# Patient Record
Sex: FEMALE | Race: WHITE | NOT HISPANIC OR LATINO | Employment: FULL TIME | URBAN - METROPOLITAN AREA
[De-identification: names, ages, dates, MRNs, and addresses within clinical notes are randomized per-mention and may not be internally consistent; named-entity substitution may affect disease eponyms.]

---

## 2018-03-09 ENCOUNTER — OFFICE VISIT (OUTPATIENT)
Dept: INTERNAL MEDICINE | Facility: CLINIC | Age: 23
End: 2018-03-09
Payer: COMMERCIAL

## 2018-03-09 VITALS
WEIGHT: 212.06 LBS | HEART RATE: 67 BPM | RESPIRATION RATE: 18 BRPM | SYSTOLIC BLOOD PRESSURE: 116 MMHG | DIASTOLIC BLOOD PRESSURE: 92 MMHG | BODY MASS INDEX: 33.28 KG/M2 | HEIGHT: 67 IN

## 2018-03-09 DIAGNOSIS — Z76.89 ENCOUNTER TO ESTABLISH CARE WITH NEW DOCTOR: Primary | ICD-10-CM

## 2018-03-09 DIAGNOSIS — B36.0 TINEA VERSICOLOR: ICD-10-CM

## 2018-03-09 DIAGNOSIS — Z86.79 H/O SINUS TACHYCARDIA: ICD-10-CM

## 2018-03-09 PROCEDURE — 99203 OFFICE O/P NEW LOW 30 MIN: CPT | Mod: S$GLB,,, | Performed by: INTERNAL MEDICINE

## 2018-03-09 PROCEDURE — 99999 PR PBB SHADOW E&M-NEW PATIENT-LVL III: CPT | Mod: PBBFAC,,, | Performed by: INTERNAL MEDICINE

## 2018-03-09 RX ORDER — KETOCONAZOLE 20 MG/G
CREAM TOPICAL 2 TIMES DAILY
Qty: 60 G | Refills: 1 | Status: SHIPPED | OUTPATIENT
Start: 2018-03-09 | End: 2018-06-25

## 2018-03-09 RX ORDER — PROPRANOLOL HYDROCHLORIDE 80 MG/1
80 CAPSULE, EXTENDED RELEASE ORAL DAILY
Refills: 10 | COMMUNITY
Start: 2018-02-19 | End: 2019-02-07 | Stop reason: SDUPTHER

## 2018-03-09 NOTE — PROGRESS NOTES
"Subjective:       Patient ID: Elizabeth Piña is a 22 y.o. female.    Chief Complaint: Rash (to neck)      HPI:  Patient is new to clinic and presents to establish care. Rash located on left neck. First noticed 3 months ago. Sometimes pruritic. Tried hydrocortisone without relief. Rash is not spreading. No new soaps, detergents. Uses Dial and Tone body wash and uses Tide detergent. Has never had any rash like this before. No contacts with similar rsh.     No prior h/o HTN, DM, HLD. She has intermittent tachycardia for which she takes propranolol. She has "heart murmur". Sees Dr. Mensah.     Tobacco use; never smoker  EtOH: weekend use  Illicit drugs: denies    Past Medical History:   Diagnosis Date    Migraines     Murmur     Tachycardia        Family History   Problem Relation Age of Onset    Diabetes Mother     Hypertension Father     Hyperlipidemia Father     Cerebral palsy Brother      half brother; dad's side    Heart disease Maternal Grandmother     Heart disease Maternal Grandfather     Heart disease Paternal Grandmother     Heart disease Paternal Grandfather        Social History     Social History    Marital status: Legally      Spouse name: N/A    Number of children: N/A    Years of education: N/A     Occupational History    Not on file.     Social History Main Topics    Smoking status: Never Smoker    Smokeless tobacco: Never Used    Alcohol use Yes      Comment: occ    Drug use: No    Sexual activity: Yes     Partners: Male     Birth control/ protection: IUD     Other Topics Concern    Not on file     Social History Narrative    No narrative on file       Review of Systems   Constitutional: Negative for activity change, fatigue, fever and unexpected weight change.   HENT: Negative for congestion, ear pain, hearing loss, rhinorrhea, sore throat and tinnitus.    Eyes: Negative for pain, redness and visual disturbance.   Respiratory: Negative for cough, shortness of breath and " wheezing.    Cardiovascular: Negative for chest pain, palpitations and leg swelling.   Gastrointestinal: Negative for abdominal pain, blood in stool, constipation, diarrhea, nausea and vomiting.   Genitourinary: Negative for dysuria, frequency, pelvic pain and urgency.   Musculoskeletal: Negative for back pain, joint swelling and neck pain.   Skin: Positive for rash. Negative for color change and wound.   Neurological: Negative for dizziness, tremors, weakness, light-headedness and headaches.         Objective:      Physical Exam   Constitutional: She is oriented to person, place, and time. She appears well-developed and well-nourished. No distress.   HENT:   Head: Normocephalic and atraumatic.   Right Ear: External ear normal.   Left Ear: External ear normal.   Eyes: EOM are normal. Right eye exhibits no discharge. Left eye exhibits no discharge. No scleral icterus.   Neck: Neck supple. No thyromegaly present.   Cardiovascular: Normal rate and regular rhythm.  Exam reveals no gallop and no friction rub.    No murmur heard.  Pulmonary/Chest: Effort normal and breath sounds normal. No respiratory distress. She has no wheezes. She has no rales.   Abdominal: Soft. Bowel sounds are normal. She exhibits no distension. There is no tenderness.   Lymphadenopathy:     She has no cervical adenopathy.   Neurological: She is alert and oriented to person, place, and time. No cranial nerve deficit.   Skin: Skin is warm and dry. Rash (reddish brown scaling macules on left neck, left acilla and under left breast) noted.   Psychiatric: She has a normal mood and affect. Her behavior is normal.   Vitals reviewed.      Assessment:       1. Encounter to establish care with new doctor    2. Tinea versicolor    3. H/O sinus tachycardia        Plan:       Elizabeth  was seen today for rash.    Diagnoses and all orders for this visit:    Encounter to establish care with new doctor  meds reconciled  Problem list reviewed and updated    Tinea  versicolor  -     ketoconazole (NIZORAL) 2 % cream; Apply topically 2 (two) times daily.  If not better let me know, will send to dermatology    H/O sinus tachycardia  Cont propranolol  Sees Dr. Mensah  HR normal today and regular  asymptomatic        Health Maintenance:  -CRC: not due  -PAP/MMG: seeing Dr. Aguillon April  -Bone Density: not due yet  -tobacco: never smoker  -Vaccines  Flu- completed    RTC RTC PRN

## 2018-03-09 NOTE — PATIENT INSTRUCTIONS
Tinea Versicolor  This is a rash caused by a fungus in the top layers of the skin. This fungus is normally present in the pores of the skin and causes no symptoms. But when the fungus overgrows it causes a rash. The fungus grows more easily in hot climates, and on oily or sweaty skin. Health experts dont know why some people get this rash and others dont. Experts also dont know why the rash will suddenly appear in someone who has never had it before.  The rash is made up of irregular pale or tan spots and patches. The rash is usually on the neck, upper back, chest, and shoulders. You may have mild itching, especially if you become overheated. But it doesn't cause other symptoms. Because these spots don't change color with sun exposure like normal skin, the rash may be lighter or darker than your normal skin.  This rash is harmless and usually causes no symptoms. The only reason for treatment is to improve appearance. Follow the advice below to clear the rash. It might take several months for normal skin color to return.  Home care  · Use a medicated dandruff shampoo over your whole body while in the shower. Dont use soap. Let the shampoo stay on for at least a few minutes before rinsing off. Do this every day for 4 weeks.  · As a different treatment, you may buy an antifungal cream (miconazole or clotrimazole, both available without a prescription). Use this 2 times a day for 7 days.   · This rash is not contagious to others. It cant be spread if someone touches it. So you dont have to worry about exposing others at school, , or work.  Prevention  This fungus can come back again (recur) after treatment. To prevent return of the rash, use medicated dandruff shampoo over your whole body when in the shower. Do this once a month for the next year. This is very important to do in the summertime. That is when the rash is most likely to recur.  Other prevention tips include:  · Avoid oily skin  products  · Wear loose clothing. Try to let your skin stay cool and breathe.  · Use sunscreen and protect yourself from sunlight  · Avoid tanning beds  Follow-up care  Follow up with your healthcare provider, or as advised. Call your provider if the rash doesnt get better with the above treatment, or if new symptoms appear.  When to seek medical advice  Call your healthcare provider right away if any of these occur:  · Increasing redness of the rash  · Change in appearance of the rash  · Fever of 100.4ºF (38ºC) or higher, or as directed by your provider  Date Last Reviewed: 8/1/2016  © 8196-2958 [employer]. [address] [place] [address], [place] [address]. All rights reserved. This information is not intended as a substitute for professional medical care. Always follow your healthcare professional's instructions.

## 2018-03-20 ENCOUNTER — OFFICE VISIT (OUTPATIENT)
Dept: INTERNAL MEDICINE | Facility: CLINIC | Age: 23
End: 2018-03-20
Payer: COMMERCIAL

## 2018-03-20 VITALS
BODY MASS INDEX: 34.12 KG/M2 | SYSTOLIC BLOOD PRESSURE: 104 MMHG | OXYGEN SATURATION: 99 % | WEIGHT: 217.38 LBS | TEMPERATURE: 98 F | HEIGHT: 67 IN | DIASTOLIC BLOOD PRESSURE: 62 MMHG | HEART RATE: 72 BPM | RESPIRATION RATE: 18 BRPM

## 2018-03-20 DIAGNOSIS — J06.9 VIRAL URI WITH COUGH: Primary | ICD-10-CM

## 2018-03-20 PROCEDURE — 99213 OFFICE O/P EST LOW 20 MIN: CPT | Mod: 25,S$GLB,, | Performed by: INTERNAL MEDICINE

## 2018-03-20 PROCEDURE — 99999 PR PBB SHADOW E&M-EST. PATIENT-LVL IV: CPT | Mod: PBBFAC,,, | Performed by: INTERNAL MEDICINE

## 2018-03-20 PROCEDURE — 96372 THER/PROPH/DIAG INJ SC/IM: CPT | Mod: S$GLB,,, | Performed by: INTERNAL MEDICINE

## 2018-03-20 RX ORDER — METHYLPREDNISOLONE ACETATE 80 MG/ML
80 INJECTION, SUSPENSION INTRA-ARTICULAR; INTRALESIONAL; INTRAMUSCULAR; SOFT TISSUE
Status: COMPLETED | OUTPATIENT
Start: 2018-03-20 | End: 2018-03-20

## 2018-03-20 RX ORDER — PROMETHAZINE HYDROCHLORIDE AND CODEINE PHOSPHATE 6.25; 1 MG/5ML; MG/5ML
5 SOLUTION ORAL EVERY 6 HOURS PRN
Qty: 120 ML | Refills: 0 | Status: SHIPPED | OUTPATIENT
Start: 2018-03-20 | End: 2018-03-30

## 2018-03-20 RX ADMIN — METHYLPREDNISOLONE ACETATE 80 MG: 80 INJECTION, SUSPENSION INTRA-ARTICULAR; INTRALESIONAL; INTRAMUSCULAR; SOFT TISSUE at 10:03

## 2018-03-20 NOTE — PATIENT INSTRUCTIONS
Viral Upper Respiratory Illness (Adult)  You have a viral upper respiratory illness (URI), which is another term for the common cold. This illness is contagious during the first few days. It is spread through the air by coughing and sneezing. It may also be spread by direct contact (touching the sick person and then touching your own eyes, nose, or mouth). Frequent handwashing will decrease risk of spread. Most viral illnesses go away within 7 to 10 days with rest and simple home remedies. Sometimes the illness may last for several weeks. Antibiotics will not kill a virus, and they are generally not prescribed for this condition.    Home care  · If symptoms are severe, rest at home for the first 2 to 3 days. When you resume activity, don't let yourself get too tired.  · Avoid being exposed to cigarette smoke (yours or others).  · You may use acetaminophen or ibuprofen to control pain and fever, unless another medicine was prescribed. (Note: If you have chronic liver or kidney disease, have ever had a stomach ulcer or gastrointestinal bleeding, or are taking blood-thinning medicines, talk with your healthcare provider before using these medicines.) Aspirin should never be given to anyone under 18 years of age who is ill with a viral infection or fever. It may cause severe liver or brain damage.  · Your appetite may be poor, so a light diet is fine. Avoid dehydration by drinking 6 to 8 glasses of fluids per day (water, soft drinks, juices, tea, or soup). Extra fluids will help loosen secretions in the nose and lungs.  · Over-the-counter cold medicines will not shorten the length of time youre sick, but they may be helpful for the following symptoms: cough, sore throat, and nasal and sinus congestion. (Note: Do not use decongestants if you have high blood pressure.)  Follow-up care  Follow up with your healthcare provider, or as advised.  When to seek medical advice  Call your healthcare provider right away if any  of these occur:  · Cough with lots of colored sputum (mucus)  · Severe headache; face, neck, or ear pain  · Difficulty swallowing due to throat pain  · Fever of 100.4°F (38°C)  Call 911, or get immediate medical care  Call emergency services right away if any of these occur:  · Chest pain, shortness of breath, wheezing, or difficulty breathing  · Coughing up blood  · Inability to swallow due to throat pain  Date Last Reviewed: 9/13/2015  © 6226-7998 [employer]. [address] [place] [address]. All rights reserved. This information is not intended as a substitute for professional medical care. Always follow your healthcare professional's instructions.

## 2018-03-20 NOTE — PROGRESS NOTES
Subjective:       Patient ID: Elizabeth Piña is a 22 y.o. female.    Chief Complaint: URI; Fever; and Otalgia      HPI:  Patient is known seb e and presents with sore throat, cough and congestion. Sx started 5-6 days ago. Getting worse. Cough is productive. No SOB or wheezing. She noted temp of 99 F but nothing higher. + b/l otalgia. Sore throat is improving. + PND. + sneezing. Tried tylenol cold OTC without relief.    Past Medical History:   Diagnosis Date    Elevated cholesterol     Heart disorder     hole in heart    Migraine headache     Migraines     Murmur     Tachycardia        Family History   Problem Relation Age of Onset    Diabetes Mother     Hypertension Father     Hyperlipidemia Father     Cerebral palsy Brother      half brother; dad's side    Heart disease Maternal Grandmother     Heart disease Maternal Grandfather     Heart disease Paternal Grandmother     Heart disease Paternal Grandfather     Hyperlipidemia Mother     Diabetes Maternal Grandmother     Kidney disease Maternal Grandfather        Social History     Social History    Marital status: Legally      Spouse name: N/A    Number of children: N/A    Years of education: N/A     Occupational History    Not on file.     Social History Main Topics    Smoking status: Never Smoker    Smokeless tobacco: Never Used    Alcohol use Yes      Comment: occ    Drug use: No    Sexual activity: Yes     Partners: Male     Birth control/ protection: IUD     Other Topics Concern    Not on file     Social History Narrative    ** Merged History Encounter **            Review of Systems   Constitutional: Positive for fatigue. Negative for activity change, fever and unexpected weight change.   HENT: Positive for congestion, postnasal drip, sneezing and sore throat. Negative for ear pain, hearing loss and rhinorrhea.    Eyes: Negative for redness and visual disturbance.   Respiratory: Positive for cough. Negative for shortness of  breath and wheezing.    Cardiovascular: Negative for chest pain and leg swelling.   Gastrointestinal: Negative for abdominal pain, constipation, diarrhea, nausea and vomiting.   Genitourinary: Negative for dysuria, frequency and urgency.   Musculoskeletal: Negative for back pain, joint swelling and neck pain.   Skin: Negative for color change, rash and wound.   Neurological: Negative for dizziness, tremors, weakness, light-headedness and headaches.         Objective:      Physical Exam   Constitutional: She is oriented to person, place, and time. She appears well-developed and well-nourished. No distress.   HENT:   Head: Normocephalic and atraumatic.   Right Ear: Tympanic membrane and external ear normal.   Left Ear: Tympanic membrane and external ear normal.   Mouth/Throat: Posterior oropharyngeal erythema (mild) present. No oropharyngeal exudate or posterior oropharyngeal edema.   Eyes: Conjunctivae and EOM are normal. Pupils are equal, round, and reactive to light. Right eye exhibits no discharge. Left eye exhibits no discharge.   Neck: Neck supple. No tracheal deviation present.   Cardiovascular: Normal rate and regular rhythm.    No murmur heard.  Pulmonary/Chest: Effort normal and breath sounds normal. No respiratory distress. She has no wheezes. She has no rales.   Abdominal: Soft. Bowel sounds are normal. She exhibits no distension. There is no tenderness.   Neurological: She is alert and oriented to person, place, and time. No cranial nerve deficit.   Skin: Skin is warm and dry.   Psychiatric: She has a normal mood and affect. Her behavior is normal.   Vitals reviewed.      Assessment:       1. Viral URI with cough        Plan:       Elizabeth was seen today for uri, fever and otalgia.    Diagnoses and all orders for this visit:    Viral URI with cough  -     methylPREDNISolone acetate injection 80 mg; Inject 1 mL (80 mg total) into the muscle one time.  -     promethazine-codeine 6.25-10 mg/5 ml (PHENERGAN  WITH CODEINE) 6.25-10 mg/5 mL syrup; Take 5 mLs by mouth every 6 (six) hours as needed.    claritin daily  Saline nasal spray PRN  Call for worsening sx or fever > 101 F    RTC PRN

## 2018-03-20 NOTE — LETTER
March 20, 2018      [facility]  [address] [place] [id number]  Phone: [phone]  Fax: [phone]       Patient: Elizabeth Piña   YOB: 1995  Date of Visit: 03/20/2018    To Whom It May Concern:    Lona Piña  was at [facility] on 03/20/2018. She may return to work/school on 3/21/18 with no restrictions. If you have any questions or concerns, or if I can be of further assistance, please do not hesitate to contact me.    Sincerely,    Bisi Gottlieb MD

## 2018-04-17 ENCOUNTER — PATIENT MESSAGE (OUTPATIENT)
Dept: ADMINISTRATIVE | Facility: OTHER | Age: 23
End: 2018-04-17

## 2018-04-20 ENCOUNTER — OFFICE VISIT (OUTPATIENT)
Dept: OBSTETRICS AND GYNECOLOGY | Facility: CLINIC | Age: 23
End: 2018-04-20
Payer: COMMERCIAL

## 2018-04-20 VITALS
HEART RATE: 50 BPM | DIASTOLIC BLOOD PRESSURE: 82 MMHG | BODY MASS INDEX: 33.12 KG/M2 | WEIGHT: 211 LBS | HEIGHT: 67 IN | SYSTOLIC BLOOD PRESSURE: 124 MMHG | RESPIRATION RATE: 16 BRPM

## 2018-04-20 DIAGNOSIS — Z30.09 UNWANTED FERTILITY: ICD-10-CM

## 2018-04-20 DIAGNOSIS — N94.6 DYSMENORRHEA: ICD-10-CM

## 2018-04-20 DIAGNOSIS — R10.2 PELVIC PAIN IN FEMALE: ICD-10-CM

## 2018-04-20 DIAGNOSIS — N92.1 MENORRHAGIA WITH IRREGULAR CYCLE: Primary | ICD-10-CM

## 2018-04-20 DIAGNOSIS — Z01.818 PREOPERATIVE TESTING: ICD-10-CM

## 2018-04-20 PROCEDURE — 99999 PR PBB SHADOW E&M-EST. PATIENT-LVL III: CPT | Mod: PBBFAC,,, | Performed by: OBSTETRICS & GYNECOLOGY

## 2018-04-20 PROCEDURE — 99204 OFFICE O/P NEW MOD 45 MIN: CPT | Mod: S$GLB,,, | Performed by: OBSTETRICS & GYNECOLOGY

## 2018-04-20 NOTE — Clinical Note
An IUD has been ordered per Dr. Aguillon through [facility] on 4/25/18.  She will have the insertion at time of surgery on 6/1/18.  Please f/u on this to make sure it is received in time.    Thanks,  Rita

## 2018-04-20 NOTE — PROGRESS NOTES
Subjective:   Patient ID: Elizabeth Piña is a 22 y.o. y.o. female.     Chief Complaint:   Chief Complaint   Patient presents with    Pelvic Pain    Pelvic Discomfort          History of Present Illness:    Elizabeth presents today complaining of menorrhagia and pelvic pain. Complaining of pain and bleeding.  Reports she has always had heavy menses except for when she had Mirena (~ 3 years ago).  Menses are at least 7 days.  Menses are irregular.  She has had a cycle every 2 weeks for ~ 2 months.  Bleeding heavy with increased passage of clots.  No other contraceptive has helped bleeding other than the Mirena IUD.  States she has pelvic and rectal pain.  Feels like someone is sticking a knife in her rectum.  Has pain in between menstrual cycles and pain with intercourse.  Has cramping in her abdomen as well.  Has pain with bowel movements.  Has pain almost daily and pain is significantly worsened with menses that is worse with the heaviest of bleeding. Has tried naproxen Rx and ibuprofen neither of which has helped significantly.  Pain radiates into her legs.  Had pelvic ultrasound that revealed probable adenomyosis and follicles on ovaries in 1/17 with her prior gynecologist (Dr. LUC Hyde).  Has mild hirsutism.  Patient's last menstrual period was 04/19/2018 (exact date).  States she does not desire future childbearing.     Past Medical History:   Diagnosis Date    Elevated cholesterol     Heart disorder     hole in heart    Migraine headache     Migraines     Murmur     Tachycardia      History reviewed. No pertinent surgical history.  Review of patient's allergies indicates:   Allergen Reactions    Doxycycline Other (See Comments)     Yeast infection     Current Outpatient Prescriptions on File Prior to Visit   Medication Sig Dispense Refill    ketoconazole (NIZORAL) 2 % cream Apply topically 2 (two) times daily. 60 g 1    propranolol (INDERAL LA) 80 MG 24 hr capsule Take 80 mg by mouth once daily.  10      No current facility-administered medications on file prior to visit.      Social History     Social History    Marital status: Legally      Spouse name: N/A    Number of children: N/A    Years of education: N/A     Occupational History    Not on file.     Social History Main Topics    Smoking status: Never Smoker    Smokeless tobacco: Never Used    Alcohol use Yes      Comment: occ    Drug use: No    Sexual activity: Yes     Partners: Male     Birth control/ protection: IUD      Comment: paraguard     Other Topics Concern    Not on file     Social History Narrative    ** Merged History Encounter **          Family History   Problem Relation Age of Onset    Diabetes Mother     Hyperlipidemia Mother     Hypertension Father     Hyperlipidemia Father     Cerebral palsy Brother      half brother; dad's side    Heart disease Maternal Grandmother     Diabetes Maternal Grandmother     Heart disease Maternal Grandfather     Kidney disease Maternal Grandfather     Heart disease Paternal Grandmother     Heart disease Paternal Grandfather     Breast cancer Neg Hx     Colon cancer Neg Hx     Ovarian cancer Neg Hx      The following portions of the patient's history were reviewed and updated as appropriate: allergies, current medications, past family history, past medical history, past social history, past surgical history and problem list.    Review of Systems   Constitutional: Negative for activity change.   Genitourinary: Positive for menorrhagia, menstrual problem and pelvic pain.   All other systems reviewed and are negative.        Objective:   Vital Signs:  Vitals:    04/20/18 1606   BP: 124/82   Pulse: (!) 50   Resp: 16       Physical Exam:  General:  alert; oriented; well-nourished female   Cardiovascular:  Pulmonary:  Abdomen: Regular rate and rhythm  Clear to auscultation bilaterallt   soft, mildly tender in bilateral lower abdomen (L>R); no rebound or guarding.   Bowel sounds  normal. No masses,  no organomegaly   Pelvis: External genitalia: normal general appearance  Urinary system: urethral meatus normal, bladder nontender  Vaginal: normal mucosa without prolapse or lesions; minimal dark blood in vaginal vault  Cervix: normal appearance; nontender  Uterus: retroverted; mildly tender  Adnexa: mildly tender in left adnexa; no palpable masses       Assessment:      1. Menorrhagia with irregular cycle    2. Dysmenorrhea    3. Pelvic pain in female    4. Unwanted fertility          Plan:      Menorrhagia with irregular cycle  -     Case Request Operating Room: LAPAROSCOPY-DIAGNOSTIC, DILATION-CURETTAGE OF UTERUS (D AND C), ABLATION ENDOMETRIAL THERMAL - NOVASURE, PLACEMENT INTRAUTERINE DEVICE (IUD)    Dysmenorrhea  -     Case Request Operating Room: LAPAROSCOPY-DIAGNOSTIC, DILATION-CURETTAGE OF UTERUS (D AND C), ABLATION ENDOMETRIAL THERMAL - NOVASURE, PLACEMENT INTRAUTERINE DEVICE (IUD)    Pelvic pain in female  -     Case Request Operating Room: LAPAROSCOPY-DIAGNOSTIC, DILATION-CURETTAGE OF UTERUS (D AND C), ABLATION ENDOMETRIAL THERMAL - NOVASURE, PLACEMENT INTRAUTERINE DEVICE (IUD)    Unwanted fertility  -     Case Request Operating Room: LAPAROSCOPY-DIAGNOSTIC, DILATION-CURETTAGE OF UTERUS (D AND C), ABLATION ENDOMETRIAL THERMAL - NOVASURE, PLACEMENT INTRAUTERINE DEVICE (IUD)    Counseled on further evaluation and management options.   Pt desires to proceed with diagnostic laparoscopy/D&C/Novasure ablation/Mirena IUD placement.  Counseled on risks, benefits, alternatives, and potential side effects.   Pt verbalized understanding.  Consents signed.  Will proceed with diagnostic laparoscopy/D&C/Novasure ablation/Mirena IUD placement pending acceptable preoperative testing.

## 2018-04-20 NOTE — Clinical Note
Per Eulalia in pharmacy, order IUD through specialty pharmacy for surgery.   Order pended.  Kindly,  Rita

## 2018-04-23 ENCOUNTER — PATIENT MESSAGE (OUTPATIENT)
Dept: NEUROLOGY | Facility: CLINIC | Age: 23
End: 2018-04-23

## 2018-04-23 ENCOUNTER — TELEPHONE (OUTPATIENT)
Dept: OBSTETRICS AND GYNECOLOGY | Facility: CLINIC | Age: 23
End: 2018-04-23

## 2018-04-23 NOTE — TELEPHONE ENCOUNTER
Surgery scheduled for 6/1/18 per pt's request  per case t number [id number].  Pt notified and verbalized understanding.

## 2018-04-23 NOTE — TELEPHONE ENCOUNTER
----- Message from Olivia Aguillon MD sent at 4/20/2018  4:50 PM CDT -----  Diagnostic laparoscopy/D&C/Novasure ablation/Mirena IUD placement

## 2018-05-04 ENCOUNTER — TELEPHONE (OUTPATIENT)
Dept: PHARMACY | Facility: CLINIC | Age: 23
End: 2018-05-04

## 2018-05-04 NOTE — TELEPHONE ENCOUNTER
Patient declined Mirena IUD consultation on 18.   Patient stated she had been on it previously.  Mirena shipment will be coordinated with [facility]- scheduled to deliver on 18 to [address], [place] [address]. $0 copay - patient informed. Confirmed 2 patient identifiers - name and . Patient verbalized understanding. Medlist and allergies reviewed.  All questions answered to patient's satisfaction.  Patient advised to call [facility] should any questions arise.

## 2018-05-04 NOTE — TELEPHONE ENCOUNTER
Mirena initial consultation and consent to coordinate shipment with [facility] attempted.  No answer.  M for call back.  $0 copay.

## 2018-05-07 ENCOUNTER — OFFICE VISIT (OUTPATIENT)
Dept: NEUROLOGY | Facility: CLINIC | Age: 23
End: 2018-05-07
Payer: COMMERCIAL

## 2018-05-07 VITALS
WEIGHT: 209.44 LBS | BODY MASS INDEX: 32.87 KG/M2 | HEART RATE: 62 BPM | DIASTOLIC BLOOD PRESSURE: 78 MMHG | RESPIRATION RATE: 16 BRPM | HEIGHT: 67 IN | SYSTOLIC BLOOD PRESSURE: 106 MMHG

## 2018-05-07 DIAGNOSIS — G43.019 INTRACTABLE MIGRAINE WITHOUT AURA AND WITHOUT STATUS MIGRAINOSUS: Primary | ICD-10-CM

## 2018-05-07 PROCEDURE — 3008F BODY MASS INDEX DOCD: CPT | Mod: CPTII,S$GLB,, | Performed by: PSYCHIATRY & NEUROLOGY

## 2018-05-07 PROCEDURE — 99203 OFFICE O/P NEW LOW 30 MIN: CPT | Mod: S$GLB,,, | Performed by: PSYCHIATRY & NEUROLOGY

## 2018-05-07 PROCEDURE — 99999 PR PBB SHADOW E&M-EST. PATIENT-LVL III: CPT | Mod: PBBFAC,,, | Performed by: PSYCHIATRY & NEUROLOGY

## 2018-05-07 RX ORDER — TOPIRAMATE 50 MG/1
50 TABLET, FILM COATED ORAL 2 TIMES DAILY
Qty: 60 TABLET | Refills: 11 | Status: SHIPPED | OUTPATIENT
Start: 2018-05-07 | End: 2019-02-07 | Stop reason: SDUPTHER

## 2018-05-07 RX ORDER — CETIRIZINE HYDROCHLORIDE 10 MG/1
10 TABLET ORAL DAILY
COMMUNITY
End: 2018-10-15

## 2018-05-07 RX ORDER — RIZATRIPTAN BENZOATE 5 MG/1
TABLET ORAL
Qty: 9 TABLET | Refills: 11 | Status: SHIPPED | OUTPATIENT
Start: 2018-05-07 | End: 2018-06-25

## 2018-05-07 NOTE — PROGRESS NOTES
"HPI:  : Elizabeth Piña is a 22 y.o. female with episode in early 2012 which sound like Vasovagal syncope. Near consulvive syncope at that time with "grunting and eyes rolling back" also noted, likely due to maintained upright position. Work up revealed only Small PFO noted on bubble study but cardiology agrees this is likely vasovagal in nature  No AED needed. History of migraine with aura.     It has been three years since the last visit with me.   Here again to talk about her headaches, which had improved but are recurring again more for frequently for the past 6 months. She is currently having 3 spells per week of headache which occurs in the frontal region. This is severe and disabling and lasts for hours with light and sound sensitivity. No recent aura or med chagnes.NO change in headache with IUD  She is pending surgery June 1 for endometriosis and she has 2 kids- will be pending  Ablation.   She was using exedrin without much relief. Now not using anything PRN. Maxalt did help but she is out.  Elavil made her too sleepy.  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   HENT: Negative for nosebleeds.    Eyes: Negative for double vision.   Respiratory: Negative for shortness of breath.    Cardiovascular: Negative for leg swelling.   Gastrointestinal: Negative for blood in stool.   Genitourinary: Negative for hematuria.   Musculoskeletal: Negative for neck pain.   Skin: Negative for rash.   Neurological: Positive for headaches. Negative for sensory change.   Psychiatric/Behavioral: The patient does not have insomnia.        Exam:   Gen Appearance, well developed/nourished in no apparent distress  CV: 2+ distal pulses with no edema or swelling  Neuro:  MS: Awake, alert, Sustains attention. Recent/remote memory intact, Language is full to spontaneous speech/comprehension. Fund of " "Knowledge is full  CN: Optic discs are flat with normal vasculature, PERRL, Extraoccular movements and visual fields are full. Normal facial sensation and strength, Tongue and Palate are midline and strong. Shoulder Shrug symmetric and strong.  Motor: Normal bulk, tone, no abnormal movements. No protonator drift and 5/5 bilateral UE/LE strength  Sensory: . Romberg negative and temp and vibration intact  Cerebellar: Finger-nose, Heal and shin intact  Gait: Normal stance, no ataxia      Imaging:3/28/12 MRI of the brain reports and images reviewed: NORMAL MRI OF THE BRAIN.  2/6/12 EEG report reviewed from [facility]: Normal    Assessment/Recommendation: Elizabeth Piña is a 22 y.o. female with episode in early 2012 which sound like Vasovagal syncope. Near consulvive syncope at that time with "grunting and eyes rolling back" also noted, likely due to maintained upright position. Work up revealed only Small PFO noted on bubble study but cardiology agrees this is likely vasovagal in nature  No AED needed. History of migraine with aura.  I recommend:     1. For migraine PRN:  Restart Maxalt PRN-- helps. Imitrex not effective  2. Propranolol is being used due to palpitations per Cardiologist/ no syncope/ PFO likely incidentally but is not fully helping her headaches right now   3.  Elavil caused too much sedation  4.  Try Topamax for headache prevention per orders Unless sedation, mood changes or other side effects  D/c all  meds above if pregnant. She is using IUD and pending ablation  Teratogenicity risk of Topamax reviewed  5. If the above fails, next step will be Botox. More imaging can be avoided as long as she improves as expected.     RTC 6months          "

## 2018-05-07 NOTE — PATIENT INSTRUCTIONS
Start Topamax as follows:    Take 1/2 tablet (25mg) nightly for 3 weeks.  Then 1/2 tablet twice daily for 3 weeks  Then 12/ tablet in the morning and 1 full tablet(50mg) at night  for 3 weeks.   Then take 1 tablet twice daily

## 2018-05-28 ENCOUNTER — ANESTHESIA EVENT (OUTPATIENT)
Dept: SURGERY | Facility: HOSPITAL | Age: 23
End: 2018-05-28
Payer: COMMERCIAL

## 2018-05-28 ENCOUNTER — HOSPITAL ENCOUNTER (OUTPATIENT)
Dept: PREADMISSION TESTING | Facility: HOSPITAL | Age: 23
Discharge: HOME | End: 2018-05-28
Attending: OBSTETRICS & GYNECOLOGY
Payer: COMMERCIAL

## 2018-05-28 ENCOUNTER — HOSPITAL ENCOUNTER (OUTPATIENT)
Dept: PULMONOLOGY | Facility: HOSPITAL | Age: 23
Discharge: HOME | End: 2018-05-28
Attending: OBSTETRICS & GYNECOLOGY
Payer: COMMERCIAL

## 2018-05-28 VITALS — HEIGHT: 67 IN | BODY MASS INDEX: 32.87 KG/M2 | WEIGHT: 209.44 LBS

## 2018-05-28 DIAGNOSIS — Z01.818 PREOPERATIVE TESTING: ICD-10-CM

## 2018-05-28 PROCEDURE — 93010 ELECTROCARDIOGRAM REPORT: CPT | Mod: ,,, | Performed by: INTERNAL MEDICINE

## 2018-05-28 PROCEDURE — 93005 ELECTROCARDIOGRAM TRACING: CPT

## 2018-05-28 NOTE — DISCHARGE INSTRUCTIONS
[facility]  Pre Admit Instructions    Day and Date of Procedure: Friday 6-1-18   Arrival time: 530           - Before 7 a.m. Enter through Emergency Room    · You must have a responsible  to bring you home    Do NOT eat or drink anything   past midnight before your procedure day    Please    · Do not wear makeup, jewelry, nail polish or body piercings  · Bring containers/solution for contacts, dentures, bridges - these and hearing aids will be removed before your procedure  · Do not bring cash, jewelry or valuables the day of your procedure   · No smoking at least 24 hours before your procedure  · Wear clothing that is comfortable and easy to take off and put on  · Do NOT shave for at least 5 days before your surgery    Review skin preparation handout before using. Shower with Hibiclens the Night before the procedure. Bring remaining Hibiclens with you the morning of surgery.                Information about your stay (Please Review)    Before Surgery  1. Cafeteria Meals: 7am to 10am; 11am to 1:30 pm; Dinner/Supper must may be ordered between 11:00 am and 4 pm from the [facility] [employer] Menu. Food will be available to  between 5 pm and 6 pm. The kitchen phone extension is [phone].  2. Your doctor may order and review labs, x-rays, ECG or other tests as a pre-surgery workup and will call you if there is need for follow up.  3. No smoking inside or outside the hospital on hospital grounds.  4. Wear clothing that is easy to take off and put on.  The hospital will provide you with a gown.  5. You may bring robe, slippers, nightwear, and toiletries (toothbrush, toothpaste, makeup).  6. If your doctor orders a Fleets Enema or other prep, follow package and/or doctors orders.  7. Brush your teeth and rinse your mouth the morning of surgery, but dont swallow the water.  8. The nurse will ask questions and check your condition.  The doctor may kameron your surgical site.  9. Compression boots may  be put on your calves to reduce the risk of blood clots.  10. The doctor may order medicine to help you relax before surgery.  After Surgery  1. The nurse will check your temperature, breathing, blood pressure, heart rate, IV site, and surgery site.  2. A diet will be ordered-most start with ice chips and then advance slowly to other foods.  3. If you have IV fluids the IV pump will beep to let the nurse know that she needs to check it.  4. You may have a urinary catheter and staff may measure your oral intake and urine output.  5. Pain medication may be ordered by the doctor after surgery.  If you have a pain management device tell your caretakers not to press the button because of OVERDOSE RISK.  6. When the nurse or doctor tells you it is okay to get out of bed, ask for help until you are stable.  7. The nurse may ask you to turn, cough, and deep breathe to prevent lung problems.  You can use a pillow to hold your incision when you deep breathe or cough to reduce pain.  8. The nurse will give you discharge instructions--incision care, symptoms to report to your doctor, and your follow-up appointment when you are discharged.  You cannot drive yourself home.  Goal for Discharge from One Day Surgery  · Control pain with an oral medication  · Walk without feeling dizzy or weak  · Tolerate liquids well  · Urinate without difficulty    Things you can do to  Reduce the Risk of Infections or Complications  Wash Hands and use Waterless Hand Sanitizers  · Wash hands frequently with soap and warm water for at least 15 seconds.   · Use hand sanitizers (alcohol based) often at home and in public if hands are not visibly soiled  Take Antibiotic Exactly as Prescribed  · Do not stop antibiotics too soon; you risk developing infection resistant to antibiotics  · Take your antibiotic even if you are feeling better and even if they upset your stomach  · Call the doctor if you cant tolerate the antibiotic or you have an allergic  reaction  Stay Healthy  · Take medicines as prescribed by your doctor  · Keep your diabetes under control - diet and medication  · Get enough rest, exercise and eat a healthy diet  Keep the Wound Clean and Dry  · Wash hands before and after taking care of the incision (cut)  · Wash hands when you remove a dressing, before you touch/apply a new dressing  · Shower and clean incision with antibacterial soap and rinse well if the doctor approves  · Allow the cut to dry completely before putting on a clean dressing  · Do not touch the part of the bandage that will cover the incision  · Do not use ointments unless your doctor tells you to-can promote bacterial growth  · If ordered, put ointment directly on the dressing-do not touch the end of the tube  · Do not scrub, remove scabs, or leave a damp dressing on the incision  · Do not use peroxide or alcohol to clean the incision unless the doctor tells you to   · Do not let children, pets or anyone else contaminate the incision  Stop Smoking To Prevent Infection  · Stop smoking-Centers for Disease Control recommends 30 days before surgery  · Smokers get more infections after surgery-studies have shown 6 times the risk  · Smokers have more scarring and heal slower-open wounds get infected easier  Prevent Respiratory complications  · Stop smoking  · Turn, cough, and deep breathe even if you have some pain when you do so.  · Splint your incision with a pillow when you cough/deep breath, to help control pain.  · Do not lie in one position for long periods of time.   Prevent Blood Clots  · When you wake move your legs, flex your feet, rotate your ankles, wiggle your toes  · Get up when the doctor says its ok.  Dangle your feet from the side of the bed  · Report symptoms-leg pain, redness/swelling, warm to touch; fever; shortness of breath, chest pain, severe upper back pain.

## 2018-05-28 NOTE — ANESTHESIA PREPROCEDURE EVALUATION
05/28/2018  Elizabeth Piña is a 22 y.o., female.    Anesthesia Evaluation    I have reviewed the Patient Summary Reports.    I have reviewed the Nursing Notes.   I have reviewed the Medications.     Review of Systems  Anesthesia Hx:  No problems with previous Anesthesia   Denies Personal Hx of Anesthesia complications.   Social:  Non-Smoker, No Alcohol Use    Hematology/Oncology:  Hematology Normal   Oncology Normal     EENT/Dental:EENT/Dental Normal   Cardiovascular:  Cardiovascular Normal Exercise tolerance: good  Patient has PFO   Pulmonary:  Pulmonary Normal    Renal/:  Renal/ Normal     Hepatic/GI:  Hepatic/GI Normal    Musculoskeletal:  Musculoskeletal Normal    Neurological:   Headaches    Endocrine:  Endocrine Normal    Dermatological:  Skin Normal    Psych:  Psychiatric Normal           Physical Exam  General:  Well nourished    Airway/Jaw/Neck:  Airway Findings: Mouth Opening: Normal Tongue: Normal  General Airway Assessment: Adult  Mallampati: II  TM Distance: Normal, at least 6 cm      Dental:  Dental Findings: In tact             Anesthesia Plan  Type of Anesthesia, risks & benefits discussed:  Anesthesia Type:  general  Patient's Preference:   Intra-op Monitoring Plan: standard ASA monitors  Intra-op Monitoring Plan Comments:   Post Op Pain Control Plan: multimodal analgesia  Post Op Pain Control Plan Comments:   Induction:   IV  Beta Blocker:  Patient is on a Beta-Blocker and has received one dose within the past 24 hours (No further documentation required).       Informed Consent: Patient understands risks and agrees with Anesthesia plan.  Questions answered. Anesthesia consent signed with patient.  ASA Score: 2     Day of Surgery Review of History & Physical: I have interviewed and examined the patient. I have reviewed the patient's H&P dated: 6/1/18. There are no significant changes.  H&P  update referred to the surgeon.         Ready For Surgery From Anesthesia Perspective.

## 2018-05-31 ENCOUNTER — PATIENT MESSAGE (OUTPATIENT)
Dept: SURGERY | Facility: HOSPITAL | Age: 23
End: 2018-05-31

## 2018-05-31 RX ORDER — TERCONAZOLE 8 MG/G
1 CREAM VAGINAL DAILY
Qty: 20 G | Refills: 1 | Status: SHIPPED | OUTPATIENT
Start: 2018-05-31 | End: 2018-06-25

## 2018-05-31 NOTE — TELEPHONE ENCOUNTER
Patient scheduled for Laparoscopy with Dr. Aguillon 6/1/18 stating she has a yeast infection. Please advise.

## 2018-06-01 ENCOUNTER — SURGERY (OUTPATIENT)
Age: 23
End: 2018-06-01

## 2018-06-01 ENCOUNTER — ANESTHESIA (OUTPATIENT)
Dept: SURGERY | Facility: HOSPITAL | Age: 23
End: 2018-06-01
Payer: COMMERCIAL

## 2018-06-01 ENCOUNTER — HOSPITAL ENCOUNTER (OUTPATIENT)
Facility: HOSPITAL | Age: 23
Discharge: HOME | End: 2018-06-01
Attending: OBSTETRICS & GYNECOLOGY | Admitting: OBSTETRICS & GYNECOLOGY
Payer: COMMERCIAL

## 2018-06-01 VITALS
OXYGEN SATURATION: 95 % | WEIGHT: 206.63 LBS | RESPIRATION RATE: 16 BRPM | TEMPERATURE: 97 F | HEIGHT: 67 IN | BODY MASS INDEX: 32.43 KG/M2 | SYSTOLIC BLOOD PRESSURE: 127 MMHG | DIASTOLIC BLOOD PRESSURE: 83 MMHG | HEART RATE: 71 BPM

## 2018-06-01 DIAGNOSIS — Z30.09 UNWANTED FERTILITY: ICD-10-CM

## 2018-06-01 DIAGNOSIS — N92.1 MENORRHAGIA WITH IRREGULAR CYCLE: Primary | ICD-10-CM

## 2018-06-01 DIAGNOSIS — N80.9 ENDOMETRIOSIS: ICD-10-CM

## 2018-06-01 DIAGNOSIS — N94.6 DYSMENORRHEA: ICD-10-CM

## 2018-06-01 DIAGNOSIS — R10.2 PELVIC PAIN IN FEMALE: ICD-10-CM

## 2018-06-01 DIAGNOSIS — Z30.433 ENCOUNTER FOR REMOVAL AND REINSERTION OF IUD: ICD-10-CM

## 2018-06-01 LAB — POCT GLUCOSE: 92 MG/DL (ref 70–110)

## 2018-06-01 PROCEDURE — 37000008 HC ANESTHESIA 1ST 15 MINUTES: Performed by: OBSTETRICS & GYNECOLOGY

## 2018-06-01 PROCEDURE — 58662 LAPAROSCOPY EXCISE LESIONS: CPT | Mod: ,,, | Performed by: OBSTETRICS & GYNECOLOGY

## 2018-06-01 PROCEDURE — 37000009 HC ANESTHESIA EA ADD 15 MINS: Performed by: OBSTETRICS & GYNECOLOGY

## 2018-06-01 PROCEDURE — 25000003 PHARM REV CODE 250: Performed by: OBSTETRICS & GYNECOLOGY

## 2018-06-01 PROCEDURE — 94799 UNLISTED PULMONARY SVC/PX: CPT

## 2018-06-01 PROCEDURE — 99900035 HC TECH TIME PER 15 MIN (STAT)

## 2018-06-01 PROCEDURE — 58301 REMOVE INTRAUTERINE DEVICE: CPT | Mod: 51,,, | Performed by: OBSTETRICS & GYNECOLOGY

## 2018-06-01 PROCEDURE — 88305 TISSUE EXAM BY PATHOLOGIST: CPT | Mod: 26,,, | Performed by: PATHOLOGY

## 2018-06-01 PROCEDURE — 63600175 PHARM REV CODE 636 W HCPCS: Performed by: NURSE ANESTHETIST, CERTIFIED REGISTERED

## 2018-06-01 PROCEDURE — 88305 TISSUE EXAM BY PATHOLOGIST: CPT | Performed by: PATHOLOGY

## 2018-06-01 PROCEDURE — 27201423 OPTIME MED/SURG SUP & DEVICES STERILE SUPPLY: Performed by: OBSTETRICS & GYNECOLOGY

## 2018-06-01 PROCEDURE — 36000709 HC OR TIME LEV III EA ADD 15 MIN: Performed by: OBSTETRICS & GYNECOLOGY

## 2018-06-01 PROCEDURE — 25000003 PHARM REV CODE 250: Performed by: NURSE ANESTHETIST, CERTIFIED REGISTERED

## 2018-06-01 PROCEDURE — 88300 SURGICAL PATH GROSS: CPT | Mod: 26,,, | Performed by: PATHOLOGY

## 2018-06-01 PROCEDURE — 36000708 HC OR TIME LEV III 1ST 15 MIN: Performed by: OBSTETRICS & GYNECOLOGY

## 2018-06-01 PROCEDURE — 58120 DILATION AND CURETTAGE: CPT | Mod: 59,,, | Performed by: OBSTETRICS & GYNECOLOGY

## 2018-06-01 PROCEDURE — 71000033 HC RECOVERY, INTIAL HOUR: Performed by: OBSTETRICS & GYNECOLOGY

## 2018-06-01 PROCEDURE — 00840 ANES IPER PX LOWER ABD NOS: CPT | Mod: QZ,P2 | Performed by: NURSE ANESTHETIST, CERTIFIED REGISTERED

## 2018-06-01 PROCEDURE — 63600175 PHARM REV CODE 636 W HCPCS: Performed by: OBSTETRICS & GYNECOLOGY

## 2018-06-01 PROCEDURE — 58300 INSERT INTRAUTERINE DEVICE: CPT | Mod: ,,, | Performed by: OBSTETRICS & GYNECOLOGY

## 2018-06-01 RX ORDER — HYDROCODONE BITARTRATE AND ACETAMINOPHEN 5; 325 MG/1; MG/1
1 TABLET ORAL EVERY 6 HOURS PRN
Qty: 12 TABLET | Refills: 0 | Status: SHIPPED | OUTPATIENT
Start: 2018-06-01 | End: 2018-06-25

## 2018-06-01 RX ORDER — DIPHENHYDRAMINE HCL 25 MG
25 CAPSULE ORAL EVERY 4 HOURS PRN
Status: DISCONTINUED | OUTPATIENT
Start: 2018-06-01 | End: 2018-06-01 | Stop reason: HOSPADM

## 2018-06-01 RX ORDER — KETOROLAC TROMETHAMINE 30 MG/ML
INJECTION, SOLUTION INTRAMUSCULAR; INTRAVENOUS
Status: DISCONTINUED | OUTPATIENT
Start: 2018-06-01 | End: 2018-06-01

## 2018-06-01 RX ORDER — FENTANYL CITRATE 50 UG/ML
INJECTION, SOLUTION INTRAMUSCULAR; INTRAVENOUS
Status: DISCONTINUED | OUTPATIENT
Start: 2018-06-01 | End: 2018-06-01

## 2018-06-01 RX ORDER — EPHEDRINE SULFATE 50 MG/ML
INJECTION, SOLUTION INTRAVENOUS
Status: DISCONTINUED | OUTPATIENT
Start: 2018-06-01 | End: 2018-06-01

## 2018-06-01 RX ORDER — ONDANSETRON HYDROCHLORIDE 2 MG/ML
INJECTION, SOLUTION INTRAMUSCULAR; INTRAVENOUS
Status: DISCONTINUED | OUTPATIENT
Start: 2018-06-01 | End: 2018-06-01

## 2018-06-01 RX ORDER — AMOXICILLIN 250 MG
1 CAPSULE ORAL 2 TIMES DAILY
Status: DISCONTINUED | OUTPATIENT
Start: 2018-06-01 | End: 2018-06-01 | Stop reason: HOSPADM

## 2018-06-01 RX ORDER — MIDAZOLAM HYDROCHLORIDE 1 MG/ML
INJECTION INTRAMUSCULAR; INTRAVENOUS
Status: DISCONTINUED | OUTPATIENT
Start: 2018-06-01 | End: 2018-06-01

## 2018-06-01 RX ORDER — ACETAMINOPHEN 10 MG/ML
INJECTION, SOLUTION INTRAVENOUS
Status: DISCONTINUED | OUTPATIENT
Start: 2018-06-01 | End: 2018-06-01

## 2018-06-01 RX ORDER — ONDANSETRON 2 MG/ML
4 INJECTION INTRAMUSCULAR; INTRAVENOUS EVERY 6 HOURS PRN
Status: DISCONTINUED | OUTPATIENT
Start: 2018-06-01 | End: 2018-06-01 | Stop reason: HOSPADM

## 2018-06-01 RX ORDER — ROCURONIUM BROMIDE 10 MG/ML
INJECTION, SOLUTION INTRAVENOUS
Status: DISCONTINUED | OUTPATIENT
Start: 2018-06-01 | End: 2018-06-01

## 2018-06-01 RX ORDER — HYDROMORPHONE HYDROCHLORIDE 2 MG/ML
1 INJECTION, SOLUTION INTRAMUSCULAR; INTRAVENOUS; SUBCUTANEOUS EVERY 4 HOURS PRN
Status: DISCONTINUED | OUTPATIENT
Start: 2018-06-01 | End: 2018-06-01 | Stop reason: HOSPADM

## 2018-06-01 RX ORDER — KETOROLAC TROMETHAMINE 30 MG/ML
30 INJECTION, SOLUTION INTRAMUSCULAR; INTRAVENOUS ONCE
Status: COMPLETED | OUTPATIENT
Start: 2018-06-01 | End: 2018-06-01

## 2018-06-01 RX ORDER — HYDROCODONE BITARTRATE AND ACETAMINOPHEN 5; 325 MG/1; MG/1
1 TABLET ORAL EVERY 4 HOURS PRN
Status: DISCONTINUED | OUTPATIENT
Start: 2018-06-01 | End: 2018-06-01 | Stop reason: HOSPADM

## 2018-06-01 RX ORDER — PHENYLEPHRINE HYDROCHLORIDE 10 MG/ML
INJECTION INTRAVENOUS
Status: DISCONTINUED | OUTPATIENT
Start: 2018-06-01 | End: 2018-06-01

## 2018-06-01 RX ORDER — HYDROCODONE BITARTRATE AND ACETAMINOPHEN 10; 325 MG/1; MG/1
1 TABLET ORAL EVERY 4 HOURS PRN
Status: DISCONTINUED | OUTPATIENT
Start: 2018-06-01 | End: 2018-06-01 | Stop reason: HOSPADM

## 2018-06-01 RX ORDER — SODIUM CHLORIDE, SODIUM LACTATE, POTASSIUM CHLORIDE, CALCIUM CHLORIDE 600; 310; 30; 20 MG/100ML; MG/100ML; MG/100ML; MG/100ML
INJECTION, SOLUTION INTRAVENOUS CONTINUOUS PRN
Status: DISCONTINUED | OUTPATIENT
Start: 2018-06-01 | End: 2018-06-01

## 2018-06-01 RX ORDER — SODIUM CHLORIDE, SODIUM LACTATE, POTASSIUM CHLORIDE, CALCIUM CHLORIDE 600; 310; 30; 20 MG/100ML; MG/100ML; MG/100ML; MG/100ML
INJECTION, SOLUTION INTRAVENOUS CONTINUOUS
Status: DISCONTINUED | OUTPATIENT
Start: 2018-06-01 | End: 2018-06-01 | Stop reason: HOSPADM

## 2018-06-01 RX ORDER — PROPOFOL 10 MG/ML
VIAL (ML) INTRAVENOUS
Status: DISCONTINUED | OUTPATIENT
Start: 2018-06-01 | End: 2018-06-01

## 2018-06-01 RX ORDER — DEXAMETHASONE SODIUM PHOSPHATE 4 MG/ML
INJECTION, SOLUTION INTRA-ARTICULAR; INTRALESIONAL; INTRAMUSCULAR; INTRAVENOUS; SOFT TISSUE
Status: DISCONTINUED | OUTPATIENT
Start: 2018-06-01 | End: 2018-06-01

## 2018-06-01 RX ORDER — IBUPROFEN 800 MG/1
800 TABLET ORAL EVERY 8 HOURS PRN
Qty: 30 TABLET | Refills: 0 | Status: SHIPPED | OUTPATIENT
Start: 2018-06-01 | End: 2018-06-25

## 2018-06-01 RX ORDER — LIDOCAINE HYDROCHLORIDE 20 MG/ML
INJECTION, SOLUTION EPIDURAL; INFILTRATION; INTRACAUDAL; PERINEURAL
Status: DISCONTINUED | OUTPATIENT
Start: 2018-06-01 | End: 2018-06-01

## 2018-06-01 RX ORDER — NEOSTIGMINE METHYLSULFATE 5 MG/5 ML
SYRINGE (ML) INTRAVENOUS
Status: DISCONTINUED | OUTPATIENT
Start: 2018-06-01 | End: 2018-06-01

## 2018-06-01 RX ORDER — GLYCOPYRROLATE 0.2 MG/ML
INJECTION INTRAMUSCULAR; INTRAVENOUS
Status: DISCONTINUED | OUTPATIENT
Start: 2018-06-01 | End: 2018-06-01

## 2018-06-01 RX ADMIN — PHENYLEPHRINE HYDROCHLORIDE 100 MCG: 10 INJECTION INTRAVENOUS at 07:06

## 2018-06-01 RX ADMIN — FENTANYL CITRATE 25 MCG: 50 INJECTION, SOLUTION INTRAMUSCULAR; INTRAVENOUS at 09:06

## 2018-06-01 RX ADMIN — GLYCOPYRROLATE 0.2 MG: 0.2 INJECTION INTRAMUSCULAR; INTRAVENOUS at 08:06

## 2018-06-01 RX ADMIN — DOCUSATE SODIUM AND SENNOSIDES 1 TABLET: 8.6; 5 TABLET, FILM COATED ORAL at 10:06

## 2018-06-01 RX ADMIN — SODIUM CHLORIDE, SODIUM LACTATE, POTASSIUM CHLORIDE, AND CALCIUM CHLORIDE: .6; .31; .03; .02 INJECTION, SOLUTION INTRAVENOUS at 10:06

## 2018-06-01 RX ADMIN — DEXAMETHASONE SODIUM PHOSPHATE 8 MG: 4 INJECTION, SOLUTION INTRAMUSCULAR; INTRAVENOUS at 07:06

## 2018-06-01 RX ADMIN — HYDROMORPHONE HYDROCHLORIDE 1 MG: 2 INJECTION INTRAMUSCULAR; INTRAVENOUS; SUBCUTANEOUS at 12:06

## 2018-06-01 RX ADMIN — EPHEDRINE SULFATE 5 MG: 50 INJECTION INTRAMUSCULAR; INTRAVENOUS; SUBCUTANEOUS at 08:06

## 2018-06-01 RX ADMIN — ROCURONIUM BROMIDE 10 MG: 10 INJECTION, SOLUTION INTRAVENOUS at 08:06

## 2018-06-01 RX ADMIN — PROPOFOL 180 MG: 10 INJECTION, EMULSION INTRAVENOUS at 07:06

## 2018-06-01 RX ADMIN — FENTANYL CITRATE 50 MCG: 50 INJECTION, SOLUTION INTRAMUSCULAR; INTRAVENOUS at 07:06

## 2018-06-01 RX ADMIN — MIDAZOLAM HYDROCHLORIDE 2 MG: 1 INJECTION, SOLUTION INTRAMUSCULAR; INTRAVENOUS at 07:06

## 2018-06-01 RX ADMIN — SODIUM CHLORIDE, SODIUM LACTATE, POTASSIUM CHLORIDE, AND CALCIUM CHLORIDE: .6; .31; .03; .02 INJECTION, SOLUTION INTRAVENOUS at 07:06

## 2018-06-01 RX ADMIN — SODIUM CHLORIDE, SODIUM LACTATE, POTASSIUM CHLORIDE, AND CALCIUM CHLORIDE: .6; .31; .03; .02 INJECTION, SOLUTION INTRAVENOUS at 08:06

## 2018-06-01 RX ADMIN — ACETAMINOPHEN 1000 MG: 10 INJECTION, SOLUTION INTRAVENOUS at 07:06

## 2018-06-01 RX ADMIN — PROPOFOL 50 MG: 10 INJECTION, EMULSION INTRAVENOUS at 08:06

## 2018-06-01 RX ADMIN — EPHEDRINE SULFATE 10 MG: 50 INJECTION INTRAMUSCULAR; INTRAVENOUS; SUBCUTANEOUS at 08:06

## 2018-06-01 RX ADMIN — KETOROLAC TROMETHAMINE 30 MG: 30 INJECTION, SOLUTION INTRAMUSCULAR at 02:06

## 2018-06-01 RX ADMIN — ONDANSETRON 4 MG: 2 INJECTION, SOLUTION INTRAMUSCULAR; INTRAVENOUS at 07:06

## 2018-06-01 RX ADMIN — KETOROLAC TROMETHAMINE 30 MG: 30 INJECTION, SOLUTION INTRAMUSCULAR; INTRAVENOUS at 08:06

## 2018-06-01 RX ADMIN — FENTANYL CITRATE 50 MCG: 50 INJECTION, SOLUTION INTRAMUSCULAR; INTRAVENOUS at 08:06

## 2018-06-01 RX ADMIN — FENTANYL CITRATE 100 MCG: 50 INJECTION, SOLUTION INTRAMUSCULAR; INTRAVENOUS at 07:06

## 2018-06-01 RX ADMIN — HYDROCODONE BITARTRATE AND ACETAMINOPHEN 1 TABLET: 10; 325 TABLET ORAL at 10:06

## 2018-06-01 RX ADMIN — GLYCOPYRROLATE 0.4 MG: 0.2 INJECTION INTRAMUSCULAR; INTRAVENOUS at 08:06

## 2018-06-01 RX ADMIN — LIDOCAINE HYDROCHLORIDE 60 MG: 20 INJECTION, SOLUTION EPIDURAL; INFILTRATION; INTRACAUDAL; PERINEURAL at 07:06

## 2018-06-01 RX ADMIN — ROCURONIUM BROMIDE 30 MG: 10 INJECTION, SOLUTION INTRAVENOUS at 07:06

## 2018-06-01 RX ADMIN — Medication 4 MG: at 08:06

## 2018-06-01 NOTE — TRANSFER OF CARE
"Anesthesia Transfer of Care Note    Patient: Elizabeth Piña    Procedure(s) Performed: Procedure(s) (LRB):  LAPAROSCOPY-DIAGNOSTIC (N/A)  DILATION-CURETTAGE OF UTERUS (D AND C) (N/A)  ABLATION ENDOMETRIAL THERMAL - NOVASURE (N/A)  PLACEMENT INTRAUTERINE DEVICE (IUD) (N/A)    Patient location: PACU    Anesthesia Type: general    Transport from OR: Transported from OR on 6-10 L/min O2 by face mask with adequate spontaneous ventilation    Post pain: adequate analgesia    Post assessment: no apparent anesthetic complications and tolerated procedure well    Post vital signs: stable    Level of consciousness: sedated    Nausea/Vomiting: no nausea/vomiting    Complications: none    Transfer of care protocol was followed      Last vitals:   Visit Vitals  /87 (BP Location: Right arm, Patient Position: Sitting)   Pulse 72   Temp 37 °C (98.6 °F) (Oral)   Resp 18   Ht 5' 7" (1.702 m)   Wt 93.7 kg (206 lb 9.6 oz)   LMP 05/15/2018   SpO2 97%   Breastfeeding? No   BMI 32.36 kg/m²     "

## 2018-06-01 NOTE — BRIEF OP NOTE
Brief Operative Note     SUMMARY     Surgery Date: 6/1/2018     Surgeon(s) and Role:     * Olivia Aguillon MD - Primary    Pre-op Diagnosis:  Dysmenorrhea [N94.6]  Pelvic pain in female [R10.2]  Menorrhagia with irregular cycle [N92.1]  Unwanted fertility [Z30.09]  Encounter for IUD removal and placement    Post-op Diagnosis:  same    Procedure(s) (LRB):  REMOVAL OF INTRAUTERINE DEVICE  LAPAROSCOPY-DIAGNOSTIC (N/A)  FULGURATION OF ENDOMETRIOSIS  LEFT OVARIAN CYSTECTOMY  LYSIS OF ADHESIONS  DILATION-CURETTAGE OF UTERUS (D AND C) (N/A)  ABLATION ENDOMETRIAL THERMAL - NOVASURE (N/A)  PLACEMENT INTRAUTERINE DEVICE (IUD) (N/A)    Anesthesia: General    Findings/Key Components:  IUD present.  Uterus sounded to 8.5 cm.  Cervical length 3.5 cm.  Uterine cavity length 5 cm.  Adhesion of omentum to upper abdominal wall to the left of the umbilicus.   Normal appearing uterus.  Normal appearing tubes bilaterally.  Normal appearing right ovary.  ~ 4 cm simple appearing left ovarian cyst. Diffuse endometriosis present in posterior cul-de-sac.  Endometriosis present on rectum.  Endometriosis present along left ureter.  Endometriosis present along bilateral uterosacral ligaments and along posterior aspect of the uterus and left pelvic sidewall.   Endometriosis most pronounced on the left.    Moderate amount of endometrial curettings.  Uterine cavity width 3.7 cm.  Ablation power 102 duarte.  Ablation time 1 min 20 seconds.    Estimated Blood Loss: 10 mL           Specimens     Start     Ordered    06/01/18 0914  Specimen to Pathology - Surgery  Once      06/01/18 0920    06/01/18 0823  Specimen to Pathology - Surgery  Once      06/01/18 0839          Discharge Note      SUMMARY     Admit Date: 6/1/2018    Attending Physician: Olivia Aguillon MD    Discharge Physician: Olivia Aguillon MD    Discharge Date: 6/1/2018     Admit Diagnosis:  Dysmenorrhea [N94.6]  Pelvic pain in female [R10.2]  Menorrhagia with irregular cycle [N92.1]  Unwanted  fertility [Z30.09]    Final Diagnosis: Same    Procedure:REMOVAL OF INTRAUTERINE DEVICE  LAPAROSCOPY-DIAGNOSTIC (N/A)  FULGURATION OF ENDOMETRIOSIS  LEFT OVARIAN CYSTECTOMY  LYSIS OF ADHESIONS  DILATION-CURETTAGE OF UTERUS (D AND C) (N/A)  ABLATION ENDOMETRIAL THERMAL - NOVASURE (N/A)  PLACEMENT INTRAUTERINE DEVICE (IUD) (N/A)Disposition: Home or Self Care    Condition: Stable    Hospital Course: Pt underwent REMOVAL OF INTRAUTERINE DEVICE  LAPAROSCOPY-DIAGNOSTIC (N/A)  FULGURATION OF ENDOMETRIOSIS  LEFT OVARIAN CYSTECTOMY  LYSIS OF ADHESIONS  DILATION-CURETTAGE OF UTERUS (D AND C) (N/A)  ABLATION ENDOMETRIAL THERMAL - NOVASURE (N/A)  PLACEMENT INTRAUTERINE DEVICE (IUD) (N/A) wiithout complications.  Her postoperative course was uncomplicated and she met all discharge criteria on postoperative day #0.    Patient Instructions:   Current Discharge Medication List      START taking these medications    Details   HYDROcodone-acetaminophen (NORCO) 5-325 mg per tablet Take 1 tablet by mouth every 6 (six) hours as needed for Pain.  Qty: 12 tablet, Refills: 0      ibuprofen (ADVIL,MOTRIN) 800 MG tablet Take 1 tablet (800 mg total) by mouth every 8 (eight) hours as needed (cramping).  Qty: 30 tablet, Refills: 0         CONTINUE these medications which have NOT CHANGED    Details   cetirizine (ZYRTEC) 10 MG tablet Take 10 mg by mouth once daily.      ketoconazole (NIZORAL) 2 % cream Apply topically 2 (two) times daily.  Qty: 60 g, Refills: 1    Associated Diagnoses: Tinea versicolor      levonorgestrel (MIRENA) 20 mcg/24 hr (5 years) IUD 1 intra uterine device by intrauterine route once  Qty: 1 Intra Uterine Device, Refills: 0      propranolol (INDERAL LA) 80 MG 24 hr capsule Take 80 mg by mouth once daily.  Refills: 10      topiramate (TOPAMAX) 50 MG tablet Take 1 tablet (50 mg total) by mouth 2 (two) times daily.  Qty: 60 tablet, Refills: 11      rizatriptan (MAXALT) 5 MG tablet Take one daily as needed for headache. May  repeat dose once after 2 hours if needed  Qty: 9 tablet, Refills: 11      terconazole (TERAZOL 3) 0.8 % vaginal cream Place 1 applicator vaginally once daily.  Qty: 20 g, Refills: 1         STOP taking these medications       propranolol (INNOPRAN XL) 80 mg 24 hr capsule Comments:   Reason for Stopping:               Discharge Procedure Orders (must include Diet, Follow-up, Activity)    Discharge Procedure Orders (must include Diet, Follow-up, Activity)  Diet general     Other restrictions (specify):   Order Comments: No lifting >20lbs, pelvic rest, no driving while on narcotics     Call MD for:  temperature >100.4     Call MD for:  persistent nausea and vomiting     Call MD for:  severe uncontrolled pain     Call MD for:  difficulty breathing, headache or visual disturbances     Call MD for:  redness, tenderness, or signs of infection (pain, swelling, redness, odor or green/yellow discharge around incision site)     Call MD for:  hives     Call MD for:  persistent dizziness or light-headedness     Call MD for:  extreme fatigue     Call MD for:     Wound care routine (specify)   Order Comments: Wound care routine:   1. Keep clean and dry  2. Showers only  3. No creams/ointment  4. Remove soiled sterisrips          Activity:  Pelvic rest; otherwise, as tolerated    Diet:  Regular    Follow-up Information     Olivia Aguillon MD In 2 weeks.    Specialty:  Obstetrics  Why:  postop check  Contact information:  [address] [place] [id number]  [phone]

## 2018-06-01 NOTE — ANESTHESIA POSTPROCEDURE EVALUATION
"Anesthesia Post Evaluation    Patient: Elizabeth Piña    Procedure(s) Performed: Procedure(s) (LRB):  LAPAROSCOPY-DIAGNOSTIC (N/A)  DILATION-CURETTAGE OF UTERUS (D AND C) (N/A)  ABLATION ENDOMETRIAL THERMAL - NOVASURE (N/A)  PLACEMENT INTRAUTERINE DEVICE (IUD) (N/A)    Final Anesthesia Type: general  Patient location during evaluation: PACU  Patient participation: Yes- Able to Participate  Level of consciousness: awake and alert, oriented and awake  Post-procedure vital signs: reviewed and stable  Pain management: adequate  Airway patency: patent  PONV status at discharge: No PONV  Anesthetic complications: no      Cardiovascular status: blood pressure returned to baseline, hemodynamically stable and stable  Respiratory status: unassisted, spontaneous ventilation and room air  Hydration status: euvolemic  Follow-up not needed.        Visit Vitals  /77 (BP Location: Left arm, Patient Position: Lying)   Pulse 64   Temp 36.6 °C (97.9 °F) (Tympanic)   Resp 16   Ht 5' 7" (1.702 m)   Wt 93.7 kg (206 lb 9.6 oz)   LMP 05/15/2018   SpO2 95%   Breastfeeding? No   BMI 32.36 kg/m²       Pain/Anibal Score: Pain Assessment Performed: Yes (6/1/2018  9:50 AM)  Presence of Pain: complains of pain/discomfort (6/1/2018  9:50 AM)  Anibal Score: 10 (6/1/2018  9:40 AM)      "

## 2018-06-01 NOTE — OP NOTE
DATE OF PROCEDURE:  06/01/2018.    PROCEDURES:  1.  Removal of intrauterine device.  2.  Diagnostic laparoscopy.  3.  Fulguration of endometriosis.  4.  Lysis of adhesions.  5.  Left ovarian cystectomy.  6.  Dilation and curettage of the uterus.  7.  NovaSure endometrial ablation.  8.  Placement of the intrauterine device (Mirena IUD).    SURGEON:  Olivia Aguillon M.D.    PREOPERATIVE DIAGNOSES:  1.  Encounter for IUD removal and placement.  2.  Menorrhagia with irregular cycle.  3.  Pelvic pain in female.  4.  Dysmenorrhea.  5.  Unwanted fertility.    POSTOPERATIVE DIAGNOSES:  1.  Encounter for IUD removal and placement.  2.  Menorrhagia with irregular cycle.  3.  Pelvic pain in female.  4.  Dysmenorrhea.  5.  Unwanted fertility.  6.  Endometriosis.  7.  Left ovarian cyst.    PROCEDURES:  1.  Removal of intrauterine device.  2.  Diagnostic laparoscopy.  3.  Fulguration of endometriosis.  4.  Left ovarian cystectomy.  5.  Lysis of adhesions.  6.  Dilation and curettage of the uterus.  7.  NovaSure endometrial ablation.  8.  Placement of intrauterine device (Mirena).    ANESTHESIA:  General endotracheal anesthesia.    COMPLICATIONS:  None.    ESTIMATED BLOOD LOSS:  Approximately 10 mL.    INDICATIONS:  Ms. Johnson is a 22-year-old female with a long history of pelvic   pain and menorrhagia with irregular cycle.  She desires evaluation for pelvic   pain along with management of bleeding with NovaSure ablation.  She had   underwent a fertility and desires removal of current IUD and replacement of   another.    FINDINGS:  Revealed IUD was present in the intrauterine location.  The uterus   sounded to 8.5 cm.  Cervical length was 3.5 cm.  Uterine cavity length was 5 cm.    There was an adhesion of the omentum in the upper abdominal wall to the left   of the umbilicus.  The uterus appeared to be normal.  There were   normal-appearing tubes bilaterally.  The right ovary was normal in appearance.    There was an approximate 4 cm  simple-appearing left ovarian cyst present.  There   was diffuse endometriosis present in the posterior cul-de-sac.  There was   endometriosis present along the rectum.  The endometriosis was also along the   left ureter.  Endometriosis was present along the bilateral uterosacral   ligaments and along the posterior aspect of the uterus and the left pelvic   sidewall.  Endometriosis was most prominent in the left posterior cul-de-sac.  A   moderate amount of endometrial curettings obtained.  Uterine cavity width was   3.7 cm.  Ablation power was 102 duarte.  Ablation time was 1 minute 20 seconds.    PROCEDURE IN DETAIL:  The patient was taken to the Operating Room where general   endotracheal anesthesia was administered without complication.  She was placed   in the dorsal lithotomy position in adjustable Yellofin stirrups.  The patient's   abdomen and perineum were prepped in the normal sterile fashion.  A sterile   weighted speculum was placed inside the patient's vagina.  The anterior lip of   the cervix was grasped with an Allis clamp.  The intrauterine device that was   present was removed.  Next, the uterus was sounded to 8.5 cm.  Cervical length   was determined to be 3.5 cm.  Dilation was required to allow for placement of   the HUMI intrauterine manipulator.  All other instruments were then removed from   the patient's vagina.  A red rubber catheter was used to drain the patient's   bladder, resulting in approximately 200 mL of clear urine.  The patient was then   draped.    A 10 mm infraumbilical skin incision was made with a scalpel.  Veress needle was   inserted into this location and proper intraperitoneal placement was confirmed   with the saline drop test and low intra-abdominal pressure.  The abdomen was   then insufflated.  Next, two 5 mm trocars were inserted under direct   laparoscopic visualization.  Survey of the abdomen and pelvis revealed the   findings as noted above.  The laparoscopic spatula  was used to ablate the area   of endometriosis that were present in the posterior cul-de-sac and the areas   that were stated to be ablated/fulgurated.  The endometriosis that was on the   rectum and along the left ureter were not fulgurated due to those location.  The   remainder of the endometriosis was fulgurated using the spatula.  Next, the   patient's left ovary was grasped and incised with the laparoscopic spatula.  The   graspers were then used to remove the cyst wall, which was sent to Pathology.    Gyrus was used to achieve hemostasis in the ovary at the site of cystectomy.    The abdomen and pelvis were then copiously irrigated.  The abdomen was   desufflated and excellent hemostasis was noted.  Next, attention was turned to   the adhesion that was present.  This was able to be lysed.  Next, the patient   was taken out of Trendelenburg position.  The lower abdominal trocars were   removed.  Excellent hemostasis was noted.  The abdomen was then fully   desufflated.  The infraumbilical trocar was withdrawn.  Skin incisions were   closed with a #3-0 Vicryl suture in a subcuticular fashion and further dressed   with dermal skin adhesive.    The intrauterine manipulator was then removed.  Sterile weighted speculum was   placed back inside the patient's vagina.  The anterior lip of the cervix was   grasped with an Allis clamp.  The uterus was sounded once again to 8.5 cm.    Cervix had remained dilated.  Sharp curettage was performed until a gritty   texture was noted throughout and the endometrial curettings were sent to   Pathology.  The NovaSure ablation device was then inserted into the patient's   uterus.  Proper seating was performed according to 's instructions.    The uterine cavity width was determined to be 3.7 cm.  Cavity assessment was   performed and passed.  The ablation was then initiated at a power of 102 duarte.    Ablation time was 1 minute 20 seconds.  The ablation device was  withdrawn and   noted to be appropriately charred.  Next, the uterus was sounded once again to   8.5 cm.  Mirena intrauterine device was then placed into the patient's uterus   according to 's instructions.  All instruments were then removed   from the patient's vagina.  Excellent hemostasis had been noted.  Sponge, lap,   needle, and instrument counts were correct x2 and the patient was transferred to   the recovery area awake and in stable condition.      ELM/HN  dd: 06/01/2018 09:39:07 (CDT)  td: 06/01/2018 11:07:32 (CDT)  Doc ID   #[id number]  Job ID #[id number]    CC:

## 2018-06-01 NOTE — PLAN OF CARE
V/S stable. Tolerating diet. Ambulating without difficulty. Voiding spontaneously. Some cramping. Improved after medication. Incisions to abdomen dry and intact. Follow up education given written and verbally. No questions or concerns. Patient understands plan of care and agrees.

## 2018-06-01 NOTE — H&P
Subjective:   Patient ID: Elizabeth Piña is a 22 y.o. y.o. female.      Chief Complaint:       Chief Complaint   Patient presents with    Pelvic Pain    Pelvic Discomfort            History of Present Illness:    Elizabeth presents today for scheduled diagnostic laparoscopy/IUD removal/D&C/Novasure ablation/Mirena IUD placement due to menorrhagia, pelvic pain, and unwanted fertility. Complaining of pain and bleeding.  Reports she has always had heavy menses even with Mirena IUD.  However, Mirena has controlled cycles better than any other form of contraception.   Menses are at least 7 days.  Menses are irregular.  She has had a cycle every 2 weeks for ~ 2 months.  Bleeding heavy with increased passage of clots.   States she has pelvic and rectal pain.  Feels like someone is sticking a knife in her rectum.  Has pain in between menstrual cycles and pain with intercourse.  Has cramping in her abdomen as well.  Has pain with bowel movements.  Has pain almost daily and pain is significantly worsened with menses that is worse with the heaviest of bleeding. Has tried naproxen Rx and ibuprofen neither of which has helped significantly.  Pain radiates into her legs.  Had pelvic ultrasound that revealed probable adenomyosis and follicles on ovaries in 1/17 with her prior gynecologist (Dr. LUC Hyde).  Has mild hirsutism.  Patient's last menstrual period was 04/19/2018 (exact date).  States she does not desire future childbearing.           Past Medical History:   Diagnosis Date    Elevated cholesterol      Heart disorder       hole in heart    Migraine headache      Migraines      Murmur      Tachycardia        History reviewed. No pertinent surgical history.        Review of patient's allergies indicates:   Allergen Reactions    Doxycycline Other (See Comments)       Yeast infection             Current Outpatient Prescriptions on File Prior to Visit   Medication Sig Dispense Refill    ketoconazole (NIZORAL) 2 % cream  Apply topically 2 (two) times daily. 60 g 1    propranolol (INDERAL LA) 80 MG 24 hr capsule Take 80 mg by mouth once daily.   10      No current facility-administered medications on file prior to visit.       Social History            Social History    Marital status: Legally        Spouse name: N/A    Number of children: N/A    Years of education: N/A          Occupational History    Not on file.             Social History Main Topics    Smoking status: Never Smoker    Smokeless tobacco: Never Used    Alcohol use Yes         Comment: occ    Drug use: No    Sexual activity: Yes       Partners: Male       Birth control/ protection: IUD         Comment: paraguard           Other Topics Concern    Not on file          Social History Narrative     ** Merged History Encounter **                   Family History   Problem Relation Age of Onset    Diabetes Mother      Hyperlipidemia Mother      Hypertension Father      Hyperlipidemia Father      Cerebral palsy Brother         half brother; dad's side    Heart disease Maternal Grandmother      Diabetes Maternal Grandmother      Heart disease Maternal Grandfather      Kidney disease Maternal Grandfather      Heart disease Paternal Grandmother      Heart disease Paternal Grandfather      Breast cancer Neg Hx      Colon cancer Neg Hx      Ovarian cancer Neg Hx        The following portions of the patient's history were reviewed and updated as appropriate: allergies, current medications, past family history, past medical history, past social history, past surgical history and problem list.     Review of Systems   Constitutional: Negative for activity change.   Genitourinary: Positive for menorrhagia, menstrual problem and pelvic pain.   All other systems reviewed and are negative.           Objective:   Vital Signs:      Vitals:     04/20/18 1606   BP: 124/82   Pulse: (!) 50   Resp: 16         Physical Exam:  General:  alert; oriented;  well-nourished female   Cardiovascular:  Pulmonary:  Abdomen: Regular rate and rhythm  Clear to auscultation bilaterallt   soft, mildly tender in bilateral lower abdomen (L>R); no rebound or guarding.   Bowel sounds normal. No masses,  no organomegaly   Pelvis: Deferred         Assessment:       1. Menorrhagia with irregular cycle    2. Dysmenorrhea    3. Pelvic pain in female    4. Unwanted fertility             Plan:       Menorrhagia with irregular cycle  -     Case Request Operating Room: LAPAROSCOPY-DIAGNOSTIC, DILATION-CURETTAGE OF UTERUS (D AND C), ABLATION ENDOMETRIAL THERMAL - NOVASURE, PLACEMENT INTRAUTERINE DEVICE (IUD)     Dysmenorrhea  -     Case Request Operating Room: LAPAROSCOPY-DIAGNOSTIC, DILATION-CURETTAGE OF UTERUS (D AND C), ABLATION ENDOMETRIAL THERMAL - NOVASURE, PLACEMENT INTRAUTERINE DEVICE (IUD)     Pelvic pain in female  -     Case Request Operating Room: LAPAROSCOPY-DIAGNOSTIC, DILATION-CURETTAGE OF UTERUS (D AND C), ABLATION ENDOMETRIAL THERMAL - NOVASURE, PLACEMENT INTRAUTERINE DEVICE (IUD)     Unwanted fertility  -     Case Request Operating Room: LAPAROSCOPY-DIAGNOSTIC, DILATION-CURETTAGE OF UTERUS (D AND C), ABLATION ENDOMETRIAL THERMAL - NOVASURE, PLACEMENT INTRAUTERINE DEVICE (IUD)     Counseled on further evaluation and management options.   Pt desires to proceed with IUD removal/diagnostic laparoscopy/D&C/Novasure ablation/Mirena IUD placement.  Counseled on risks, benefits, alternatives, and potential side effects.   Pt verbalized understanding.  Consents previously signed.  Will proceed with IUD removal/diagnostic laparoscopy/D&C/Novasure ablation/Mirena IUD placement.

## 2018-06-01 NOTE — INTERVAL H&P NOTE
Past Medical History:  Past Medical History:   Diagnosis Date    Elevated cholesterol     Heart disorder     hole in heart    Migraine headache     Migraines     Murmur     Tachycardia        Past Surgical History:   Procedure Laterality Date    WISDOM TOOTH EXTRACTION  10/2012       Social History   Substance Use Topics    Smoking status: Never Smoker    Smokeless tobacco: Never Used    Alcohol use Yes      Comment: occ       Family History   Problem Relation Age of Onset    Diabetes Mother     Hyperlipidemia Mother     Hypertension Father     Hyperlipidemia Father     Cerebral palsy Brother         half brother; dad's side    Heart disease Maternal Grandmother     Diabetes Maternal Grandmother     Heart disease Maternal Grandfather     Kidney disease Maternal Grandfather     Heart disease Paternal Grandmother     Heart disease Paternal Grandfather     Breast cancer Neg Hx     Colon cancer Neg Hx     Ovarian cancer Neg Hx        Outpatient Prescriptions Marked as Taking for the 6/1/18 encounter (Hospital Encounter)   Medication Sig Dispense Refill    cetirizine (ZYRTEC) 10 MG tablet Take 10 mg by mouth once daily.      ketoconazole (NIZORAL) 2 % cream Apply topically 2 (two) times daily. 60 g 1    levonorgestrel (MIRENA) 20 mcg/24 hr (5 years) IUD 1 intra uterine device by intrauterine route once 1 Intra Uterine Device 0    propranolol (INDERAL LA) 80 MG 24 hr capsule Take 80 mg by mouth once daily.  10    topiramate (TOPAMAX) 50 MG tablet Take 1 tablet (50 mg total) by mouth 2 (two) times daily. (Patient taking differently: Take 50 mg by mouth once daily. ) 60 tablet 11       Review of patient's allergies indicates:   Allergen Reactions    Doxycycline Other (See Comments)     Yeast infection       The patient has been examined and the H&P has been reviewed:  I concur with the findings and no changes have occurred since H&P was written.    Patient cleared for Anesthesia:  General    Anesthesia/Surgery risks, benefits and alternative options discussed and understood by patient/family.    Active Hospital Problems    Diagnosis  POA    *Menorrhagia with irregular cycle [N92.1]  Yes      Resolved Hospital Problems    Diagnosis Date Resolved POA   No resolved problems to display.

## 2018-06-01 NOTE — PROGRESS NOTES
"Patient transferred back to room via bed. SCD's. Rates pain tolerable at a "2". V/S stable. Incisions x 3 to abdomen dry and intact. No distress noted.   "

## 2018-06-01 NOTE — LETTER
June 1, 2018         [address] [facility] [address]  Phone: [phone]  Fax: [phone]       Patient: Elizabeth Piña   YOB: 1995  Date of Visit: 06/01/2018    To Whom It May Concern:    Lona Piña  was at [facility] on 06/01/2018. She may return to work on 6/4/2018  with no lifting restrictions of 10 lbs until released by her doctor. If you have any questions or concerns, or if I can be of further assistance, please do not hesitate to contact me.    Sincerely,    Sabrina Garnica RN

## 2018-06-05 ENCOUNTER — PATIENT MESSAGE (OUTPATIENT)
Dept: OBSTETRICS AND GYNECOLOGY | Facility: CLINIC | Age: 23
End: 2018-06-05

## 2018-06-11 ENCOUNTER — PATIENT MESSAGE (OUTPATIENT)
Dept: OBSTETRICS AND GYNECOLOGY | Facility: CLINIC | Age: 23
End: 2018-06-11

## 2018-06-12 ENCOUNTER — TELEPHONE (OUTPATIENT)
Dept: OBSTETRICS AND GYNECOLOGY | Facility: CLINIC | Age: 23
End: 2018-06-12

## 2018-06-25 ENCOUNTER — OFFICE VISIT (OUTPATIENT)
Dept: OBSTETRICS AND GYNECOLOGY | Facility: CLINIC | Age: 23
End: 2018-06-25
Payer: COMMERCIAL

## 2018-06-25 VITALS
HEIGHT: 67 IN | BODY MASS INDEX: 32.02 KG/M2 | RESPIRATION RATE: 18 BRPM | HEART RATE: 74 BPM | SYSTOLIC BLOOD PRESSURE: 118 MMHG | WEIGHT: 204 LBS | DIASTOLIC BLOOD PRESSURE: 76 MMHG

## 2018-06-25 DIAGNOSIS — Z98.890 S/P D&C (STATUS POST DILATION AND CURETTAGE): ICD-10-CM

## 2018-06-25 DIAGNOSIS — Z09 POSTOP CHECK: Primary | ICD-10-CM

## 2018-06-25 DIAGNOSIS — Z98.890 S/P ENDOMETRIAL ABLATION: ICD-10-CM

## 2018-06-25 DIAGNOSIS — Z87.42 S/P OVARIAN CYSTECTOMY: ICD-10-CM

## 2018-06-25 DIAGNOSIS — Z98.890 S/P OVARIAN CYSTECTOMY: ICD-10-CM

## 2018-06-25 DIAGNOSIS — Z98.890 S/P LAPAROSCOPY: ICD-10-CM

## 2018-06-25 PROCEDURE — 99999 PR PBB SHADOW E&M-EST. PATIENT-LVL III: CPT | Mod: PBBFAC,,, | Performed by: OBSTETRICS & GYNECOLOGY

## 2018-06-25 PROCEDURE — 99024 POSTOP FOLLOW-UP VISIT: CPT | Mod: S$GLB,,, | Performed by: OBSTETRICS & GYNECOLOGY

## 2018-06-25 NOTE — PROGRESS NOTES
Subjective:    Patient ID: Elziabeth Piña is a 22 y.o.. Female.    Chief Complaint:   Chief Complaint   Patient presents with    Post-op Evaluation       History of Present Illness:   Elizabeth presents today ~ 2 weeks Post Op diagnostic laparoscopy/ovarian cystectomy/MARGARITA/IUD removal/D&C/Novasure ablation/IUD placement and is here for followup.  She has no complaints today. She is tolerating a regular diet with no nausea or vomiting.  She is voiding well and having normal bowel movements.  She denies vaginal bleeding, vaginal discharge, or fever. She denies increasing pain and is no longer requiring pain medication.  S    Pathology: benign; reviewed with patient.    The following portions of the patient's history were reviewed and updated as appropriate: allergies, current medications, past family history, past medical history, past social history, past surgical history and problem list.        Objective:   Vital Signs:  Vitals:    06/25/18 1449   BP: 118/76   Pulse: 74   Resp: 18       Physical Exam:  General:  alert; oriented; well-nourished female   Skin:  Skin color, texture, turgor normal. No rashes or lesions   Cardiovascular:  Pulmonary:  Abdomen: Regular rate and rhythm  Clear to auscultation bilaterally  soft, non-tender; bowel sounds normal. Incisions x 3 well healed.    Pelvis: Deferred         Impresssion:  Encounter Diagnoses   Name Primary?    Postop check Yes    S/P laparoscopy     S/P D&C (status post dilation and curettage)     S/P endometrial ablation     S/P ovarian cystectomy          Plan:  Postop check    S/P laparoscopy    S/P D&C (status post dilation and curettage)    S/P endometrial ablation    S/P ovarian cystectomy        Return PRN if having postoperative complications  Reviewed postoperative precautions and instructions.  She verbalizes understanding

## 2018-07-01 ENCOUNTER — OFFICE VISIT (OUTPATIENT)
Dept: URGENT CARE | Facility: CLINIC | Age: 23
End: 2018-07-01
Payer: COMMERCIAL

## 2018-07-01 VITALS
DIASTOLIC BLOOD PRESSURE: 92 MMHG | BODY MASS INDEX: 31.39 KG/M2 | HEART RATE: 63 BPM | WEIGHT: 200 LBS | OXYGEN SATURATION: 99 % | TEMPERATURE: 98 F | HEIGHT: 67 IN | SYSTOLIC BLOOD PRESSURE: 128 MMHG | RESPIRATION RATE: 16 BRPM

## 2018-07-01 DIAGNOSIS — R10.9 ABDOMINAL PAIN, UNSPECIFIED ABDOMINAL LOCATION: ICD-10-CM

## 2018-07-01 DIAGNOSIS — N30.00 ACUTE CYSTITIS WITHOUT HEMATURIA: Primary | ICD-10-CM

## 2018-07-01 LAB
BILIRUB UR QL STRIP: NEGATIVE
GLUCOSE UR QL STRIP: NEGATIVE
KETONES UR QL STRIP: NEGATIVE
LEUKOCYTE ESTERASE UR QL STRIP: NEGATIVE
PH, POC UA: 5.5 (ref 5–8)
POC BLOOD, URINE: NEGATIVE
POC NITRATES, URINE: NEGATIVE
PROT UR QL STRIP: NEGATIVE
SP GR UR STRIP: 1.02 (ref 1–1.03)
UROBILINOGEN UR STRIP-ACNC: NORMAL (ref 0.1–1.1)

## 2018-07-01 PROCEDURE — 3008F BODY MASS INDEX DOCD: CPT | Mod: CPTII,S$GLB,, | Performed by: NURSE PRACTITIONER

## 2018-07-01 PROCEDURE — 81003 URINALYSIS AUTO W/O SCOPE: CPT | Mod: QW,S$GLB,, | Performed by: NURSE PRACTITIONER

## 2018-07-01 PROCEDURE — 99214 OFFICE O/P EST MOD 30 MIN: CPT | Mod: 25,S$GLB,, | Performed by: NURSE PRACTITIONER

## 2018-07-01 RX ORDER — RIZATRIPTAN BENZOATE 5 MG/1
5 TABLET ORAL
COMMUNITY
End: 2018-10-15

## 2018-07-01 RX ORDER — CIPROFLOXACIN 500 MG/1
500 TABLET ORAL 2 TIMES DAILY
Qty: 14 TABLET | Refills: 0 | Status: SHIPPED | OUTPATIENT
Start: 2018-07-01 | End: 2018-07-08

## 2018-07-01 RX ORDER — PHENAZOPYRIDINE HYDROCHLORIDE 100 MG/1
100 TABLET, FILM COATED ORAL
Qty: 10 TABLET | Refills: 0 | Status: SHIPPED | OUTPATIENT
Start: 2018-07-01 | End: 2018-07-04

## 2018-07-01 NOTE — PROGRESS NOTES
"Subjective:       Patient ID: Elizabeth Piña is a 22 y.o. female.    Vitals:  height is 5' 7" (1.702 m) and weight is 90.7 kg (200 lb). Her oral temperature is 98 °F (36.7 °C). Her blood pressure is 128/92 (abnormal) and her pulse is 63. Her respiration is 16 and oxygen saturation is 99%.     Chief Complaint: Abdominal Pain    22 year old female, 4 weeks post GYN surgery, presents with complaints of dysuria that started today. Denies fever. States symptoms are similar to past UTIs. Patient works at [employer], however, symptoms began today and office closed.       Abdominal Pain   This is a new problem. The current episode started today. The onset quality is sudden. The problem occurs intermittently. The problem has been unchanged. The pain is located in the LUQ and LLQ. The pain is at a severity of 8/10. The pain is severe. The quality of the pain is cramping and sharp. The abdominal pain radiates to the back and right flank. Associated symptoms include dysuria, frequency and nausea. Pertinent negatives include no constipation, diarrhea, fever, hematochezia, hematuria, melena or vomiting. The pain is aggravated by movement. Treatments tried: ibuprofen. The treatment provided no relief. Her past medical history is significant for abdominal surgery. Patient's medical history includes UTI.     Review of Systems   Constitution: Negative for chills and fever.   Cardiovascular: Negative for chest pain.   Respiratory: Negative for shortness of breath.    Skin: Negative for rash.   Musculoskeletal: Positive for back pain.   Gastrointestinal: Positive for abdominal pain and nausea. Negative for constipation, diarrhea, hematochezia, melena and vomiting.   Genitourinary: Positive for dysuria, flank pain and frequency. Negative for hematuria.   Neurological: Negative for dizziness.   Psychiatric/Behavioral: Negative for altered mental status.   All other systems reviewed and are negative.      Objective:      Physical Exam "   Constitutional: She is oriented to person, place, and time. She appears well-developed and well-nourished.   HENT:   Head: Normocephalic and atraumatic.   Right Ear: External ear normal.   Left Ear: External ear normal.   Nose: Nose normal. No nasal deformity. No epistaxis.   Mouth/Throat: Oropharynx is clear and moist and mucous membranes are normal.   Eyes: Conjunctivae and lids are normal.   Neck: Trachea normal, normal range of motion and phonation normal. Neck supple.   Cardiovascular: Normal rate, regular rhythm, normal heart sounds and normal pulses.    Pulmonary/Chest: Effort normal and breath sounds normal.   Abdominal: Soft. Normal appearance and bowel sounds are normal. She exhibits no distension and no mass. There is no tenderness. There is no CVA tenderness.   Neurological: She is alert and oriented to person, place, and time.   Skin: Skin is warm, dry and intact.   Psychiatric: She has a normal mood and affect. Her speech is normal and behavior is normal. Cognition and memory are normal.   Nursing note and vitals reviewed.      Assessment:       1. Acute cystitis without hematuria    2. Abdominal pain, unspecified abdominal location        Plan:         Acute cystitis without hematuria  -     Urine culture  -     ciprofloxacin HCl (CIPRO) 500 MG tablet; Take 1 tablet (500 mg total) by mouth 2 (two) times daily. for 7 days  Dispense: 14 tablet; Refill: 0  -     phenazopyridine (PYRIDIUM) 100 MG tablet; Take 1 tablet (100 mg total) by mouth 3 (three) times daily with meals. for 3 days  Dispense: 10 tablet; Refill: 0    Abdominal pain, unspecified abdominal location  -     POCT Urinalysis, Dipstick, Automated, W/O Scope

## 2018-07-01 NOTE — PATIENT INSTRUCTIONS
"*Urinary Tract Infections*  1) Avoid tub baths.  2) Always urinate after intercourse(Teens/Adults).  3) Avoid "Fizzy" drinks/soda drinks.  4) Always wipe from front to back.  5) Wear only cotton underwear.  6) Drink a lot of fluids (at least 8-10 glasses of water) for 5 to 7 days to help flush your kidneys. You can also drink 1 shot-sized glass of cranberry juice 3X daily over the next several days to help cleanse your bladder, but studies show that cranberry juice does not cure or prevent a UTI.   7) Take all medications as directed. Make sure to complete all antibiotics as prescribed.    8) For patients above 6 months of age who are not allergic to and are not on anticoagulants, you can alternate Tylenol and Motrin every 4-6 hours for fever above 100.4F and/or pain.  For patients less than 6 months of age, allergic to or intolerant to NSAIDS, have gastritis, gastric ulcers, or history of GI bleeds, are pregnant, or are on anticoagulant therapy, you can take Tylenol every 4 hours as needed for fever above 100.4F and/or pain.   9) You should schedule a follow-up appointment with your Primary Care Provider/Pediatrician for recheck in 2-3 days or as directed at this visit.   10) If your condition fails to improve in a timely manner, you should receive another evaluation by your Primary Care Provider/Pediatrician to discuss your concerns or return to urgent care for a recheck.  If your condition worsens at any time, you should report immediately to your nearest Emergency Department for further evaluation. **You must understand that you have received Urgent Care treatment only and that you may be released before all of your medical problems are known or treated. You, the patient, are responsible to arrange for follow-up care as instructed.         "

## 2018-07-05 ENCOUNTER — TELEPHONE (OUTPATIENT)
Dept: URGENT CARE | Facility: CLINIC | Age: 23
End: 2018-07-05

## 2018-07-05 LAB
BACTERIA UR CULT: NO GROWTH
BACTERIA UR CULT: NORMAL

## 2018-07-09 RX ORDER — RIZATRIPTAN BENZOATE 5 MG/1
TABLET ORAL
Qty: 9 TABLET | Refills: 0 | Status: SHIPPED | OUTPATIENT
Start: 2018-07-09 | End: 2018-10-22 | Stop reason: SDUPTHER

## 2018-07-09 NOTE — TELEPHONE ENCOUNTER
Elizabeth desire refill of Maxalt. Post op D&C 6/1/18 with Dr. Aguillon  Patient Active Problem List   Diagnosis    Migraine headache    Bronchitis    PFO (patent foramen ovale)    H/O sinus tachycardia    Menorrhagia with irregular cycle    Unwanted fertility    Pelvic pain in female    Dysmenorrhea    Endometriosis    Encounter for removal and reinsertion of IUD     Prior to Admission medications    Medication Sig Start Date End Date Taking? Authorizing Provider   cetirizine (ZYRTEC) 10 MG tablet Take 10 mg by mouth once daily.    Historical Provider, MD   ciprofloxacin HCl (CIPRO) 500 MG tablet Take 1 tablet (500 mg total) by mouth 2 (two) times daily. for 7 days 7/1/18 7/8/18  Nessa Garcia NP   levonorgestrel (MIRENA) 20 mcg/24 hr (5 years) IUD 1 intra uterine device by intrauterine route once 4/25/18 4/25/19  Olivia Aguillon MD   propranolol (INDERAL LA) 80 MG 24 hr capsule Take 80 mg by mouth once daily. 2/19/18   Historical Provider, MD   rizatriptan (MAXALT) 5 MG tablet Take 5 mg by mouth as needed for Migraine.    Historical Provider, MD   topiramate (TOPAMAX) 50 MG tablet Take 1 tablet (50 mg total) by mouth 2 (two) times daily.  Patient taking differently: Take 50 mg by mouth once daily.  5/7/18 5/7/19  Scar Bustos MD

## 2018-07-13 ENCOUNTER — OFFICE VISIT (OUTPATIENT)
Dept: OBSTETRICS AND GYNECOLOGY | Facility: CLINIC | Age: 23
End: 2018-07-13
Payer: COMMERCIAL

## 2018-07-13 VITALS
WEIGHT: 202 LBS | HEART RATE: 81 BPM | RESPIRATION RATE: 13 BRPM | DIASTOLIC BLOOD PRESSURE: 74 MMHG | SYSTOLIC BLOOD PRESSURE: 106 MMHG | BODY MASS INDEX: 31.71 KG/M2 | HEIGHT: 67 IN

## 2018-07-13 DIAGNOSIS — Z09 POSTOPERATIVE FOLLOW-UP: Primary | ICD-10-CM

## 2018-07-13 PROCEDURE — 99024 POSTOP FOLLOW-UP VISIT: CPT | Mod: S$GLB,,, | Performed by: OBSTETRICS & GYNECOLOGY

## 2018-07-13 PROCEDURE — 99999 PR PBB SHADOW E&M-EST. PATIENT-LVL III: CPT | Mod: PBBFAC,,, | Performed by: OBSTETRICS & GYNECOLOGY

## 2018-07-13 NOTE — PROGRESS NOTES
Subjective:       Patient ID: Elizabeth Piña is a 22 y.o. female.    Chief Complaint:  Post-op Evaluation      History of Present Illness   patient presents for follow-up from diagnostic laparoscopy.  Patient states she has no pain at all and no bleeding.  She is otherwise without gyn complaints.    Menstrual History:  OB History      Para Term  AB Living    2         2    SAB TAB Ectopic Multiple Live Births            2         Menarche age:     Patient's last menstrual period was 2018 (approximate).         Review of Systems  Review of Systems   Constitutional: Negative for activity change, appetite change, chills, diaphoresis, fatigue, fever and unexpected weight change.   HENT: Negative for congestion, dental problem, drooling, ear discharge, ear pain, facial swelling, hearing loss, mouth sores, nosebleeds, postnasal drip, rhinorrhea, sinus pressure, sneezing, sore throat, tinnitus, trouble swallowing and voice change.    Eyes: Negative for photophobia, pain, discharge, redness, itching and visual disturbance.   Respiratory: Negative for apnea, cough, choking, chest tightness, shortness of breath, wheezing and stridor.    Cardiovascular: Negative for chest pain, palpitations and leg swelling.   Gastrointestinal: Negative for abdominal distention, abdominal pain, anal bleeding, blood in stool, constipation, diarrhea, nausea, rectal pain and vomiting.   Endocrine: Negative for cold intolerance, heat intolerance, polydipsia, polyphagia and polyuria.   Genitourinary: Negative for decreased urine volume, difficulty urinating, dyspareunia, dysuria, enuresis, flank pain, frequency, genital sores, hematuria, menstrual problem, pelvic pain, urgency, vaginal bleeding, vaginal discharge and vaginal pain.   Musculoskeletal: Negative for arthralgias, back pain, gait problem, joint swelling, myalgias, neck pain and neck stiffness.   Skin: Negative for color change, pallor, rash and wound.    Allergic/Immunologic: Negative for environmental allergies, food allergies and immunocompromised state.   Neurological: Negative for dizziness, tremors, seizures, syncope, facial asymmetry, speech difficulty, weakness, light-headedness, numbness and headaches.   Hematological: Negative for adenopathy. Does not bruise/bleed easily.   Psychiatric/Behavioral: Negative for agitation, behavioral problems, confusion, decreased concentration, dysphoric mood, hallucinations, self-injury, sleep disturbance and suicidal ideas. The patient is not nervous/anxious and is not hyperactive.            Objective:    Physical Exam   Nursing note and vitals reviewed.   Incision clean dry and intact     Assessment:        1. Postoperative follow-up               Plan:         Elizabeth was seen today for post-op evaluation.    Diagnoses and all orders for this visit:    Postoperative follow-up

## 2018-08-17 ENCOUNTER — HOSPITAL ENCOUNTER (OUTPATIENT)
Dept: RADIOLOGY | Facility: HOSPITAL | Age: 23
Discharge: HOME | End: 2018-08-17
Attending: INTERNAL MEDICINE
Payer: COMMERCIAL

## 2018-08-17 ENCOUNTER — OFFICE VISIT (OUTPATIENT)
Dept: INTERNAL MEDICINE | Facility: CLINIC | Age: 23
End: 2018-08-17
Payer: COMMERCIAL

## 2018-08-17 VITALS
WEIGHT: 201.25 LBS | SYSTOLIC BLOOD PRESSURE: 122 MMHG | DIASTOLIC BLOOD PRESSURE: 84 MMHG | BODY MASS INDEX: 31.59 KG/M2 | HEIGHT: 67 IN | RESPIRATION RATE: 18 BRPM | HEART RATE: 67 BPM

## 2018-08-17 DIAGNOSIS — R53.1 WEAKNESS: Primary | ICD-10-CM

## 2018-08-17 DIAGNOSIS — N89.8 VAGINAL ITCHING: ICD-10-CM

## 2018-08-17 DIAGNOSIS — S52.102A CLOSED FRACTURE OF PROXIMAL END OF LEFT RADIUS, UNSPECIFIED FRACTURE MORPHOLOGY, INITIAL ENCOUNTER: ICD-10-CM

## 2018-08-17 DIAGNOSIS — M25.522 LEFT ELBOW PAIN: ICD-10-CM

## 2018-08-17 DIAGNOSIS — G43.019 INTRACTABLE MIGRAINE WITHOUT AURA AND WITHOUT STATUS MIGRAINOSUS: ICD-10-CM

## 2018-08-17 LAB
BILIRUB SERPL-MCNC: 0 MG/DL
BLOOD URINE, POC: ABNORMAL
COLOR, POC UA: ABNORMAL
GLUCOSE UR QL STRIP: 0
KETONES UR QL STRIP: 0
LEUKOCYTE ESTERASE URINE, POC: ABNORMAL
NITRITE, POC UA: 0
PH, POC UA: 8
PROTEIN, POC: ABNORMAL
SPECIFIC GRAVITY, POC UA: 1.01
UROBILINOGEN, POC UA: 0

## 2018-08-17 PROCEDURE — 81002 URINALYSIS NONAUTO W/O SCOPE: CPT | Mod: S$GLB,,, | Performed by: INTERNAL MEDICINE

## 2018-08-17 PROCEDURE — 87086 URINE CULTURE/COLONY COUNT: CPT

## 2018-08-17 PROCEDURE — 99999 PR PBB SHADOW E&M-EST. PATIENT-LVL IV: CPT | Mod: PBBFAC,,, | Performed by: INTERNAL MEDICINE

## 2018-08-17 PROCEDURE — 73070 X-RAY EXAM OF ELBOW: CPT | Mod: TC,LT

## 2018-08-17 PROCEDURE — 3008F BODY MASS INDEX DOCD: CPT | Mod: CPTII,S$GLB,, | Performed by: INTERNAL MEDICINE

## 2018-08-17 PROCEDURE — 73070 X-RAY EXAM OF ELBOW: CPT | Mod: 26,LT,, | Performed by: RADIOLOGY

## 2018-08-17 PROCEDURE — 99214 OFFICE O/P EST MOD 30 MIN: CPT | Mod: 25,S$GLB,, | Performed by: INTERNAL MEDICINE

## 2018-08-17 NOTE — PROGRESS NOTES
"Subjective:       Patient ID: Elizabeth Piña is a 23 y.o. female.    Chief Complaint: Weakness      HPI:  Patient is known to me and presents with weakness. Had symptoms at work 1 month ago and has not happened again since. Has had this before and usually gets better with eating. This time happened after eating. She checked her blood sugar and blood pressure and was normal. Denies chest pains, SOB, palpitations. Just felt generally weak. No syncope.     She had a fall from skateboard 8 days ago. Feel on to left arm. xrays showed "Tiny linear lucency lateral cortex left radial neck with adjacent thin horizontally oriented linear lucency may in the setting of acute trauma represent nondisplaced fracture. " Has not seen ortho. Still having pain and swelling around the left elbow. Taking ibuprofen.     She also reports vaginal itching. No discharge or abnormal bleeding. No dysuria, hematuria, frequency. No fevers.       Past Medical History:   Diagnosis Date    Elevated cholesterol     Heart disorder     hole in heart    Migraine headache     Migraines     Murmur     Tachycardia        Family History   Problem Relation Age of Onset    Diabetes Mother     Hyperlipidemia Mother     Hypertension Father     Hyperlipidemia Father     Cerebral palsy Brother         half brother; dad's side    Heart disease Maternal Grandmother     Diabetes Maternal Grandmother     Heart disease Maternal Grandfather     Kidney disease Maternal Grandfather     Heart disease Paternal Grandmother     Heart disease Paternal Grandfather     Breast cancer Neg Hx     Colon cancer Neg Hx     Ovarian cancer Neg Hx        Social History     Socioeconomic History    Marital status: Legally      Spouse name: Not on file    Number of children: Not on file    Years of education: Not on file    Highest education level: Not on file   Social Needs    Financial resource strain: Not on file    Food insecurity - worry: Not " on file    Food insecurity - inability: Not on file    Transportation needs - medical: Not on file    Transportation needs - non-medical: Not on file   Occupational History    Not on file   Tobacco Use    Smoking status: Never Smoker    Smokeless tobacco: Never Used   Substance and Sexual Activity    Alcohol use: Yes     Comment: occ    Drug use: No    Sexual activity: Yes     Partners: Male     Birth control/protection: IUD     Comment: paraguard   Other Topics Concern    Not on file   Social History Narrative    ** Merged History Encounter **            Review of Systems   Constitutional: Negative for activity change, fatigue, fever and unexpected weight change.   HENT: Negative for congestion, ear pain, hearing loss, rhinorrhea, sore throat and tinnitus.    Eyes: Negative for pain, redness and visual disturbance.   Respiratory: Negative for cough, shortness of breath and wheezing.    Cardiovascular: Negative for chest pain, palpitations and leg swelling.   Gastrointestinal: Negative for abdominal pain, blood in stool, constipation, diarrhea, nausea and vomiting.   Genitourinary: Positive for vaginal pain (itching). Negative for dysuria, frequency, pelvic pain and urgency.   Musculoskeletal: Positive for arthralgias (left elbow pain). Negative for back pain, joint swelling and neck pain.   Skin: Negative for color change, rash and wound.   Neurological: Positive for weakness (generalized). Negative for dizziness, tremors, light-headedness and headaches.         Objective:      Physical Exam   Constitutional: She is oriented to person, place, and time. She appears well-developed and well-nourished. No distress.   HENT:   Head: Normocephalic and atraumatic.   Right Ear: External ear normal.   Left Ear: External ear normal.   Eyes: Conjunctivae and EOM are normal. Pupils are equal, round, and reactive to light. Right eye exhibits no discharge. Left eye exhibits no discharge.   Neck: Neck supple. No tracheal  deviation present.   Cardiovascular: Normal rate and regular rhythm.   No murmur heard.  Pulmonary/Chest: Effort normal and breath sounds normal. No respiratory distress. She has no wheezes.   Abdominal: Soft. Bowel sounds are normal. She exhibits no distension. There is no tenderness.   Musculoskeletal: She exhibits tenderness (left latearl elbow). She exhibits no edema.   Neurological: She is alert and oriented to person, place, and time. No cranial nerve deficit.   Skin: Skin is warm and dry.   Psychiatric: She has a normal mood and affect. Her behavior is normal.   Nursing note and vitals reviewed.      Assessment:       1. Weakness    2. Closed fracture of proximal end of left radius, unspecified fracture morphology, initial encounter    3. Intractable migraine without aura and without status migrainosus    4. Vaginal itching    5. Left elbow pain        Plan:       Elizabeth was seen today for weakness.    Diagnoses and all orders for this visit:    Weakness  -     POCT URINE DIPSTICK WITHOUT MICROSCOPE  New problem  UA + LE otherwise noramal. Check cx first. If noraml see GYN for vaginal itching   Etiology of weakness unclear  Labs 5/2018 reviewed, all noramal  No palpitations  No other risk factors  Monitor for now. Eat frequently and keep snacks    Closed fracture of proximal end of left radius, unspecified fracture morphology, initial encounter  -     X-Ray Elbow 2 Views Left; Future  New problem  Repeat imaging, still having pain  Will see ortho    Intractable migraine without aura and without status migrainosus  Chronic controlled  Cont PRN maxal and topamax and propranolol    Vaginal itching  -     Urine culture; Future  -     Urine culture  New problem  UA + LE otherwise noramal. Check cx first. If noraml see GYN for vaginal itching     Left elbow pain  -     X-Ray Elbow 2 Views Left; Future  Repeat imaging, still having pain  Will see ortho

## 2018-08-19 LAB — BACTERIA UR CULT: NO GROWTH

## 2018-09-06 ENCOUNTER — PATIENT MESSAGE (OUTPATIENT)
Dept: OBSTETRICS AND GYNECOLOGY | Facility: CLINIC | Age: 23
End: 2018-09-06

## 2018-09-07 ENCOUNTER — OFFICE VISIT (OUTPATIENT)
Dept: OBSTETRICS AND GYNECOLOGY | Facility: CLINIC | Age: 23
End: 2018-09-07
Payer: COMMERCIAL

## 2018-09-07 ENCOUNTER — PATIENT MESSAGE (OUTPATIENT)
Dept: OBSTETRICS AND GYNECOLOGY | Facility: CLINIC | Age: 23
End: 2018-09-07

## 2018-09-07 VITALS
BODY MASS INDEX: 31.96 KG/M2 | SYSTOLIC BLOOD PRESSURE: 112 MMHG | WEIGHT: 203.63 LBS | HEART RATE: 76 BPM | DIASTOLIC BLOOD PRESSURE: 84 MMHG | HEIGHT: 67 IN | RESPIRATION RATE: 14 BRPM

## 2018-09-07 DIAGNOSIS — N76.0 VAGINOSIS: Primary | ICD-10-CM

## 2018-09-07 PROCEDURE — 87510 GARDNER VAG DNA DIR PROBE: CPT

## 2018-09-07 PROCEDURE — 3008F BODY MASS INDEX DOCD: CPT | Mod: CPTII,S$GLB,, | Performed by: OBSTETRICS & GYNECOLOGY

## 2018-09-07 PROCEDURE — 99213 OFFICE O/P EST LOW 20 MIN: CPT | Mod: S$GLB,,, | Performed by: OBSTETRICS & GYNECOLOGY

## 2018-09-07 PROCEDURE — 87480 CANDIDA DNA DIR PROBE: CPT

## 2018-09-07 PROCEDURE — 99999 PR PBB SHADOW E&M-EST. PATIENT-LVL III: CPT | Mod: PBBFAC,,, | Performed by: OBSTETRICS & GYNECOLOGY

## 2018-09-07 RX ORDER — FLUCONAZOLE 150 MG/1
150 TABLET ORAL ONCE
Qty: 1 TABLET | Refills: 1 | Status: SHIPPED | OUTPATIENT
Start: 2018-09-07 | End: 2018-09-07

## 2018-09-07 NOTE — PROGRESS NOTES
Subjective:    Patient ID: Elizabeth Piña is a 23 y.o. y.o. female.     Chief Complaint:   Chief Complaint   Patient presents with    Vaginal Itching       History of Present Illness   Elizabeth presents today complaining of discharge and vaginal itching. Symptoms began 1 months and have been persistent despite OTC treatment.  No LMP recorded. Patient has had an implant.. She also denies dysuria and abdominal pain. She is sexually active.    ROS:   CONSTITUTIONAL: Negative for fever, chills, diaphoresis, weakness, fatigue, weight loss, weight gain  GASTROINTESTINAL: negative for abdominal pain, flank pain, nausea, vomiting, diarrhea, constipation, black stool, blood in stool  BREAST: negative for breast  tenderness, breast mass, nipple discharge, or skin changes  GENITOURINARY: genital discharge, negative for dysuria, frequency/urgency, hematuria, vaginal bleeding, irregular menses, heavy menses, pelvic pain  HEMATOLOGIC/LYMPHATIC: negative for swollen lymph nodes, bleeding, bruising  MUSCULOSKELETAL: negative for back pain, joint pain, joint stiffness, joint swelling, muscle pain, muscle weakness  ENDOCRINE: negative for polydipsia/polyuria, palpitations, skin changes, temperature intolerance, unexpected weight changes      Objective:    Vital Signs:  Vitals:    09/07/18 1413   BP: 112/84   Pulse: 76   Resp: 14       Physical Exam:  General:  alert, cooperative, no distress   Neuro/Psych: AAOx3, appropriate mood and affect   Head: Normocephalic, atraumatic   Neck: Supple, normal ROM   Resp: Normal effort   Skin:  Skin color, texture, turgor normal. No rashes or lesions   Abdomen:  soft, NT   Pelvis: External genitalia: normal general appearance  Urinary system: urethral meatus normal, bladder nontender  Vaginal: normal mucosa without prolapse or lesions, discharge, white  Cervix: normal appearance  Uterus: normal size, shape, position  Adnexa: normal size, nontender bilaterally         Assessment:      1.  Vaginosis          Plan:      Vaginosis  -     Vaginosis Screen by DNA Probe    Other orders  -     fluconazole (DIFLUCAN) 150 MG Tab; Take 1 tablet (150 mg total) by mouth once. for 1 dose  Dispense: 1 tablet; Refill: 1

## 2018-09-11 LAB
CANDIDA RRNA VAG QL PROBE: NEGATIVE
G VAGINALIS RRNA GENITAL QL PROBE: NEGATIVE
T VAGINALIS RRNA GENITAL QL PROBE: NEGATIVE

## 2018-09-19 ENCOUNTER — PATIENT MESSAGE (OUTPATIENT)
Dept: INTERNAL MEDICINE | Facility: CLINIC | Age: 23
End: 2018-09-19

## 2018-10-11 ENCOUNTER — PATIENT MESSAGE (OUTPATIENT)
Dept: NEUROLOGY | Facility: CLINIC | Age: 23
End: 2018-10-11

## 2018-10-11 ENCOUNTER — PATIENT MESSAGE (OUTPATIENT)
Dept: INTERNAL MEDICINE | Facility: CLINIC | Age: 23
End: 2018-10-11

## 2018-10-12 ENCOUNTER — PATIENT MESSAGE (OUTPATIENT)
Dept: INTERNAL MEDICINE | Facility: CLINIC | Age: 23
End: 2018-10-12

## 2018-10-15 ENCOUNTER — OFFICE VISIT (OUTPATIENT)
Dept: INTERNAL MEDICINE | Facility: CLINIC | Age: 23
End: 2018-10-15
Payer: COMMERCIAL

## 2018-10-15 VITALS
BODY MASS INDEX: 32.83 KG/M2 | HEIGHT: 67 IN | RESPIRATION RATE: 18 BRPM | HEART RATE: 79 BPM | WEIGHT: 209.19 LBS | DIASTOLIC BLOOD PRESSURE: 80 MMHG | SYSTOLIC BLOOD PRESSURE: 120 MMHG

## 2018-10-15 DIAGNOSIS — R41.840 DIFFICULTY CONCENTRATING: Primary | ICD-10-CM

## 2018-10-15 PROCEDURE — 3008F BODY MASS INDEX DOCD: CPT | Mod: CPTII,S$GLB,, | Performed by: INTERNAL MEDICINE

## 2018-10-15 PROCEDURE — 99214 OFFICE O/P EST MOD 30 MIN: CPT | Mod: S$GLB,,, | Performed by: INTERNAL MEDICINE

## 2018-10-15 PROCEDURE — 99999 PR PBB SHADOW E&M-EST. PATIENT-LVL IV: CPT | Mod: PBBFAC,,, | Performed by: INTERNAL MEDICINE

## 2018-10-15 NOTE — PROGRESS NOTES
"Subjective:       Patient ID: Elizabeth Piña is a 23 y.o. female.    Chief Complaint: ADHD      HPI:  Patient is known to me and presents with concern for ADHD. She reports difficulty focusing at work. 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 She reports difficulty completing her tasks, especially on the computer. Her co-workers have noticed. She reports difficulty with completing tasks since childhood. She was tested in childhood and told she was "borderline". Was never on medications. Her brother was diagnosed with ADHD and was on medication as well. She describes her mood as "anxious". She does report poor sleep; she has tried elavil but made her too sleepy for work. She "tosses and turns at night"; reports at least 1 hour to fall asleep and difficulty staying asleep.       Past Medical History:   Diagnosis Date    Elevated cholesterol     Heart disorder     hole in heart    Migraine headache     Migraines     Murmur     Tachycardia        Family History   Problem Relation Age of Onset    Diabetes Mother     Hyperlipidemia Mother     Hypertension Father     Hyperlipidemia Father     Cerebral palsy Brother         half brother; dad's side    Heart disease Maternal Grandmother     Diabetes Maternal Grandmother     Heart disease Maternal Grandfather     Kidney disease Maternal Grandfather     Heart disease Paternal Grandmother     Heart disease Paternal Grandfather     Breast cancer Neg Hx     Colon cancer Neg Hx     Ovarian cancer Neg Hx        Social History     Socioeconomic History    Marital status: Legally      Spouse name: Not on file    Number of children: Not on file    Years of education: Not on file    Highest education level: Not on file   Social Needs    Financial resource strain: Not on file    Food insecurity - worry: Not on file    Food insecurity - inability: Not on file    Transportation needs - medical: " Not on file    Transportation needs - non-medical: Not on file   Occupational History    Not on file   Tobacco Use    Smoking status: Never Smoker    Smokeless tobacco: Never Used   Substance and Sexual Activity    Alcohol use: Yes     Comment: occ    Drug use: No    Sexual activity: Yes     Partners: Male     Birth control/protection: IUD     Comment: paraguard   Other Topics Concern    Not on file   Social History Narrative    ** Merged History Encounter **            Review of Systems   Constitutional: Negative for activity change, fatigue, fever and unexpected weight change.   HENT: Negative for congestion, ear pain, hearing loss, rhinorrhea and sore throat.    Eyes: Negative for pain, redness and visual disturbance.   Respiratory: Negative for cough, shortness of breath and wheezing.    Cardiovascular: Negative for chest pain, palpitations and leg swelling.   Gastrointestinal: Negative for abdominal pain, constipation, diarrhea, nausea and vomiting.   Genitourinary: Negative for dysuria, frequency and urgency.   Musculoskeletal: Negative for back pain, joint swelling and neck pain.   Skin: Negative for color change, rash and wound.   Neurological: Negative for dizziness, tremors, weakness, light-headedness and headaches.   Psychiatric/Behavioral: Positive for decreased concentration and sleep disturbance.         Objective:      Physical Exam   Constitutional: She is oriented to person, place, and time. She appears well-developed and well-nourished. No distress.   HENT:   Head: Normocephalic and atraumatic.   Right Ear: External ear normal.   Left Ear: External ear normal.   Eyes: Conjunctivae and EOM are normal. Pupils are equal, round, and reactive to light.   Neck: Neck supple. No tracheal deviation present.   Cardiovascular: Normal rate and regular rhythm.   Pulmonary/Chest: Effort normal. No respiratory distress.   Abdominal: Soft. She exhibits no distension.   Lymphadenopathy:     She has no  cervical adenopathy.   Neurological: She is alert and oriented to person, place, and time. No cranial nerve deficit.   Skin: Skin is warm and dry.   Psychiatric: She has a normal mood and affect. Her behavior is normal.   Vitals reviewed.      Assessment:       1. Difficulty concentrating        Plan:       Elizabeth was seen today for adhd.    Diagnoses and all orders for this visit:    Difficulty concentrating  -     Ambulatory Referral to Psychology    Sounds like has some ADD symptoms and was likely diagnosed in childhood. However also with poor sleep which could be contributing to her sx  The question is: does she need a stimulant for ADD or sedative for insomnia??   I'd like her to have a full evaluation with psychology to really determine if this is true ADD. If so, we can start stimulant. Likely vyvanse.  Discussed monthly prescriptions. No early refills. Discussed [id number] appointments and can not miss appointments. Voiced understanding

## 2018-10-22 RX ORDER — RIZATRIPTAN BENZOATE 5 MG/1
TABLET ORAL
Qty: 9 TABLET | Refills: 0 | Status: SHIPPED | OUTPATIENT
Start: 2018-10-22 | End: 2019-01-03 | Stop reason: SDUPTHER

## 2018-10-22 NOTE — TELEPHONE ENCOUNTER
Elizabeth desire refill of Maxalt. Last office visit 09/7/18. Dr. Aguillon patient. Please advise.  Patient Active Problem List   Diagnosis    Migraine headache    Bronchitis    PFO (patent foramen ovale)    H/O sinus tachycardia    Menorrhagia with irregular cycle    Unwanted fertility    Pelvic pain in female    Dysmenorrhea    Endometriosis    Encounter for removal and reinsertion of IUD     Prior to Admission medications    Medication Sig Start Date End Date Taking? Authorizing Provider   levonorgestrel (MIRENA) 20 mcg/24 hr (5 years) IUD 1 intra uterine device by intrauterine route once 4/25/18 4/25/19  Olivia Aguillon MD   propranolol (INDERAL LA) 80 MG 24 hr capsule Take 80 mg by mouth once daily. 2/19/18   Historical Provider, MD   rizatriptan (MAXALT) 5 MG tablet Take one daily as needed for headache. May repeat dose once after 2 hours if needed 7/9/18   Braydon Estrada MD   topiramate (TOPAMAX) 50 MG tablet Take 1 tablet (50 mg total) by mouth 2 (two) times daily. 5/7/18 5/7/19  Scar Bustos MD

## 2019-01-04 ENCOUNTER — OFFICE VISIT (OUTPATIENT)
Dept: OBSTETRICS AND GYNECOLOGY | Facility: CLINIC | Age: 24
End: 2019-01-04
Payer: COMMERCIAL

## 2019-01-04 ENCOUNTER — PROCEDURE VISIT (OUTPATIENT)
Dept: OBSTETRICS AND GYNECOLOGY | Facility: CLINIC | Age: 24
End: 2019-01-04
Payer: COMMERCIAL

## 2019-01-04 VITALS
HEART RATE: 74 BPM | DIASTOLIC BLOOD PRESSURE: 82 MMHG | HEIGHT: 67 IN | SYSTOLIC BLOOD PRESSURE: 114 MMHG | BODY MASS INDEX: 33.59 KG/M2 | RESPIRATION RATE: 14 BRPM | WEIGHT: 214 LBS

## 2019-01-04 DIAGNOSIS — N80.9 ENDOMETRIOSIS: ICD-10-CM

## 2019-01-04 DIAGNOSIS — R10.2 CHRONIC PELVIC PAIN IN FEMALE: ICD-10-CM

## 2019-01-04 DIAGNOSIS — R10.2 CHRONIC PELVIC PAIN IN FEMALE: Primary | ICD-10-CM

## 2019-01-04 DIAGNOSIS — G89.29 CHRONIC PELVIC PAIN IN FEMALE: ICD-10-CM

## 2019-01-04 DIAGNOSIS — G89.29 CHRONIC PELVIC PAIN IN FEMALE: Primary | ICD-10-CM

## 2019-01-04 PROCEDURE — 99999 PR PBB SHADOW E&M-EST. PATIENT-LVL III: CPT | Mod: PBBFAC,,, | Performed by: OBSTETRICS & GYNECOLOGY

## 2019-01-04 PROCEDURE — 3008F BODY MASS INDEX DOCD: CPT | Mod: CPTII,S$GLB,, | Performed by: OBSTETRICS & GYNECOLOGY

## 2019-01-04 PROCEDURE — 76830 PR  ECHOGRAPHY,TRANSVAGINAL: ICD-10-PCS | Mod: S$GLB,,, | Performed by: OBSTETRICS & GYNECOLOGY

## 2019-01-04 PROCEDURE — 76830 TRANSVAGINAL US NON-OB: CPT | Mod: S$GLB,,, | Performed by: OBSTETRICS & GYNECOLOGY

## 2019-01-04 PROCEDURE — 99214 OFFICE O/P EST MOD 30 MIN: CPT | Mod: 25,S$GLB,, | Performed by: OBSTETRICS & GYNECOLOGY

## 2019-01-04 PROCEDURE — 3008F PR BODY MASS INDEX (BMI) DOCUMENTED: ICD-10-PCS | Mod: CPTII,S$GLB,, | Performed by: OBSTETRICS & GYNECOLOGY

## 2019-01-04 PROCEDURE — 99999 PR PBB SHADOW E&M-EST. PATIENT-LVL III: ICD-10-PCS | Mod: PBBFAC,,, | Performed by: OBSTETRICS & GYNECOLOGY

## 2019-01-04 PROCEDURE — 99214 PR OFFICE/OUTPT VISIT, EST, LEVL IV, 30-39 MIN: ICD-10-PCS | Mod: 25,S$GLB,, | Performed by: OBSTETRICS & GYNECOLOGY

## 2019-01-04 RX ORDER — RIZATRIPTAN BENZOATE 5 MG/1
TABLET ORAL
Qty: 9 TABLET | Refills: 0 | Status: SHIPPED | OUTPATIENT
Start: 2019-01-04 | End: 2019-03-26 | Stop reason: SDUPTHER

## 2019-01-04 NOTE — PROGRESS NOTES
Chief Complaint:   Chief Complaint   Patient presents with    Pelvic Pain     times 3 months    Constipation       Subjective:  Patient is a 23 y.o.  2 para 2, female.  She presents for evaluation of chronic pelvic pain. Onset of symptoms was gradual starting several months ago with gradually worsening course since that time. The pain occurs all throughout the month. It is located across the entire lower pelvis with radiation to the low right pack and lasts a few hours. She describes the pain as cramping. Symptoms improve with NSAIDs sometimes. She also reports some pain and difficulty with defecation.   She has a Mirena for irregular bleeding. She has a h/o of endometrial ablation at the same time as Mirena placement.  At the same time, she had a diagnostic laparoscopy with ablation of extensive endometriosis.    Patient Active Problem List    Diagnosis Date Noted    Menorrhagia with irregular cycle 2018    Unwanted fertility 2018    Pelvic pain in female 2018    Dysmenorrhea 2018    Endometriosis 2018    Encounter for removal and reinsertion of IUD 2018    H/O sinus tachycardia 2018    PFO (patent foramen ovale) 2013    Migraine headache 2012    Bronchitis 2012     Past Medical History:   Diagnosis Date    Elevated cholesterol     Heart disorder     hole in heart    Migraine headache     Migraines     Murmur     Tachycardia       Past Surgical History:   Procedure Laterality Date    ABLATION ENDOMETRIAL THERMAL - NOVASURE N/A 2018    [facility]    DILATION-CURETTAGE OF UTERUS (D AND C) N/A 2018    [facility]    EXCISION, CYST, OVARY, LAPAROSCOPIC Left 2018    [facility]    FULGURATION, ENDOMETRIOSIS N/A 2018    [facility]    LAPAROSCOPY-DIAGNOSTIC N/A 2018    Performed by Olivia MONTE  [facility]    LYSIS, ADHESIONS N/A 6/1/2018    [facility]    PLACEMENT INTRAUTERINE DEVICE (IUD) N/A 6/1/2018    [facility]    REMOVAL, INTRAUTERINE DEVICE N/A 6/1/2018    [facility]    WISDOM TOOTH EXTRACTION  10/2012        (Not in a hospital admission)  Review of patient's allergies indicates:   Allergen Reactions    Doxycycline Other (See Comments)     Yeast infection      Social History     Tobacco Use    Smoking status: Never Smoker    Smokeless tobacco: Never Used   Substance Use Topics    Alcohol use: Yes     Comment: occ      Family History   Problem Relation Age of Onset    Diabetes Mother     Hyperlipidemia Mother     Hypertension Father     Hyperlipidemia Father     Cerebral palsy Brother         half brother; dad's side    Heart disease Maternal Grandmother     Diabetes Maternal Grandmother     Heart disease Maternal Grandfather     Kidney disease Maternal Grandfather     Heart disease Paternal Grandmother     Heart disease Paternal Grandfather     Breast cancer Neg Hx     Colon cancer Neg Hx     Ovarian cancer Neg Hx       Review of Systems  Review of Systems   Constitutional: Positive for malaise/fatigue. Negative for chills, diaphoresis, fever and weight loss.   HENT: Negative for congestion, hearing loss, nosebleeds, sore throat and tinnitus.    Eyes: Negative for blurred vision, pain and discharge.   Respiratory: Negative for cough, shortness of breath and wheezing.    Cardiovascular: Negative for chest pain, palpitations and leg swelling.   Gastrointestinal: Positive for abdominal pain (diffuse/low) and constipation. Negative for blood in stool, diarrhea, heartburn, nausea and vomiting.   Genitourinary: Positive for flank pain. Negative for dysuria, frequency, hematuria and urgency.   Musculoskeletal: Positive for back pain. Negative for joint pain and myalgias.   Skin:  "Negative for itching and rash.   Neurological: Negative for dizziness, focal weakness, seizures, weakness and headaches.   Endo/Heme/Allergies: Does not bruise/bleed easily.   Psychiatric/Behavioral: Negative for depression. The patient is not nervous/anxious.          Objective:    /82   Pulse 74   Resp 14   Ht 5' 7" (1.702 m)   Wt 97.1 kg (214 lb)   BMI 33.52 kg/m²   General appearance: alert, appears stated age, cooperative and no distress  Head: Normocephalic, without obvious abnormality, atraumatic  Abdomen: Soft, NT  Pelvic: cervix normal in appearance, external genitalia normal, no adnexal masses or tenderness, no cervical motion tenderness, rectovaginal septum normal and uterus normal size, shape, and consistency but mild TTP  Extremities: extremities normal, atraumatic, no cyanosis or edema  Skin: Skin color, texture, turgor normal. No rashes or lesions  Neurologic: Grossly normal    Data Review: Op note reviewed      Assessment/Plan:  1. Chronic pelvic pain in female  - US OB/GYN Procedure ([facility]) - Extended List; Future  -Stool softener BID    2. Endometriosis  - US OB/GYN Procedure ([facility]) - Extended List; Future    Discussed Lupron, Pelvic PT, and definitive surgery options. Will follow up ultrasound.  "

## 2019-01-04 NOTE — TELEPHONE ENCOUNTER
Elizabeth desire refill of Maxalt. Appointment scheduled today  Patient Active Problem List   Diagnosis    Migraine headache    Bronchitis    PFO (patent foramen ovale)    H/O sinus tachycardia    Menorrhagia with irregular cycle    Unwanted fertility    Pelvic pain in female    Dysmenorrhea    Endometriosis    Encounter for removal and reinsertion of IUD     Prior to Admission medications    Medication Sig Start Date End Date Taking? Authorizing Provider   levonorgestrel (MIRENA) 20 mcg/24 hr (5 years) IUD 1 intra uterine device by intrauterine route once 4/25/18 4/25/19  Olivia Aguillon MD   propranolol (INDERAL LA) 80 MG 24 hr capsule Take 80 mg by mouth once daily. 2/19/18   Historical Provider, MD   propranolol (INDERAL LA) 80 MG 24 hr capsule Take one capsule by mouth once a day 10/23/18      rizatriptan (MAXALT) 5 MG tablet Take one daily as needed for headache. May repeat dose once after 2 hours if needed 10/22/18   Yrn Billings MD   topiramate (TOPAMAX) 50 MG tablet Take 1 tablet (50 mg total) by mouth 2 (two) times daily. 5/7/18 5/7/19  Scar Bustos MD

## 2019-01-08 ENCOUNTER — PATIENT MESSAGE (OUTPATIENT)
Dept: OBSTETRICS AND GYNECOLOGY | Facility: CLINIC | Age: 24
End: 2019-01-08

## 2019-01-14 ENCOUNTER — PATIENT MESSAGE (OUTPATIENT)
Dept: OBSTETRICS AND GYNECOLOGY | Facility: CLINIC | Age: 24
End: 2019-01-14

## 2019-01-14 ENCOUNTER — TELEPHONE (OUTPATIENT)
Dept: OBSTETRICS AND GYNECOLOGY | Facility: CLINIC | Age: 24
End: 2019-01-14

## 2019-01-14 DIAGNOSIS — Z98.890 HISTORY OF ENDOMETRIAL ABLATION: ICD-10-CM

## 2019-01-14 DIAGNOSIS — R10.2 CHRONIC PELVIC PAIN IN FEMALE: Primary | ICD-10-CM

## 2019-01-14 DIAGNOSIS — N83.209 CYST OF OVARY, UNSPECIFIED LATERALITY: ICD-10-CM

## 2019-01-14 DIAGNOSIS — N80.9 ENDOMETRIOSIS: ICD-10-CM

## 2019-01-14 DIAGNOSIS — G89.29 CHRONIC PELVIC PAIN IN FEMALE: Primary | ICD-10-CM

## 2019-01-14 NOTE — TELEPHONE ENCOUNTER
[facility] BS scheduled for 2/1/19 per case request number [id number].  Preop and preadmit appt's scheduled and discussed w/ pt via telephone.  Juliana in surgery notified of date/time.

## 2019-01-17 ENCOUNTER — PATIENT MESSAGE (OUTPATIENT)
Dept: OBSTETRICS AND GYNECOLOGY | Facility: CLINIC | Age: 24
End: 2019-01-17

## 2019-01-25 ENCOUNTER — PATIENT MESSAGE (OUTPATIENT)
Dept: OBSTETRICS AND GYNECOLOGY | Facility: CLINIC | Age: 24
End: 2019-01-25

## 2019-01-25 NOTE — TELEPHONE ENCOUNTER
Pt requested to cancel [facility] BLS scheduled for 2/1/19.  Scheduled for IUD removal on 2/25/19.  Juliana in surgery and Dr. Billings notified.

## 2019-02-07 ENCOUNTER — OFFICE VISIT (OUTPATIENT)
Dept: NEUROLOGY | Facility: CLINIC | Age: 24
End: 2019-02-07
Payer: COMMERCIAL

## 2019-02-07 VITALS
BODY MASS INDEX: 32.91 KG/M2 | WEIGHT: 209.69 LBS | SYSTOLIC BLOOD PRESSURE: 122 MMHG | HEIGHT: 67 IN | DIASTOLIC BLOOD PRESSURE: 80 MMHG | RESPIRATION RATE: 18 BRPM | HEART RATE: 80 BPM

## 2019-02-07 DIAGNOSIS — G43.019 INTRACTABLE MIGRAINE WITHOUT AURA AND WITHOUT STATUS MIGRAINOSUS: Primary | ICD-10-CM

## 2019-02-07 PROCEDURE — 99999 PR PBB SHADOW E&M-EST. PATIENT-LVL III: ICD-10-PCS | Mod: PBBFAC,,, | Performed by: PSYCHIATRY & NEUROLOGY

## 2019-02-07 PROCEDURE — 99213 OFFICE O/P EST LOW 20 MIN: CPT | Mod: S$GLB,,, | Performed by: PSYCHIATRY & NEUROLOGY

## 2019-02-07 PROCEDURE — 3008F BODY MASS INDEX DOCD: CPT | Mod: CPTII,S$GLB,, | Performed by: PSYCHIATRY & NEUROLOGY

## 2019-02-07 PROCEDURE — 3008F PR BODY MASS INDEX (BMI) DOCUMENTED: ICD-10-PCS | Mod: CPTII,S$GLB,, | Performed by: PSYCHIATRY & NEUROLOGY

## 2019-02-07 PROCEDURE — 99999 PR PBB SHADOW E&M-EST. PATIENT-LVL III: CPT | Mod: PBBFAC,,, | Performed by: PSYCHIATRY & NEUROLOGY

## 2019-02-07 PROCEDURE — 99213 PR OFFICE/OUTPT VISIT, EST, LEVL III, 20-29 MIN: ICD-10-PCS | Mod: S$GLB,,, | Performed by: PSYCHIATRY & NEUROLOGY

## 2019-02-07 RX ORDER — PROCHLORPERAZINE MALEATE 10 MG
10 TABLET ORAL DAILY PRN
Qty: 10 TABLET | Refills: 1 | Status: SHIPPED | OUTPATIENT
Start: 2019-02-07 | End: 2019-09-27

## 2019-02-07 RX ORDER — TOPIRAMATE 50 MG/1
50 TABLET, FILM COATED ORAL 2 TIMES DAILY
Qty: 180 TABLET | Refills: 3 | Status: SHIPPED | OUTPATIENT
Start: 2019-02-07 | End: 2019-03-15

## 2019-02-07 NOTE — PROGRESS NOTES
"HPI:  Elizabeth Piña is a 23 y.o. female with an episode in early 2012 consistent Vasovagal syncope. Near consulvive syncope at that time with "grunting and eyes rolling back" also noted, likely due to maintained upright position. Work up revealed only Small PFO noted on bubble study but cardiology agreed this is likely vasovagal in nature  No AED needed. History of migraine with aura.    Since the last visit, the patient tried Topamax and this is helping her headaches greatly. She is now only having 4 headache per month, which is down from 3 spells per week. These are sometimes severe but do respond to Maxalt PRN on most occasion.       The patient was recommended to have a hysterectomy but cancelled this due to some conflicts. She is planning on doing this again.     Review of Systems   Constitutional: Negative for fever.   HENT: Negative for nosebleeds.    Eyes: Negative for double vision.   Respiratory: Negative for shortness of breath.    Cardiovascular: Negative for leg swelling.   Gastrointestinal: Negative for blood in stool.   Genitourinary: Negative for hematuria.   Musculoskeletal: Negative for neck pain.   Skin: Negative for rash.   Neurological: Positive for headaches. Negative for sensory change.   Psychiatric/Behavioral: The patient does not have insomnia.        Exam:   Gen Appearance, well developed/nourished in no apparent distress  CV: 2+ distal pulses with no edema or swelling  Neuro:  MS: Awake, alert, Sustains attention. Recent/remote memory intact, Language is full to spontaneous speech/comprehension. Fund of Knowledge is full  CN: Optic discs are flat with normal vasculature, PERRL, Extraoccular movements and visual fields are full. Normal facial sensation and strength, Tongue and Palate are midline and strong. Shoulder Shrug symmetric and strong.  Motor: Normal bulk, tone, no abnormal movements. No protonator drift and 5/5 bilateral UE/LE strength  Sensory: . Romberg negative and temp and " "vibration intact  Cerebellar: Finger-nose, Heal and shin intact  Gait: Normal stance, no ataxia      Imaging:3/28/12 MRI of the brain reports and images reviewed: NORMAL MRI OF THE BRAIN.  2/6/12 EEG report reviewed from [facility]: Normal    Assessment/Recommendation: Elizabeth Piña is a 23 y.o. female with an episode in early 2012 consistent Vasovagal syncope. Near consulvive syncope at that time with "grunting and eyes rolling back" also noted, likely due to maintained upright position. Work up revealed only Small PFO noted on bubble study but cardiology agreed this is likely vasovagal in nature  No AED needed. History of migraine with aura.  I recommend:     1. For migraine PRN:   Maxalt PRN-- helps most of the time. Imitrex not effective. Can try compazine for rescue unless sedation or abnormal movements.   2. Propranolol is being used due to palpitations per Cardiologist/ no syncope/ PFO likely incidentally but is not fully helping her headaches right now   3.  Elavil caused too much sedation  4.  Topamax is helping well to prevent headaches at this time. No need for further treatment or imaging. Botox or CGRP antagonist could be considered next if needed.     RTC 6months          "

## 2019-02-25 ENCOUNTER — OFFICE VISIT (OUTPATIENT)
Dept: OBSTETRICS AND GYNECOLOGY | Facility: CLINIC | Age: 24
End: 2019-02-25
Payer: COMMERCIAL

## 2019-02-25 VITALS
SYSTOLIC BLOOD PRESSURE: 120 MMHG | HEART RATE: 72 BPM | WEIGHT: 208 LBS | HEIGHT: 67 IN | RESPIRATION RATE: 10 BRPM | DIASTOLIC BLOOD PRESSURE: 80 MMHG | BODY MASS INDEX: 32.65 KG/M2

## 2019-02-25 DIAGNOSIS — Z30.432 ENCOUNTER FOR IUD REMOVAL: Primary | ICD-10-CM

## 2019-02-25 DIAGNOSIS — Z30.011 ENCOUNTER FOR INITIAL PRESCRIPTION OF CONTRACEPTIVE PILLS: ICD-10-CM

## 2019-02-25 PROCEDURE — 99999 PR PBB SHADOW E&M-EST. PATIENT-LVL III: ICD-10-PCS | Mod: PBBFAC,,, | Performed by: OBSTETRICS & GYNECOLOGY

## 2019-02-25 PROCEDURE — 58301 PR REMOVE, INTRAUTERINE DEVICE: ICD-10-PCS | Mod: S$GLB,,, | Performed by: OBSTETRICS & GYNECOLOGY

## 2019-02-25 PROCEDURE — 99499 NO LOS: ICD-10-PCS | Mod: S$GLB,,, | Performed by: OBSTETRICS & GYNECOLOGY

## 2019-02-25 PROCEDURE — 58301 REMOVE INTRAUTERINE DEVICE: CPT | Mod: S$GLB,,, | Performed by: OBSTETRICS & GYNECOLOGY

## 2019-02-25 PROCEDURE — 99499 UNLISTED E&M SERVICE: CPT | Mod: S$GLB,,, | Performed by: OBSTETRICS & GYNECOLOGY

## 2019-02-25 PROCEDURE — 99999 PR PBB SHADOW E&M-EST. PATIENT-LVL III: CPT | Mod: PBBFAC,,, | Performed by: OBSTETRICS & GYNECOLOGY

## 2019-02-25 RX ORDER — NORETHINDRONE ACETATE AND ETHINYL ESTRADIOL .02; 1 MG/1; MG/1
1 TABLET ORAL DAILY
Qty: 28 TABLET | Refills: 11 | Status: SHIPPED | OUTPATIENT
Start: 2019-02-25 | End: 2019-07-23

## 2019-02-25 NOTE — PROGRESS NOTES
Subjective:    Patient ID: Elizabeth Piña is a 23 y.o. y.o. female.     Chief Complaint:   Chief Complaint   Patient presents with    Contraception     IUD removal and discuss other options        History of Present Illness:   Elizabeth is a 23 y.o.  female  who presents for IUD removal. No LMP recorded. Patient has had an implant. Patient is not currently having periods (Reason: Birth Control). She desires removal of a Mirena IUD.  Would like to start OCPs.  Discussed imperative to be on birth control since she has h/o ablation.      EXAM:  Cervix: IUD string visible.. No lesions or discharge.  Uterine Position: anteroflexed    PROCEDURE:  TIME OUT PERFORMED.  The cervix visualized with a speculum.  The IUD string was visualized and grasped with a sponge forceps. With gently traction the IUD was removed without difficulty.    ASSESSMENT:  1. Contraception management / IUD removal      POST IUD REMOVAL COUNSELING:  Manage post IUD removal pain with NSAIDs, Tylenol or Rx given.  Report heavy bleeding, fever, continued pain.  Use alternative birth control until one normal menstrual cycle.  OCP eRx sent to pharmacy    Counseling lasted approximately 15 minutes and all her questions were answered.

## 2019-03-14 ENCOUNTER — PATIENT MESSAGE (OUTPATIENT)
Dept: OBSTETRICS AND GYNECOLOGY | Facility: CLINIC | Age: 24
End: 2019-03-14

## 2019-03-14 NOTE — TELEPHONE ENCOUNTER
Patient states that the pharmacy advised her that her OCP(Junel) is not effective while taking the Topamax. Requesting alternative be sent to pharmacy. Please advise.

## 2019-03-15 ENCOUNTER — PATIENT MESSAGE (OUTPATIENT)
Dept: NEUROLOGY | Facility: CLINIC | Age: 24
End: 2019-03-15

## 2019-03-15 ENCOUNTER — TELEPHONE (OUTPATIENT)
Dept: NEUROLOGY | Facility: CLINIC | Age: 24
End: 2019-03-15

## 2019-03-15 RX ORDER — ZONISAMIDE 25 MG/1
CAPSULE ORAL
Qty: 90 CAPSULE | Refills: 11 | Status: SHIPPED | OUTPATIENT
Start: 2019-03-15 | End: 2019-07-23

## 2019-03-18 ENCOUNTER — PATIENT MESSAGE (OUTPATIENT)
Dept: OBSTETRICS AND GYNECOLOGY | Facility: CLINIC | Age: 24
End: 2019-03-18

## 2019-03-18 NOTE — TELEPHONE ENCOUNTER
Pt calling with c/o worsening pelvic and lower back pain with some nausea. Pt states started around 7am, has taken Aleve and it help some. Pt states unsure if its another cyst that ruptured or not. Reports UA done at work was negative. Still interested in the hysterectomy that was discussed but unsure when she can do it d/t starting new job.

## 2019-03-26 RX ORDER — RIZATRIPTAN BENZOATE 5 MG/1
TABLET ORAL
Qty: 9 TABLET | Refills: 0 | Status: SHIPPED | OUTPATIENT
Start: 2019-03-26 | End: 2019-07-17 | Stop reason: SDUPTHER

## 2019-03-26 NOTE — TELEPHONE ENCOUNTER
Refill request received from [facility] pharmacy in [place] for refill on Maxalt. Last office visit 2/25/19.

## 2019-04-12 ENCOUNTER — OFFICE VISIT (OUTPATIENT)
Dept: INTERNAL MEDICINE | Facility: CLINIC | Age: 24
End: 2019-04-12
Payer: COMMERCIAL

## 2019-04-12 VITALS
HEART RATE: 90 BPM | HEIGHT: 67 IN | OXYGEN SATURATION: 97 % | SYSTOLIC BLOOD PRESSURE: 121 MMHG | DIASTOLIC BLOOD PRESSURE: 78 MMHG | WEIGHT: 215.63 LBS | BODY MASS INDEX: 33.84 KG/M2 | TEMPERATURE: 99 F

## 2019-04-12 DIAGNOSIS — J40 BRONCHITIS: Primary | ICD-10-CM

## 2019-04-12 DIAGNOSIS — Z91.09 ENVIRONMENTAL ALLERGIES: ICD-10-CM

## 2019-04-12 PROCEDURE — 99999 PR PBB SHADOW E&M-EST. PATIENT-LVL IV: CPT | Mod: PBBFAC,,, | Performed by: NURSE PRACTITIONER

## 2019-04-12 PROCEDURE — 3008F BODY MASS INDEX DOCD: CPT | Mod: CPTII,S$GLB,, | Performed by: NURSE PRACTITIONER

## 2019-04-12 PROCEDURE — 99999 PR PBB SHADOW E&M-EST. PATIENT-LVL IV: ICD-10-PCS | Mod: PBBFAC,,, | Performed by: NURSE PRACTITIONER

## 2019-04-12 PROCEDURE — 99214 OFFICE O/P EST MOD 30 MIN: CPT | Mod: S$GLB,,, | Performed by: NURSE PRACTITIONER

## 2019-04-12 PROCEDURE — 99214 PR OFFICE/OUTPT VISIT, EST, LEVL IV, 30-39 MIN: ICD-10-PCS | Mod: S$GLB,,, | Performed by: NURSE PRACTITIONER

## 2019-04-12 PROCEDURE — 3008F PR BODY MASS INDEX (BMI) DOCUMENTED: ICD-10-PCS | Mod: CPTII,S$GLB,, | Performed by: NURSE PRACTITIONER

## 2019-04-12 RX ORDER — FLUTICASONE PROPIONATE 50 MCG
1 SPRAY, SUSPENSION (ML) NASAL 2 TIMES DAILY
Qty: 16 G | Refills: 12 | Status: SHIPPED | OUTPATIENT
Start: 2019-04-12 | End: 2019-05-12

## 2019-04-12 RX ORDER — ALBUTEROL SULFATE 90 UG/1
2 AEROSOL, METERED RESPIRATORY (INHALATION) EVERY 4 HOURS PRN
Qty: 8.5 G | Refills: 11 | Status: SHIPPED | OUTPATIENT
Start: 2019-04-12 | End: 2019-06-14

## 2019-04-12 RX ORDER — MONTELUKAST SODIUM 10 MG/1
10 TABLET ORAL DAILY
Qty: 30 TABLET | Refills: 12 | Status: SHIPPED | OUTPATIENT
Start: 2019-04-12 | End: 2019-06-30

## 2019-04-12 NOTE — PATIENT INSTRUCTIONS
Take Claritin/ Allegra or Zyrtec daily to help block histamine release.  You will need to alternate every few months .    Start Singulair ( montelukast) 10 mg once a day.  This medicine blocks leukotriene release which is an inflammatory substance your body produces in response to allergies    Use your inhaler, albuterol 2 puffs every 4-6 hours with the spacer if you are coughing, wheezing or if your chest feels tight

## 2019-04-12 NOTE — PROGRESS NOTES
Subjective:       Patient ID: Elizabeth Piña is a 23 y.o. female.    Chief Complaint: Cough and Nasal Congestion    Disclaimer: This note has been generated using voice-recognition software. There may be typographical errors that have been missed during proof-reading  Pt of Dr Gottlieb here for same-day appointment complaining of cough 2 weeks.  Patient denies nausea vomiting diarrhea fever chills or ear pain. Patient has been taking Robitussin Mucinex and DayQuil.    Cough   Associated symptoms include postnasal drip and rhinorrhea. Pertinent negatives include no chest pain, fever, headaches, rash or sore throat.     Review of Systems   Constitutional: Negative for activity change, appetite change, fatigue and fever.   HENT: Positive for postnasal drip and rhinorrhea. Negative for congestion, drooling, sinus pressure, sinus pain, sneezing, sore throat, tinnitus, trouble swallowing and voice change.    Respiratory: Positive for cough. Negative for chest tightness.    Cardiovascular: Negative for chest pain, palpitations and leg swelling.   Gastrointestinal: Negative for abdominal pain, diarrhea, nausea and vomiting.   Genitourinary: Negative for difficulty urinating.   Skin: Negative for rash.   Neurological: Negative for dizziness, facial asymmetry and headaches.   Psychiatric/Behavioral: Negative for sleep disturbance.         Past Medical History:   Diagnosis Date    Elevated cholesterol     Heart disorder     hole in heart    Migraine headache     Migraines     Murmur     Tachycardia      Past Surgical History:   Procedure Laterality Date    ABLATION ENDOMETRIAL THERMAL - NOVASURE N/A 6/1/2018    [facility]    DILATION-CURETTAGE OF UTERUS (D AND C) N/A 6/1/2018    [facility] [place]    EXCISION, CYST, OVARY, LAPAROSCOPIC Left 6/1/2018    [facility] [place]    FULGURATION, ENDOMETRIOSIS N/A 6/1/2018    [facility] [place]    LAPAROSCOPY-DIAGNOSTIC N/A 6/1/2018    [facility] [place]    LYSIS, ADHESIONS N/A 6/1/2018    [facility] [place]    PLACEMENT INTRAUTERINE DEVICE (IUD) N/A 6/1/2018    [facility] [place]    REMOVAL, INTRAUTERINE DEVICE N/A 6/1/2018    [facility] [place]    WISDOM TOOTH EXTRACTION  10/2012     Social History     Social History Narrative    ** Merged History Encounter **          Family History   Problem Relation Age of Onset    Diabetes Mother     Hyperlipidemia Mother     Hypertension Father     Hyperlipidemia Father     Cerebral palsy Brother         half brother; dad's side    Heart disease Maternal Grandmother     Diabetes Maternal Grandmother     Heart disease Maternal Grandfather     Kidney disease Maternal Grandfather     Heart disease Paternal Grandmother     Heart disease Paternal Grandfather     Breast cancer Neg Hx     Colon cancer Neg Hx     Ovarian cancer Neg Hx      Outpatient Encounter Medications as of 4/12/2019   Medication Sig Dispense Refill    norethindrone-ethinyl estradiol (JUNEL 1/20, 21,) 1-20 mg-mcg per tablet Take 1 tablet by mouth once daily. 28 tablet 11    prochlorperazine (COMPAZINE) 10 MG tablet Take 1 tablet (10 mg total) by mouth daily as needed. 10 tablet 1    propranolol (INDERAL LA) 80 MG 24 hr capsule TAKE 1 CAPSULE BY MOUTH DAILY 90 capsule 4    rizatriptan (MAXALT) 5 MG tablet Take one daily as needed for headache. May repeat dose once after 2 hours if needed. 9 tablet 0    zonisamide (ZONEGRAN) 25 MG Cap Take one at night for one week, then 2 at night for 2 weeks, then 3 at night. 90 capsule 11    albuterol (PROVENTIL/VENTOLIN HFA) 90 mcg/actuation inhaler Inhale 2 puffs into the lungs every 4 (four) hours as needed for Wheezing. Use with spacer 1 Inhaler 11    fluticasone (FLONASE) 50 mcg/actuation nasal spray 1 spray (50 mcg total) by Each  "Nare route 2 (two) times daily. 1 g 12    inhalation spacing device Use with inhaler dispense with mouthpiece 1 Device 1    montelukast (SINGULAIR) 10 mg tablet Take 1 tablet (10 mg total) by mouth once daily. 30 tablet 12    predniSONE (DELTASONE) 20 MG tablet 3 tablets PO qday x 1 day, then 2 tablets PO qday x 5 days 13 tablet 0     No facility-administered encounter medications on file as of 4/12/2019.      Last 3 sets of Vitals  Vitals - 1 value per visit 2/7/2019 2/25/2019 4/12/2019   SYSTOLIC 122 120 121   DIASTOLIC 80 80 78   PULSE 80 72 90   TEMPERATURE - - 99.1   RESPIRATIONS 18 10 -   SPO2 - - 97   Weight (lb) 209.66 208 215.61   Weight (kg) 95.1 94.348 97.8   HEIGHT 5' 7" 5' 7" 5' 7"   BODY MASS INDEX 32.84 32.58 33.77   VISIT REPORT - - -   Pain Score  0 0 0         Objective:      Physical Exam   Constitutional: She is oriented to person, place, and time. She appears well-developed and well-nourished. No distress.   24 yo female non toxic appearing     HENT:   Head: Normocephalic and atraumatic.   Right Ear: Hearing and external ear normal. A foreign body is present.   Left Ear: Hearing, external ear and ear canal normal. A middle ear effusion is present.   Nose: Mucosal edema and rhinorrhea present.   Mouth/Throat: Uvula is midline, oropharynx is clear and moist and mucous membranes are normal. No oropharyngeal exudate. No tonsillar exudate.   Right ear with cerumen at opening     Eyes: Pupils are equal, round, and reactive to light. Conjunctivae are normal.   servando allergic shiners noted     Neck: Normal range of motion. Neck supple.   Cardiovascular: Normal rate, regular rhythm, normal heart sounds and intact distal pulses.   Pulmonary/Chest: She has no wheezes. She has no rales.   reactive cough    BS clear throughout but mildly dim throughout     Abdominal: Soft.   Lymphadenopathy:     She has no cervical adenopathy.   Neurological: She is alert and oriented to person, place, and time.   Skin: " Skin is warm and dry. Capillary refill takes less than 2 seconds. No rash noted. She is not diaphoretic. No erythema. No pallor.   Psychiatric: She has a normal mood and affect. Her behavior is normal. Judgment and thought content normal.   Nursing note and vitals reviewed.          Lab Results   Component Value Date    WBC 9.42 05/28/2018    RBC 4.46 05/28/2018    HGB 12.7 05/28/2018    HCT 37.9 05/28/2018    MCV 85 05/28/2018    MCH 28.5 05/28/2018    MCHC 33.5 05/28/2018    RDW 12.9 05/28/2018     05/28/2018    MPV 10.0 05/28/2018    GRAN 4.9 05/28/2018    GRAN 51.9 05/28/2018    LYMPH 3.8 05/28/2018    LYMPH 40.3 05/28/2018    MONO 0.6 05/28/2018    MONO 6.8 05/28/2018    EOS 0.1 05/28/2018    BASO 0.03 05/28/2018    EOSINOPHIL 0.7 05/28/2018    BASOPHIL 0.3 05/28/2018     Lab Results   Component Value Date    WBC 9.42 05/28/2018    HGB 12.7 05/28/2018    HCT 37.9 05/28/2018     05/28/2018    ALT 20 05/28/2018    AST 18 05/28/2018     05/28/2018    K 3.8 05/28/2018     05/28/2018    CREATININE 0.9 05/28/2018    BUN 17 05/28/2018    CO2 26 05/28/2018    TSH 2.114 05/28/2018       Assessment:       1. Bronchitis    2. Environmental allergies        Plan:           Elizabeth was seen today for cough and nasal congestion.    Diagnoses and all orders for this visit:    Bronchitis  -     albuterol (PROVENTIL/VENTOLIN HFA) 90 mcg/actuation inhaler; Inhale 2 puffs into the lungs every 4 (four) hours as needed for Wheezing. Use with spacer  -     inhalation spacing device; Use with inhaler dispense with mouthpiece  -     montelukast (SINGULAIR) 10 mg tablet; Take 1 tablet (10 mg total) by mouth once daily.    Environmental allergies  -     fluticasone (FLONASE) 50 mcg/actuation nasal spray; 1 spray (50 mcg total) by Each Nare route 2 (two) times daily.  -     montelukast (SINGULAIR) 10 mg tablet; Take 1 tablet (10 mg total) by mouth once daily.      Patient Instructions       Take Claritin/ Allegra  or Zyrtec daily to help block histamine release.  You will need to alternate every few months .    Start Singulair ( montelukast) 10 mg once a day.  This medicine blocks leukotriene release which is an inflammatory substance your body produces in response to allergies    Use your inhaler, albuterol 2 puffs every 4-6 hours with the spacer if you are coughing, wheezing or if your chest feels tight

## 2019-04-22 ENCOUNTER — HOSPITAL ENCOUNTER (EMERGENCY)
Facility: HOSPITAL | Age: 24
Discharge: HOME | End: 2019-04-22
Attending: EMERGENCY MEDICINE
Payer: COMMERCIAL

## 2019-04-22 VITALS
HEART RATE: 98 BPM | TEMPERATURE: 98 F | OXYGEN SATURATION: 100 % | WEIGHT: 209 LBS | DIASTOLIC BLOOD PRESSURE: 82 MMHG | SYSTOLIC BLOOD PRESSURE: 129 MMHG | BODY MASS INDEX: 32.8 KG/M2 | RESPIRATION RATE: 18 BRPM | HEIGHT: 67 IN

## 2019-04-22 DIAGNOSIS — R06.02 SHORTNESS OF BREATH: ICD-10-CM

## 2019-04-22 DIAGNOSIS — R00.2 PALPITATIONS: Primary | ICD-10-CM

## 2019-04-22 LAB
ANION GAP SERPL CALC-SCNC: 8 MMOL/L (ref 8–16)
B-HCG UR QL: NEGATIVE
BASOPHILS # BLD AUTO: 0.01 K/UL (ref 0–0.2)
BASOPHILS NFR BLD: 0.1 % (ref 0–1.9)
BILIRUB UR QL STRIP: NEGATIVE
BUN SERPL-MCNC: 12 MG/DL (ref 6–20)
CALCIUM SERPL-MCNC: 9.7 MG/DL (ref 8.7–10.5)
CHLORIDE SERPL-SCNC: 109 MMOL/L (ref 95–110)
CLARITY UR REFRACT.AUTO: CLEAR
CO2 SERPL-SCNC: 24 MMOL/L (ref 23–29)
COLOR UR AUTO: NORMAL
CREAT SERPL-MCNC: 0.8 MG/DL (ref 0.5–1.4)
CTP QC/QA: YES
DIFFERENTIAL METHOD: NORMAL
EOSINOPHIL # BLD AUTO: 0.1 K/UL (ref 0–0.5)
EOSINOPHIL NFR BLD: 1.3 % (ref 0–8)
ERYTHROCYTE [DISTWIDTH] IN BLOOD BY AUTOMATED COUNT: 12.3 % (ref 11.5–14.5)
EST. GFR  (AFRICAN AMERICAN): >60 ML/MIN/1.73 M^2
EST. GFR  (NON AFRICAN AMERICAN): >60 ML/MIN/1.73 M^2
GLUCOSE SERPL-MCNC: 106 MG/DL (ref 70–110)
GLUCOSE UR QL STRIP: NEGATIVE
HCT VFR BLD AUTO: 42.1 % (ref 37–48.5)
HGB BLD-MCNC: 13.7 G/DL (ref 12–16)
HGB UR QL STRIP: NEGATIVE
IMM GRANULOCYTES # BLD AUTO: 0.02 K/UL (ref 0–0.04)
IMM GRANULOCYTES NFR BLD AUTO: 0.3 % (ref 0–0.5)
KETONES UR QL STRIP: NEGATIVE
LEUKOCYTE ESTERASE UR QL STRIP: NEGATIVE
LYMPHOCYTES # BLD AUTO: 2.5 K/UL (ref 1–4.8)
LYMPHOCYTES NFR BLD: 35.9 % (ref 18–48)
MAGNESIUM SERPL-MCNC: 2.1 MG/DL (ref 1.6–2.6)
MCH RBC QN AUTO: 28.1 PG (ref 27–31)
MCHC RBC AUTO-ENTMCNC: 32.5 G/DL (ref 32–36)
MCV RBC AUTO: 86 FL (ref 82–98)
MONOCYTES # BLD AUTO: 0.5 K/UL (ref 0.3–1)
MONOCYTES NFR BLD: 7.2 % (ref 4–15)
NEUTROPHILS # BLD AUTO: 3.8 K/UL (ref 1.8–7.7)
NEUTROPHILS NFR BLD: 55.2 % (ref 38–73)
NITRITE UR QL STRIP: NEGATIVE
NRBC BLD-RTO: 0 /100 WBC
PH UR STRIP: 8 [PH] (ref 5–8)
PLATELET # BLD AUTO: 233 K/UL (ref 150–350)
PMV BLD AUTO: 10.3 FL (ref 9.2–12.9)
POTASSIUM SERPL-SCNC: 3.4 MMOL/L (ref 3.5–5.1)
PROT UR QL STRIP: NEGATIVE
RBC # BLD AUTO: 4.88 M/UL (ref 4–5.4)
SODIUM SERPL-SCNC: 141 MMOL/L (ref 136–145)
SP GR UR STRIP: 1 (ref 1–1.03)
TSH SERPL DL<=0.005 MIU/L-ACNC: 1.8 UIU/ML (ref 0.4–4)
URN SPEC COLLECT METH UR: NORMAL
WBC # BLD AUTO: 6.85 K/UL (ref 3.9–12.7)

## 2019-04-22 PROCEDURE — 93005 ELECTROCARDIOGRAM TRACING: CPT

## 2019-04-22 PROCEDURE — 99285 EMERGENCY DEPT VISIT HI MDM: CPT | Mod: ,,, | Performed by: EMERGENCY MEDICINE

## 2019-04-22 PROCEDURE — 93010 EKG 12-LEAD: ICD-10-PCS | Mod: ,,, | Performed by: INTERNAL MEDICINE

## 2019-04-22 PROCEDURE — 99285 PR EMERGENCY DEPT VISIT,LEVEL V: ICD-10-PCS | Mod: ,,, | Performed by: EMERGENCY MEDICINE

## 2019-04-22 PROCEDURE — 93010 ELECTROCARDIOGRAM REPORT: CPT | Mod: ,,, | Performed by: INTERNAL MEDICINE

## 2019-04-22 PROCEDURE — 81003 URINALYSIS AUTO W/O SCOPE: CPT

## 2019-04-22 PROCEDURE — 83735 ASSAY OF MAGNESIUM: CPT

## 2019-04-22 PROCEDURE — 80048 BASIC METABOLIC PNL TOTAL CA: CPT

## 2019-04-22 PROCEDURE — 84443 ASSAY THYROID STIM HORMONE: CPT

## 2019-04-22 PROCEDURE — 85025 COMPLETE CBC W/AUTO DIFF WBC: CPT

## 2019-04-22 PROCEDURE — 99285 EMERGENCY DEPT VISIT HI MDM: CPT

## 2019-04-22 PROCEDURE — 81025 URINE PREGNANCY TEST: CPT | Performed by: EMERGENCY MEDICINE

## 2019-04-22 NOTE — ED PROVIDER NOTES
Encounter Date: 4/22/2019    SCRIBE #1 NOTE: I, Hollie Watson, am scribing for, and in the presence of,  Dr. Logan. I have scribed the following portions of the note - Other sections scribed: HPI, ROS, PE .       History     Chief Complaint   Patient presents with    Palpitations     feels like heart is pounding, lot of diarrhea on friday and once yest     Time patient was seen by the provider: 5:56 PM      The patient is a 23 y.o. female with co-morbidities including: HLD who presents to the ED with a complaint of palpitations, SOB, and hypertension for 3 hours. She reports a 12 year history of palpitations, but was noted to be hypertensive at the onset of her symptoms prompting her ED visit. She denies history of anxiety or panic attacks, and did not feel nervous/anxious prior to the onset of her symptoms. Associated SOB.  She endorses diarrhea, nausea, cough, and dysuria (in the context of Endometriosis) but denies emesis, fever, CP, abd pain, recent travel, or sick contacts. She reports compliance with Propanolol. The patients available PMH, PSH, Social History, medications, allergies, and triage vital signs were reviewed just prior to their medical evaluation. A ten point review of systems was completed and is negative except as documented above.  Patient denies any other acute medical complaint.        The history is provided by the patient and medical records.     Review of patient's allergies indicates:   Allergen Reactions    Doxycycline Other (See Comments)     Yeast infection     Past Medical History:   Diagnosis Date    Elevated cholesterol     Heart disorder     hole in heart    Migraine headache     Migraines     Murmur     Tachycardia      Past Surgical History:   Procedure Laterality Date    ABLATION ENDOMETRIAL THERMAL - NOVASURE N/A 6/1/2018    [facility] [place]    DILATION-CURETTAGE OF UTERUS (D AND C) N/A 6/1/2018    [facility] [place]     EXCISION, CYST, OVARY, LAPAROSCOPIC Left 6/1/2018    [facility] [place]    FULGURATION, ENDOMETRIOSIS N/A 6/1/2018    [facility] [place]    LAPAROSCOPY-DIAGNOSTIC N/A 6/1/2018    [facility] [place]    LYSIS, ADHESIONS N/A 6/1/2018    [facility] [place]    PLACEMENT INTRAUTERINE DEVICE (IUD) N/A 6/1/2018    [facility] [place]    REMOVAL, INTRAUTERINE DEVICE N/A 6/1/2018    [facility] [place]    WISDOM TOOTH EXTRACTION  10/2012     Family History   Problem Relation Age of Onset    Diabetes Mother     Hyperlipidemia Mother     Hypertension Father     Hyperlipidemia Father     Cerebral palsy Brother         half brother; dad's side    Heart disease Maternal Grandmother     Diabetes Maternal Grandmother     Heart disease Maternal Grandfather     Kidney disease Maternal Grandfather     Heart disease Paternal Grandmother     Heart disease Paternal Grandfather     Breast cancer Neg Hx     Colon cancer Neg Hx     Ovarian cancer Neg Hx      Social History     Tobacco Use    Smoking status: Never Smoker    Smokeless tobacco: Never Used   Substance Use Topics    Alcohol use: Yes     Comment: occ    Drug use: No     Review of Systems   Constitutional: Negative for fever.   HENT: Negative for sore throat.    Eyes: Negative for visual disturbance.   Respiratory: Positive for cough and shortness of breath.    Cardiovascular: Positive for palpitations. Negative for chest pain.   Gastrointestinal: Positive for diarrhea and nausea. Negative for abdominal pain and vomiting.   Genitourinary: Positive for dysuria.   Musculoskeletal: Negative for neck pain.   Skin: Negative for rash and wound.   Allergic/Immunologic: Negative for immunocompromised state.   Neurological: Negative for syncope.   All other systems reviewed and are negative.      Physical Exam     Initial Vitals  [04/22/19 1701]   BP Pulse Resp Temp SpO2   129/82 98 18 98.1 °F (36.7 °C) 100 %      MAP       --         Physical Exam    Nursing note and vitals reviewed.  Constitutional: She appears well-developed and well-nourished. She is not diaphoretic. No distress.   HENT:   Head: Normocephalic and atraumatic.   Nose: Nose normal.   Eyes: Conjunctivae are normal. Right eye exhibits no discharge. Left eye exhibits no discharge.   Neck: Normal range of motion. Neck supple.   Cardiovascular: Normal rate, regular rhythm, normal heart sounds and intact distal pulses. Exam reveals no gallop and no friction rub.    No murmur heard.  Pulmonary/Chest: Breath sounds normal. No respiratory distress. She has no wheezes. She has no rhonchi. She has no rales.   Abdominal: Soft. She exhibits no distension. There is no tenderness. There is no rebound and no guarding.   Musculoskeletal: Normal range of motion. She exhibits no edema or tenderness.   Neurological: She is alert and oriented to person, place, and time. GCS score is 15. GCS eye subscore is 4. GCS verbal subscore is 5. GCS motor subscore is 6.   Skin: Skin is warm and dry. No rash noted. No erythema.   Psychiatric: She has a normal mood and affect. Her behavior is normal. Judgment and thought content normal.         ED Course   Procedures  Labs Reviewed   BASIC METABOLIC PANEL - Abnormal; Notable for the following components:       Result Value    Potassium 3.4 (*)     All other components within normal limits   CBC W/ AUTO DIFFERENTIAL   TSH   MAGNESIUM   URINALYSIS, REFLEX TO URINE CULTURE    Narrative:     Preferred Collection Type->Urine, Clean Catch   POCT URINE PREGNANCY     EKG Readings: (Independently Interpreted)   Initial Reading: No STEMI. Rhythm: Normal Sinus Rhythm. Heart Rate: 70. Ectopy: No Ectopy. Conduction: Normal. ST Segments: Normal ST Segments.   Nonspecific T wave abnormalities       Imaging Results          X-Ray Chest PA And Lateral (Final result)  Result  time 04/22/19 20:03:45    Final result by Juan Hill MD (04/22/19 20:03:45)                 Impression:      1. No acute cardiopulmonary process, hypoventilatory exam.      Electronically signed by: Juan Hill MD  Date:    04/22/2019  Time:    20:03             Narrative:    EXAMINATION:  XR CHEST PA AND LATERAL    CLINICAL HISTORY:  Shortness of breath    TECHNIQUE:  PA and lateral views of the chest were performed.    COMPARISON:  None    FINDINGS:  The cardiomediastinal silhouette is not enlarged.  There is elevation of the right hemidiaphragm..  There is no pleural effusion.  The trachea is midline.  The lungs are symmetrically expanded bilaterally without evidence of acute parenchymal process. No large focal consolidation seen.  There is no pneumothorax.  The osseous structures are unremarkable.                              X-Rays:   Independently Interpreted Readings:   Other Readings:  Reviewed images, no ACPD    Medical Decision Making:   History:   Old Medical Records: I decided to obtain old medical records.  Clinical Tests:   Lab Tests: Ordered and Reviewed  Radiological Study: Ordered and Reviewed  Medical Tests: Ordered and Reviewed  ED Management:  23-year-old female with a history of palpitations presents with acute exacerbation.  Vitals normal. Physical exam benign.  Labs unremarkable. Chest x-ray unremarkable. EKG without arrhythmia.  Doubt any acute life threat.  Will discharge home with close PCP follow-up.  She will call tomorrow to arrange.  Patient will return to ED for worsening symptoms, inability to eat/drink, fever greater than 100.4, or any other concerns.  Did bedside teaching with return precautions.  All questions answered.  The patient acknowledges understanding.  Gave verbal discharge instructions.            Scribe Attestation:   Scribe #1: I performed the above scribed service and the documentation accurately describes the services I performed. I attest to the accuracy  of the note.               Clinical Impression:       ICD-10-CM ICD-9-CM   1. Palpitations R00.2 785.1   2. Shortness of breath R06.02 786.05         Disposition:   Disposition: Discharged  Condition: Stable                        Manuel Logan MD  04/22/19 [id number]

## 2019-04-22 NOTE — ED NOTES
Pt presents to ER for palpitation x1 day and diarrhea. Pt states diarrhea started on Friday where she was also vomiting. PT states he only had loose stools once today.  Pt denies CP, SOB, dizziness, trauma, chills/fevers. Skin is dry and intacted. PT Aox4. All questions answer. Will continue to monitor.

## 2019-04-23 NOTE — DISCHARGE INSTRUCTIONS
Continue on your current medications.    Our goal in the emergency department is to always give you outstanding care and exceptional service. You may receive a survey by mail or e-mail in the next week regarding your experience in our ED. We would greatly appreciate your completing and returning the survey. Your feedback provides us with a way to recognize our staff who give very good care and it helps us learn how to improve when your experience was below our aspiration of excellence.

## 2019-05-29 ENCOUNTER — OFFICE VISIT (OUTPATIENT)
Dept: FAMILY MEDICINE | Facility: CLINIC | Age: 24
End: 2019-05-29
Payer: COMMERCIAL

## 2019-05-29 VITALS
BODY MASS INDEX: 33.26 KG/M2 | HEART RATE: 98 BPM | WEIGHT: 211.88 LBS | OXYGEN SATURATION: 96 % | TEMPERATURE: 98 F | SYSTOLIC BLOOD PRESSURE: 117 MMHG | DIASTOLIC BLOOD PRESSURE: 79 MMHG | HEIGHT: 67 IN

## 2019-05-29 DIAGNOSIS — J06.9 VIRAL URI WITH COUGH: Primary | ICD-10-CM

## 2019-05-29 PROCEDURE — 96372 THER/PROPH/DIAG INJ SC/IM: CPT | Mod: S$GLB,,, | Performed by: FAMILY MEDICINE

## 2019-05-29 PROCEDURE — 3008F BODY MASS INDEX DOCD: CPT | Mod: CPTII,S$GLB,, | Performed by: FAMILY MEDICINE

## 2019-05-29 PROCEDURE — 3008F PR BODY MASS INDEX (BMI) DOCUMENTED: ICD-10-PCS | Mod: CPTII,S$GLB,, | Performed by: FAMILY MEDICINE

## 2019-05-29 PROCEDURE — 99214 OFFICE O/P EST MOD 30 MIN: CPT | Mod: 25,S$GLB,, | Performed by: FAMILY MEDICINE

## 2019-05-29 PROCEDURE — 99999 PR PBB SHADOW E&M-EST. PATIENT-LVL III: ICD-10-PCS | Mod: PBBFAC,,, | Performed by: FAMILY MEDICINE

## 2019-05-29 PROCEDURE — 99214 PR OFFICE/OUTPT VISIT, EST, LEVL IV, 30-39 MIN: ICD-10-PCS | Mod: 25,S$GLB,, | Performed by: FAMILY MEDICINE

## 2019-05-29 PROCEDURE — 99999 PR PBB SHADOW E&M-EST. PATIENT-LVL III: CPT | Mod: PBBFAC,,, | Performed by: FAMILY MEDICINE

## 2019-05-29 PROCEDURE — 96372 PR INJECTION,THERAP/PROPH/DIAG2ST, IM OR SUBCUT: ICD-10-PCS | Mod: S$GLB,,, | Performed by: FAMILY MEDICINE

## 2019-05-29 RX ORDER — DOCUSATE SODIUM 100 MG/1
CAPSULE, LIQUID FILLED ORAL
COMMUNITY
End: 2019-06-14

## 2019-05-29 RX ORDER — BETAMETHASONE SODIUM PHOSPHATE AND BETAMETHASONE ACETATE 3; 3 MG/ML; MG/ML
9 INJECTION, SUSPENSION INTRA-ARTICULAR; INTRALESIONAL; INTRAMUSCULAR; SOFT TISSUE
Status: COMPLETED | OUTPATIENT
Start: 2019-05-29 | End: 2019-05-29

## 2019-05-29 RX ADMIN — BETAMETHASONE SODIUM PHOSPHATE AND BETAMETHASONE ACETATE 9 MG: 3; 3 INJECTION, SUSPENSION INTRA-ARTICULAR; INTRALESIONAL; INTRAMUSCULAR; SOFT TISSUE at 03:05

## 2019-05-29 NOTE — LETTER
May 29, 2019                   [facility]  Family Medicine  [address] [place] [address]  Phone: [phone]  Fax: [phone]   May 29, 2019     Patient: Elizabeth Piña   YOB: 1995   Date of Visit: 5/29/2019       To Whom it May Concern:    Elizabeth Piña was seen in my clinic on 5/29/2019. She may return to work on 5/30/2019.    Please excuse her from any classes or work missed.    If you have any questions or concerns, please don't hesitate to call.    Sincerely,         Armando Wallace MD

## 2019-05-29 NOTE — PROGRESS NOTES
Subjective:       Patient ID: Elizabeth Piña is a 23 y.o. female.    Chief Complaint: URI    New to me patient here for UC visit.    URI    This is a new problem. The current episode started yesterday. The problem has been gradually worsening. There has been no fever. Associated symptoms include congestion, coughing, headaches (frontal), a plugged ear sensation and a sore throat. Pertinent negatives include no abdominal pain, chest pain, nausea or rash. Associated symptoms comments: NP cough; no PND.     Review of Systems   Constitutional: Negative for fever.   HENT: Positive for congestion and sore throat.    Respiratory: Positive for cough. Negative for shortness of breath.    Cardiovascular: Negative for chest pain.   Gastrointestinal: Negative for abdominal pain and nausea.   Skin: Negative for rash.   Neurological: Positive for headaches (frontal).   All other systems reviewed and are negative.      Objective:      Physical Exam   Constitutional: She appears well-developed. No distress.   HENT:   Right Ear: Tympanic membrane normal. Tympanic membrane is not erythematous.   Left Ear: Tympanic membrane normal. Tympanic membrane is not erythematous.   Nose: Mucosal edema present. Right sinus exhibits no maxillary sinus tenderness and no frontal sinus tenderness. Left sinus exhibits no maxillary sinus tenderness and no frontal sinus tenderness.   Mouth/Throat: Posterior oropharyngeal erythema present.   Neck: Neck supple.   Cardiovascular: Normal rate and regular rhythm.   No murmur heard.  Pulmonary/Chest: Effort normal and breath sounds normal.   Lymphadenopathy:     She has no cervical adenopathy.   Skin: Skin is warm and dry.       Assessment:       1. Viral URI with cough        Plan:       Elizabeth was seen today for uri.    Diagnoses and all orders for this visit:    Viral URI with cough    Other orders  -     betamethasone acetate-betamethasone sodium phosphate injection 9 mg      Patient Instructions    Vitamin C 1,000 mg three times a day.  Push fluids, Get original, behind the counter Sudafed - 30 mg and use as needed for congestion (ear/nose/sinus) up to three times a day.

## 2019-05-29 NOTE — PATIENT INSTRUCTIONS
Vitamin C 1,000 mg three times a day.  Push fluids, Get original, behind the counter Sudafed - 30 mg and use as needed for congestion (ear/nose/sinus) up to three times a day.

## 2019-06-14 ENCOUNTER — OFFICE VISIT (OUTPATIENT)
Dept: OBSTETRICS AND GYNECOLOGY | Facility: CLINIC | Age: 24
End: 2019-06-14
Payer: COMMERCIAL

## 2019-06-14 VITALS
WEIGHT: 214.5 LBS | HEIGHT: 67 IN | BODY MASS INDEX: 33.67 KG/M2 | SYSTOLIC BLOOD PRESSURE: 106 MMHG | DIASTOLIC BLOOD PRESSURE: 74 MMHG

## 2019-06-14 DIAGNOSIS — Z30.9 ENCOUNTER FOR CONTRACEPTIVE MANAGEMENT, UNSPECIFIED TYPE: Primary | ICD-10-CM

## 2019-06-14 PROCEDURE — 99999 PR PBB SHADOW E&M-EST. PATIENT-LVL III: CPT | Mod: PBBFAC,,, | Performed by: OBSTETRICS & GYNECOLOGY

## 2019-06-14 PROCEDURE — 99213 OFFICE O/P EST LOW 20 MIN: CPT | Mod: S$GLB,,, | Performed by: OBSTETRICS & GYNECOLOGY

## 2019-06-14 PROCEDURE — 3008F BODY MASS INDEX DOCD: CPT | Mod: CPTII,S$GLB,, | Performed by: OBSTETRICS & GYNECOLOGY

## 2019-06-14 PROCEDURE — 99999 PR PBB SHADOW E&M-EST. PATIENT-LVL III: ICD-10-PCS | Mod: PBBFAC,,, | Performed by: OBSTETRICS & GYNECOLOGY

## 2019-06-14 PROCEDURE — 99213 PR OFFICE/OUTPT VISIT, EST, LEVL III, 20-29 MIN: ICD-10-PCS | Mod: S$GLB,,, | Performed by: OBSTETRICS & GYNECOLOGY

## 2019-06-14 PROCEDURE — 3008F PR BODY MASS INDEX (BMI) DOCUMENTED: ICD-10-PCS | Mod: CPTII,S$GLB,, | Performed by: OBSTETRICS & GYNECOLOGY

## 2019-06-14 RX ORDER — MAGNESIUM 200 MG
TABLET ORAL
COMMUNITY
End: 2019-07-23

## 2019-06-14 NOTE — PROGRESS NOTES
Gynecology    SUBJECTIVE:     Chief Complaint: Pelvic Pain       History of Present Illness:  23 year old who presents for chronic pelvic pain/endometriosis.  Patient has tried several medications: OCP, IUD, NSAIDs.  She also has had a uterine ablation, diagnostic laparoscopy with fulguration of endometriosis, MARGARITA, left ovarian cystectomy and placement of a mirena IUD.  She is currently using Junel FE continuously which is not helping the pain.  She had been planned for a hysterectomy in January this year, but had to cancel because she couldn't take 6 weeks off for recovery.  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    Pain was always worst with her period.  Now taking medication continuously to avoid menstrual cycle and still has pain between periods.  Pain is pelvic and with the bowel movements.  This has improved with the magnesium oxide that she is taking.  Pain is also with intercourse with deep penetration.  Pain does not keep her at work, but she takes aleve for relief.    Review of Systems:  Review of Systems   Constitutional: Negative for appetite change, fever and unexpected weight change.   Respiratory: Negative for shortness of breath.    Cardiovascular: Negative for chest pain.   Gastrointestinal: Positive for abdominal pain. Negative for nausea and vomiting.   Genitourinary: Positive for dysmenorrhea, dyspareunia and pelvic pain. Negative for menorrhagia, menstrual problem, vaginal bleeding, vaginal discharge and vaginal pain.        OBJECTIVE:     Physical Exam:  Physical Exam   Constitutional: She is oriented to person, place, and time. She appears well-developed and well-nourished.   Pulmonary/Chest: Effort normal.   Neurological: She is oriented to person, place, and time.   Skin: No pallor.   Psychiatric: She has a normal mood and affect. Her behavior is normal. Judgment and thought content normal.   Nursing note and vitals reviewed.      ASSESSMENT:       ICD-10-CM ICD-9-CM    1. Encounter  for contraceptive management, unspecified type Z30.9 V25.9 Device Authorization Order          Plan:      Elizabeth was seen today for pelvic pain.    Diagnoses and all orders for this visit:    Encounter for contraceptive management, unspecified type  -     Device Authorization Order    Other orders  -     etonogestrel (NEXPLANON) 68 mg Impl subdermal device; 68 mg by Subdermal route once. for 1 dose    - long discussion with patient regarding options for treatment of endometriosis; discussed the benefit behind continuous OCP; patient also given information about depo lupron and orlissa; reviewed risk factors and benefits of both; she is interested in orlissa  - will have patient return for nexplanon and orlissa initiation    Orders Placed This Encounter   Procedures    Device Authorization Order       Follow up in about 1 month (around 7/14/2019) for contraception.    Julia Fagan

## 2019-06-17 ENCOUNTER — TELEPHONE (OUTPATIENT)
Dept: OBSTETRICS AND GYNECOLOGY | Facility: CLINIC | Age: 24
End: 2019-06-17

## 2019-06-17 NOTE — TELEPHONE ENCOUNTER
----- Message from Emerald Hawk MA sent at 6/17/2019  4:58 PM CDT -----      ----- Message -----  From: Trevor Dickens  Sent: 6/17/2019   4:42 PM  To: [facility] Staff    PT RETURNING YOUR CALL [phone]

## 2019-06-17 NOTE — TELEPHONE ENCOUNTER
Patient call returned.  Discussed orlissa briefly.  Will wait for nexplanon placement and will prescribe orlissa that day.

## 2019-06-19 ENCOUNTER — PATIENT MESSAGE (OUTPATIENT)
Dept: OBSTETRICS AND GYNECOLOGY | Facility: CLINIC | Age: 24
End: 2019-06-19

## 2019-06-20 ENCOUNTER — TELEPHONE (OUTPATIENT)
Dept: OBSTETRICS AND GYNECOLOGY | Facility: CLINIC | Age: 24
End: 2019-06-20

## 2019-06-20 NOTE — TELEPHONE ENCOUNTER
----- Message from Toña Durán sent at 6/20/2019  9:48 AM CDT -----  Contact: Lynn griggs   Name of Who is Calling: Lynn griggs     What is the request in detail: Lynn griggs is requesting a call back regarding enrollment form for  etonogestrel (NEXPLANON) 68 Please contact to further discuss and advise      Can the clinic reply by [facility]: No     What Number to Call Back if not in [facility]:  [phone]

## 2019-06-20 NOTE — TELEPHONE ENCOUNTER
Calling patient to let her know that she needed to come in and fill out a nexplanon form. ( missing information)

## 2019-06-21 ENCOUNTER — HOSPITAL ENCOUNTER (OUTPATIENT)
Facility: HOSPITAL | Age: 24
Discharge: HOME | End: 2019-06-22
Attending: SURGERY | Admitting: SURGERY
Payer: COMMERCIAL

## 2019-06-21 ENCOUNTER — ANESTHESIA (OUTPATIENT)
Dept: SURGERY | Facility: HOSPITAL | Age: 24
End: 2019-06-21
Payer: COMMERCIAL

## 2019-06-21 ENCOUNTER — TELEPHONE (OUTPATIENT)
Dept: OBSTETRICS AND GYNECOLOGY | Facility: CLINIC | Age: 24
End: 2019-06-21

## 2019-06-21 ENCOUNTER — OFFICE VISIT (OUTPATIENT)
Dept: URGENT CARE | Facility: CLINIC | Age: 24
End: 2019-06-21
Payer: COMMERCIAL

## 2019-06-21 ENCOUNTER — PATIENT MESSAGE (OUTPATIENT)
Dept: OBSTETRICS AND GYNECOLOGY | Facility: CLINIC | Age: 24
End: 2019-06-21

## 2019-06-21 ENCOUNTER — ANESTHESIA EVENT (OUTPATIENT)
Dept: SURGERY | Facility: HOSPITAL | Age: 24
End: 2019-06-21
Payer: COMMERCIAL

## 2019-06-21 VITALS
OXYGEN SATURATION: 100 % | HEIGHT: 67 IN | RESPIRATION RATE: 20 BRPM | BODY MASS INDEX: 33.59 KG/M2 | HEART RATE: 76 BPM | SYSTOLIC BLOOD PRESSURE: 108 MMHG | TEMPERATURE: 99 F | DIASTOLIC BLOOD PRESSURE: 72 MMHG | WEIGHT: 214 LBS

## 2019-06-21 DIAGNOSIS — R10.31 RLQ ABDOMINAL PAIN: Primary | ICD-10-CM

## 2019-06-21 DIAGNOSIS — K35.80 ACUTE APPENDICITIS, UNSPECIFIED ACUTE APPENDICITIS TYPE: ICD-10-CM

## 2019-06-21 DIAGNOSIS — K35.30 ACUTE APPENDICITIS WITH LOCALIZED PERITONITIS, WITHOUT PERFORATION, ABSCESS, OR GANGRENE: Primary | ICD-10-CM

## 2019-06-21 LAB
ALBUMIN SERPL BCP-MCNC: 4 G/DL (ref 3.5–5.2)
ALP SERPL-CCNC: 53 U/L (ref 55–135)
ALT SERPL W/O P-5'-P-CCNC: 25 U/L (ref 10–44)
ANION GAP SERPL CALC-SCNC: 9 MMOL/L (ref 8–16)
AST SERPL-CCNC: 15 U/L (ref 10–40)
B-HCG UR QL: NEGATIVE
BASOPHILS # BLD AUTO: 0.02 K/UL (ref 0–0.2)
BASOPHILS NFR BLD: 0.2 % (ref 0–1.9)
BILIRUB SERPL-MCNC: 0.7 MG/DL (ref 0.1–1)
BILIRUB UR QL STRIP: NEGATIVE
BUN SERPL-MCNC: 13 MG/DL (ref 6–20)
CALCIUM SERPL-MCNC: 9 MG/DL (ref 8.7–10.5)
CHLORIDE SERPL-SCNC: 104 MMOL/L (ref 95–110)
CLARITY UR: CLEAR
CO2 SERPL-SCNC: 27 MMOL/L (ref 23–29)
COLOR UR: YELLOW
CREAT SERPL-MCNC: 0.8 MG/DL (ref 0.5–1.4)
DIFFERENTIAL METHOD: ABNORMAL
EOSINOPHIL # BLD AUTO: 0.1 K/UL (ref 0–0.5)
EOSINOPHIL NFR BLD: 0.9 % (ref 0–8)
ERYTHROCYTE [DISTWIDTH] IN BLOOD BY AUTOMATED COUNT: 12.4 % (ref 11.5–14.5)
EST. GFR  (AFRICAN AMERICAN): >60 ML/MIN/1.73 M^2
EST. GFR  (NON AFRICAN AMERICAN): >60 ML/MIN/1.73 M^2
GLUCOSE SERPL-MCNC: 86 MG/DL (ref 70–110)
GLUCOSE UR QL STRIP: NEGATIVE
HCT VFR BLD AUTO: 36.9 % (ref 37–48.5)
HGB BLD-MCNC: 11.8 G/DL (ref 12–16)
HGB UR QL STRIP: ABNORMAL
KETONES UR QL STRIP: NEGATIVE
LEUKOCYTE ESTERASE UR QL STRIP: NEGATIVE
LIPASE SERPL-CCNC: 23 U/L (ref 4–60)
LYMPHOCYTES # BLD AUTO: 2.5 K/UL (ref 1–4.8)
LYMPHOCYTES NFR BLD: 25.2 % (ref 18–48)
MCH RBC QN AUTO: 28.4 PG (ref 27–31)
MCHC RBC AUTO-ENTMCNC: 32 G/DL (ref 32–36)
MCV RBC AUTO: 89 FL (ref 82–98)
MONOCYTES # BLD AUTO: 0.8 K/UL (ref 0.3–1)
MONOCYTES NFR BLD: 7.4 % (ref 4–15)
NEUTROPHILS # BLD AUTO: 6.7 K/UL (ref 1.8–7.7)
NEUTROPHILS NFR BLD: 66.3 % (ref 38–73)
NITRITE UR QL STRIP: NEGATIVE
PH UR STRIP: 6 [PH] (ref 5–8)
PLATELET # BLD AUTO: 227 K/UL (ref 150–350)
PMV BLD AUTO: 10.2 FL (ref 9.2–12.9)
POTASSIUM SERPL-SCNC: 3.7 MMOL/L (ref 3.5–5.1)
PROT SERPL-MCNC: 7.1 G/DL (ref 6–8.4)
PROT UR QL STRIP: NEGATIVE
RBC # BLD AUTO: 4.16 M/UL (ref 4–5.4)
SODIUM SERPL-SCNC: 140 MMOL/L (ref 136–145)
SP GR UR STRIP: 1.02 (ref 1–1.03)
URN SPEC COLLECT METH UR: ABNORMAL
UROBILINOGEN UR STRIP-ACNC: NEGATIVE EU/DL
WBC # BLD AUTO: 10.07 K/UL (ref 3.9–12.7)

## 2019-06-21 PROCEDURE — 99219 PR INITIAL OBSERVATION CARE,LEVL II: ICD-10-PCS | Mod: 57,,, | Performed by: SURGERY

## 2019-06-21 PROCEDURE — 36000709 HC OR TIME LEV III EA ADD 15 MIN: Performed by: SURGERY

## 2019-06-21 PROCEDURE — 37000008 HC ANESTHESIA 1ST 15 MINUTES: Performed by: SURGERY

## 2019-06-21 PROCEDURE — 00840 ANES IPER PX LOWER ABD NOS: CPT | Mod: QZ,P2 | Performed by: NURSE ANESTHETIST, CERTIFIED REGISTERED

## 2019-06-21 PROCEDURE — 85025 COMPLETE CBC W/AUTO DIFF WBC: CPT

## 2019-06-21 PROCEDURE — 44970 PR LAP,APPENDECTOMY: ICD-10-PCS | Mod: ,,, | Performed by: SURGERY

## 2019-06-21 PROCEDURE — 25000003 PHARM REV CODE 250: Performed by: NURSE PRACTITIONER

## 2019-06-21 PROCEDURE — G0378 HOSPITAL OBSERVATION PER HR: HCPCS

## 2019-06-21 PROCEDURE — 63600175 PHARM REV CODE 636 W HCPCS: Performed by: SURGERY

## 2019-06-21 PROCEDURE — 88304 TISSUE EXAM BY PATHOLOGIST: CPT | Mod: 26,,, | Performed by: PATHOLOGY

## 2019-06-21 PROCEDURE — 27201423 OPTIME MED/SURG SUP & DEVICES STERILE SUPPLY: Performed by: SURGERY

## 2019-06-21 PROCEDURE — 96375 TX/PRO/DX INJ NEW DRUG ADDON: CPT

## 2019-06-21 PROCEDURE — 99499 NO LOS: ICD-10-PCS | Mod: S$GLB,,, | Performed by: PHYSICIAN ASSISTANT

## 2019-06-21 PROCEDURE — 44970 LAPAROSCOPY APPENDECTOMY: CPT | Mod: ,,, | Performed by: SURGERY

## 2019-06-21 PROCEDURE — 25500020 PHARM REV CODE 255: Performed by: SURGERY

## 2019-06-21 PROCEDURE — 99219 PR INITIAL OBSERVATION CARE,LEVL II: CPT | Mod: 57,,, | Performed by: SURGERY

## 2019-06-21 PROCEDURE — 83690 ASSAY OF LIPASE: CPT

## 2019-06-21 PROCEDURE — 36415 COLL VENOUS BLD VENIPUNCTURE: CPT

## 2019-06-21 PROCEDURE — 71000033 HC RECOVERY, INTIAL HOUR: Performed by: SURGERY

## 2019-06-21 PROCEDURE — 88304 TISSUE EXAM BY PATHOLOGIST: CPT | Performed by: PATHOLOGY

## 2019-06-21 PROCEDURE — 96374 THER/PROPH/DIAG INJ IV PUSH: CPT

## 2019-06-21 PROCEDURE — 88304 TISSUE SPECIMEN TO PATHOLOGY - SURGERY: ICD-10-PCS | Mod: 26,,, | Performed by: PATHOLOGY

## 2019-06-21 PROCEDURE — 63600175 PHARM REV CODE 636 W HCPCS: Performed by: NURSE PRACTITIONER

## 2019-06-21 PROCEDURE — 99499 UNLISTED E&M SERVICE: CPT | Mod: S$GLB,,, | Performed by: PHYSICIAN ASSISTANT

## 2019-06-21 PROCEDURE — 94760 N-INVAS EAR/PLS OXIMETRY 1: CPT

## 2019-06-21 PROCEDURE — 63600175 PHARM REV CODE 636 W HCPCS: Performed by: NURSE ANESTHETIST, CERTIFIED REGISTERED

## 2019-06-21 PROCEDURE — 80053 COMPREHEN METABOLIC PANEL: CPT

## 2019-06-21 PROCEDURE — 37000009 HC ANESTHESIA EA ADD 15 MINS: Performed by: SURGERY

## 2019-06-21 PROCEDURE — 99285 EMERGENCY DEPT VISIT HI MDM: CPT | Mod: 25

## 2019-06-21 PROCEDURE — 25000003 PHARM REV CODE 250: Performed by: NURSE ANESTHETIST, CERTIFIED REGISTERED

## 2019-06-21 PROCEDURE — 36000708 HC OR TIME LEV III 1ST 15 MIN: Performed by: SURGERY

## 2019-06-21 PROCEDURE — 81025 URINE PREGNANCY TEST: CPT

## 2019-06-21 PROCEDURE — 25000003 PHARM REV CODE 250: Performed by: SURGERY

## 2019-06-21 PROCEDURE — 96361 HYDRATE IV INFUSION ADD-ON: CPT

## 2019-06-21 PROCEDURE — 81003 URINALYSIS AUTO W/O SCOPE: CPT

## 2019-06-21 RX ORDER — ONDANSETRON 2 MG/ML
4 INJECTION INTRAMUSCULAR; INTRAVENOUS EVERY 8 HOURS PRN
Status: DISCONTINUED | OUTPATIENT
Start: 2019-06-21 | End: 2019-06-22 | Stop reason: HOSPADM

## 2019-06-21 RX ORDER — LIDOCAINE HYDROCHLORIDE 20 MG/ML
INJECTION, SOLUTION EPIDURAL; INFILTRATION; INTRACAUDAL; PERINEURAL
Status: DISCONTINUED | OUTPATIENT
Start: 2019-06-21 | End: 2019-06-21

## 2019-06-21 RX ORDER — NEOSTIGMINE METHYLSULFATE 5 MG/5 ML
SYRINGE (ML) INTRAVENOUS
Status: DISCONTINUED | OUTPATIENT
Start: 2019-06-21 | End: 2019-06-21

## 2019-06-21 RX ORDER — KETOROLAC TROMETHAMINE 30 MG/ML
30 INJECTION, SOLUTION INTRAMUSCULAR; INTRAVENOUS
Status: COMPLETED | OUTPATIENT
Start: 2019-06-21 | End: 2019-06-21

## 2019-06-21 RX ORDER — HYDROCODONE BITARTRATE AND ACETAMINOPHEN 5; 325 MG/1; MG/1
1 TABLET ORAL EVERY 4 HOURS PRN
Status: DISCONTINUED | OUTPATIENT
Start: 2019-06-21 | End: 2019-06-22 | Stop reason: HOSPADM

## 2019-06-21 RX ORDER — FENTANYL CITRATE 50 UG/ML
INJECTION, SOLUTION INTRAMUSCULAR; INTRAVENOUS
Status: DISCONTINUED | OUTPATIENT
Start: 2019-06-21 | End: 2019-06-21

## 2019-06-21 RX ORDER — CEFOXITIN SODIUM 2 G/50ML
2 INJECTION, SOLUTION INTRAVENOUS
Status: COMPLETED | OUTPATIENT
Start: 2019-06-21 | End: 2019-06-21

## 2019-06-21 RX ORDER — GLYCOPYRROLATE 0.2 MG/ML
INJECTION INTRAMUSCULAR; INTRAVENOUS
Status: DISCONTINUED | OUTPATIENT
Start: 2019-06-21 | End: 2019-06-21

## 2019-06-21 RX ORDER — ACETAMINOPHEN 10 MG/ML
INJECTION, SOLUTION INTRAVENOUS
Status: DISCONTINUED | OUTPATIENT
Start: 2019-06-21 | End: 2019-06-21

## 2019-06-21 RX ORDER — MIDAZOLAM HYDROCHLORIDE 1 MG/ML
INJECTION INTRAMUSCULAR; INTRAVENOUS
Status: DISCONTINUED | OUTPATIENT
Start: 2019-06-21 | End: 2019-06-21

## 2019-06-21 RX ORDER — SUCCINYLCHOLINE CHLORIDE 20 MG/ML
INJECTION INTRAMUSCULAR; INTRAVENOUS
Status: DISCONTINUED | OUTPATIENT
Start: 2019-06-21 | End: 2019-06-21

## 2019-06-21 RX ORDER — BUPIVACAINE HYDROCHLORIDE AND EPINEPHRINE 5; 5 MG/ML; UG/ML
INJECTION, SOLUTION EPIDURAL; INTRACAUDAL; PERINEURAL
Status: DISCONTINUED | OUTPATIENT
Start: 2019-06-21 | End: 2019-06-21 | Stop reason: HOSPADM

## 2019-06-21 RX ORDER — HYDROMORPHONE HYDROCHLORIDE 2 MG/ML
1 INJECTION, SOLUTION INTRAMUSCULAR; INTRAVENOUS; SUBCUTANEOUS EVERY 4 HOURS PRN
Status: DISCONTINUED | OUTPATIENT
Start: 2019-06-21 | End: 2019-06-22 | Stop reason: HOSPADM

## 2019-06-21 RX ORDER — SODIUM CHLORIDE 0.9 % (FLUSH) 0.9 %
10 SYRINGE (ML) INJECTION
Status: DISCONTINUED | OUTPATIENT
Start: 2019-06-21 | End: 2019-06-22 | Stop reason: HOSPADM

## 2019-06-21 RX ORDER — SODIUM CHLORIDE, SODIUM LACTATE, POTASSIUM CHLORIDE, CALCIUM CHLORIDE 600; 310; 30; 20 MG/100ML; MG/100ML; MG/100ML; MG/100ML
INJECTION, SOLUTION INTRAVENOUS CONTINUOUS PRN
Status: DISCONTINUED | OUTPATIENT
Start: 2019-06-21 | End: 2019-06-21

## 2019-06-21 RX ORDER — ONDANSETRON 2 MG/ML
4 INJECTION INTRAMUSCULAR; INTRAVENOUS
Status: COMPLETED | OUTPATIENT
Start: 2019-06-21 | End: 2019-06-21

## 2019-06-21 RX ORDER — PROPOFOL 10 MG/ML
VIAL (ML) INTRAVENOUS
Status: DISCONTINUED | OUTPATIENT
Start: 2019-06-21 | End: 2019-06-21

## 2019-06-21 RX ORDER — DEXAMETHASONE SODIUM PHOSPHATE 4 MG/ML
INJECTION, SOLUTION INTRA-ARTICULAR; INTRALESIONAL; INTRAMUSCULAR; INTRAVENOUS; SOFT TISSUE
Status: DISCONTINUED | OUTPATIENT
Start: 2019-06-21 | End: 2019-06-21

## 2019-06-21 RX ORDER — ONDANSETRON HYDROCHLORIDE 2 MG/ML
INJECTION, SOLUTION INTRAMUSCULAR; INTRAVENOUS
Status: DISCONTINUED | OUTPATIENT
Start: 2019-06-21 | End: 2019-06-21

## 2019-06-21 RX ORDER — ROCURONIUM BROMIDE 10 MG/ML
INJECTION, SOLUTION INTRAVENOUS
Status: DISCONTINUED | OUTPATIENT
Start: 2019-06-21 | End: 2019-06-21

## 2019-06-21 RX ORDER — KETOROLAC TROMETHAMINE 30 MG/ML
INJECTION, SOLUTION INTRAMUSCULAR; INTRAVENOUS
Status: DISCONTINUED | OUTPATIENT
Start: 2019-06-21 | End: 2019-06-21

## 2019-06-21 RX ADMIN — GLYCOPYRROLATE 0.4 MG: 0.2 INJECTION INTRAMUSCULAR; INTRAVENOUS at 09:06

## 2019-06-21 RX ADMIN — ACETAMINOPHEN 1000 MG: 10 INJECTION, SOLUTION INTRAVENOUS at 08:06

## 2019-06-21 RX ADMIN — KETOROLAC TROMETHAMINE 30 MG: 30 INJECTION, SOLUTION INTRAMUSCULAR; INTRAVENOUS at 09:06

## 2019-06-21 RX ADMIN — FENTANYL CITRATE 50 MCG: 50 INJECTION, SOLUTION INTRAMUSCULAR; INTRAVENOUS at 08:06

## 2019-06-21 RX ADMIN — SUCCINYLCHOLINE CHLORIDE 120 MG: 20 INJECTION, SOLUTION INTRAMUSCULAR; INTRAVENOUS at 08:06

## 2019-06-21 RX ADMIN — PROPOFOL 150 MG: 10 INJECTION, EMULSION INTRAVENOUS at 08:06

## 2019-06-21 RX ADMIN — ONDANSETRON 8 MG: 2 INJECTION, SOLUTION INTRAMUSCULAR; INTRAVENOUS at 08:06

## 2019-06-21 RX ADMIN — IOHEXOL 30 ML: 350 INJECTION, SOLUTION INTRAVENOUS at 06:06

## 2019-06-21 RX ADMIN — HYDROMORPHONE HYDROCHLORIDE 1 MG: 2 INJECTION, SOLUTION INTRAMUSCULAR; INTRAVENOUS; SUBCUTANEOUS at 10:06

## 2019-06-21 RX ADMIN — FENTANYL CITRATE 100 MCG: 50 INJECTION, SOLUTION INTRAMUSCULAR; INTRAVENOUS at 08:06

## 2019-06-21 RX ADMIN — SODIUM CHLORIDE, SODIUM LACTATE, POTASSIUM CHLORIDE, AND CALCIUM CHLORIDE: .6; .31; .03; .02 INJECTION, SOLUTION INTRAVENOUS at 08:06

## 2019-06-21 RX ADMIN — ONDANSETRON 4 MG: 2 INJECTION INTRAMUSCULAR; INTRAVENOUS at 06:06

## 2019-06-21 RX ADMIN — IOHEXOL 100 ML: 350 INJECTION, SOLUTION INTRAVENOUS at 07:06

## 2019-06-21 RX ADMIN — Medication 3 MG: at 09:06

## 2019-06-21 RX ADMIN — CEFOXITIN SODIUM 2 G: 2 INJECTION, SOLUTION INTRAVENOUS at 08:06

## 2019-06-21 RX ADMIN — ROCURONIUM BROMIDE 5 MG: 10 INJECTION, SOLUTION INTRAVENOUS at 08:06

## 2019-06-21 RX ADMIN — LIDOCAINE HYDROCHLORIDE 80 MG: 20 INJECTION, SOLUTION EPIDURAL; INFILTRATION; INTRACAUDAL; PERINEURAL at 08:06

## 2019-06-21 RX ADMIN — DEXAMETHASONE SODIUM PHOSPHATE 8 MG: 4 INJECTION, SOLUTION INTRAMUSCULAR; INTRAVENOUS at 08:06

## 2019-06-21 RX ADMIN — KETOROLAC TROMETHAMINE 30 MG: 30 INJECTION, SOLUTION INTRAMUSCULAR at 06:06

## 2019-06-21 RX ADMIN — MIDAZOLAM HYDROCHLORIDE 2 MG: 1 INJECTION, SOLUTION INTRAMUSCULAR; INTRAVENOUS at 08:06

## 2019-06-21 RX ADMIN — SODIUM CHLORIDE 1000 ML: 0.9 INJECTION, SOLUTION INTRAVENOUS at 05:06

## 2019-06-21 NOTE — ED TRIAGE NOTES
23 y.o. female presents to ER ED 01/ED 01B   Chief Complaint   Patient presents with    Abdominal Pain     RLQ   Pt reports RLQ onset this morning. Denies V/D. Positive for nausea. No acute distress noted.

## 2019-06-21 NOTE — ED PROVIDER NOTES
Encounter Date: 6/21/2019       History     Chief Complaint   Patient presents with    Abdominal Pain     RLQ     The history is provided by the patient.   Abdominal Pain   The current episode started today. The problem has been gradually worsening. The abdominal pain is located in the RLQ. The severity of the abdominal pain is 6/10. The other symptoms of the illness include nausea. The other symptoms of the illness do not include fever, shortness of breath, vomiting, diarrhea or dysuria.   Pregnant now: possible. Symptoms associated with the illness do not include constipation, urgency, frequency or back pain.     Review of patient's allergies indicates:   Allergen Reactions    Doxycycline Other (See Comments)     Yeast infection     Past Medical History:   Diagnosis Date    Elevated cholesterol     Heart disorder     hole in heart    Migraine headache     Migraines     Murmur     Tachycardia      Past Surgical History:   Procedure Laterality Date    ABLATION ENDOMETRIAL THERMAL - NOVASURE N/A 6/1/2018    Performed by [facility]    DILATION-CURETTAGE OF UTERUS (D AND C) N/A 6/1/2018    [facility]    EXCISION, CYST, OVARY, LAPAROSCOPIC Left 6/1/2018    [facility]    FULGURATION, ENDOMETRIOSIS N/A 6/1/2018    [facility]    LAPAROSCOPY-DIAGNOSTIC N/A 6/1/2018    [facility]    LYSIS, ADHESIONS N/A 6/1/2018    [facility]    PLACEMENT INTRAUTERINE DEVICE (IUD) N/A 6/1/2018    [facility]    REMOVAL, INTRAUTERINE DEVICE N/A 6/1/2018    [facility]    WISDOM TOOTH EXTRACTION  10/2012     Family History   Problem Relation Age of Onset    Diabetes Mother     Hyperlipidemia Mother     Hypertension Father     Hyperlipidemia Father     Cerebral palsy Brother         half  brother; dad's side    Heart disease Maternal Grandmother     Diabetes Maternal Grandmother     Heart disease Maternal Grandfather     Kidney disease Maternal Grandfather     Heart disease Paternal Grandmother     Heart disease Paternal Grandfather     Breast cancer Neg Hx     Colon cancer Neg Hx     Ovarian cancer Neg Hx      Social History     Tobacco Use    Smoking status: Never Smoker    Smokeless tobacco: Never Used   Substance Use Topics    Alcohol use: Yes     Comment: occ    Drug use: No     Review of Systems   Constitutional: Negative for fever.   HENT: Negative for congestion, ear pain, rhinorrhea, sore throat and trouble swallowing.    Eyes: Negative for pain, discharge, redness and visual disturbance.   Respiratory: Negative for cough, shortness of breath and wheezing.    Cardiovascular: Negative for chest pain and leg swelling.   Gastrointestinal: Positive for abdominal pain and nausea. Negative for constipation, diarrhea and vomiting.   Genitourinary: Negative for difficulty urinating, dysuria, flank pain, frequency and urgency.   Musculoskeletal: Negative for arthralgias, back pain, myalgias and neck pain.   Skin: Negative for rash and wound.   Neurological: Negative for seizures, weakness and headaches.   Psychiatric/Behavioral: Negative.        Physical Exam     Initial Vitals [06/21/19 1657]   BP Pulse Resp Temp SpO2   108/70 69 18 98.4 °F (36.9 °C) 99 %      MAP       --         Physical Exam    Nursing note and vitals reviewed.  Constitutional: No distress.   HENT:   Head: Normocephalic and atraumatic.   Right Ear: External ear normal.   Left Ear: External ear normal.   Nose: Nose normal.   Mouth/Throat: Oropharynx is clear and moist.   Eyes: Conjunctivae, EOM and lids are normal. Pupils are equal, round, and reactive to light.   Neck: Neck supple.   Cardiovascular: Normal rate, regular rhythm, S1 normal, S2 normal, normal heart sounds and intact distal pulses.   Pulmonary/Chest:  Effort normal and breath sounds normal. No respiratory distress.   Abdominal: Soft. Bowel sounds are normal. There is tenderness in the right upper quadrant and right lower quadrant.   Musculoskeletal: Normal range of motion.   Neurological: She is alert and oriented to person, place, and time. She has normal strength. GCS eye subscore is 4. GCS verbal subscore is 5. GCS motor subscore is 6.   Skin: Skin is warm and dry. Capillary refill takes less than 2 seconds. No rash noted.   Psychiatric: She has a normal mood and affect. Her speech is normal and behavior is normal.         ED Course   Procedures  Labs Reviewed   CBC W/ AUTO DIFFERENTIAL - Abnormal; Notable for the following components:       Result Value    Hemoglobin 11.8 (*)     Hematocrit 36.9 (*)     All other components within normal limits   COMPREHENSIVE METABOLIC PANEL - Abnormal; Notable for the following components:    Alkaline Phosphatase 53 (*)     All other components within normal limits   URINALYSIS, REFLEX TO URINE CULTURE - Abnormal; Notable for the following components:    Occult Blood UA Trace (*)     All other components within normal limits    Narrative:     Preferred Collection Type->Urine, Clean Catch   LIPASE   PREGNANCY TEST, URINE RAPID          Imaging Results          CT Abdomen Pelvis With Contrast (Final result)  Result time 06/21/19 19:26:55    Final result by Eren Stoddard Jr., MD (06/21/19 19:26:55)                 Impression:      Acute appendicitis without evidence of complication.      Electronically signed by: Eren Stoddard Jr., MD  Date:    06/21/2019  Time:    19:26             Narrative:    EXAMINATION:  CT ABDOMEN PELVIS WITH CONTRAST    CLINICAL HISTORY:  RLQ pain, appendicitis suspected;    TECHNIQUE:  Low dose axial images, sagittal and coronal reformations were obtained from the lung bases to the pubic symphysis following the IV administration of 100 mL of Omnipaque 350 and the oral administration of 30 mL of  Omnipaque 350. All CT scans at this facility use dose modulation, iterative reconstruction and/or weight based dosing when appropriate to reduce radiation dose to as low as reasonably achievable.    COMPARISON:  None.    FINDINGS:  Abdomen:    - Lower thorax:Normal heart size.    - Lung bases: No infiltrates and no nodules.    - Liver: No focal mass.    - Gallbladder: No calcified gallstones.    - Bile Ducts: No evidence of intra or extra hepatic biliary ductal dilation.    - Spleen: Negative.    - Kidneys: No mass or hydronephrosis.    - Adrenals: Unremarkable.    - Pancreas: No mass or peripancreatic fat stranding.    - Retroperitoneum:  No significant adenopathy.    - Vascular: No abdominal aortic aneurysm.    - Abdominal wall:  Fat containing umbilical hernia.    Pelvis:    No pelvic mass, adenopathy, or free fluid.    Bowel/Mesentery:    The appendix is abnormally dilated up to 13 mm with surrounding hazy inflammatory change and infiltration of the fat.  No sign of periappendiceal abscess.    Bones:  No acute osseous abnormality and no suspicious lytic or blastic lesion.                                        Medications   ceFOXItin 2 g/50ml Dextrose IVPB (has no administration in time range)   sodium chloride 0.9% bolus 1,000 mL (0 mLs Intravenous Stopped 6/21/19 1900)   ketorolac injection 30 mg (30 mg Intravenous Given 6/21/19 1812)   ondansetron injection 4 mg (4 mg Intravenous Given 6/21/19 1811)   iohexol (OMNIPAQUE 350) injection 50 mL (30 mLs Oral Given 6/21/19 1800)   iohexol (OMNIPAQUE 350) injection 100 mL (100 mLs Intravenous Given 6/21/19 1910)           --Surgeon was notified.  Will take patient to surgery.           Clinical Impression:       ICD-10-CM ICD-9-CM   1. Acute appendicitis, unspecified acute appendicitis type K35.80 540.9         Disposition:   Disposition: Other  To OR                        Lizbet Frazier NP  06/21/19 2014

## 2019-06-21 NOTE — PROGRESS NOTES
"Subjective:       Patient ID: Elizabeth Piña is a 23 y.o. female.    Vitals:  height is 5' 7" (1.702 m) and weight is 97.1 kg (214 lb). Her tympanic temperature is 98.7 °F (37.1 °C). Her blood pressure is 108/72 and her pulse is 76. Her respiration is 20 and oxygen saturation is 100%.     Chief Complaint: Abdominal Pain    23-year-old female presents to clinic today with complaints of right lower quadrant abdominal pain that started upon waking this morning.  Patient states that her pain is currently a 5 to 6/10, and she describes the pain as a sharp, aching, cramping pain. She does report that the pain radiates to her right flank.  Patient states that she has been nauseated but denies vomiting.  She denies fever and chills but does report a decreased appetite.  Patient also denies any urinary symptoms.  Patient states that she does have a history of ovarian cysts and endometriosis but states that this pain feels different.  Patient denies any other complaints at this time.    Abdominal Pain   This is a new problem. The current episode started today. The onset quality is sudden. The problem occurs intermittently. The problem has been gradually worsening. The pain is located in the RLQ. The pain is at a severity of 6/10. The pain is moderate. The quality of the pain is aching and cramping. The abdominal pain radiates to the right flank. Associated symptoms include belching, flatus and nausea. Pertinent negatives include no anorexia, arthralgias, constipation, diarrhea, dysuria, fever, frequency, headaches, hematochezia, hematuria, melena, myalgias, vomiting or weight loss. The pain is aggravated by movement. The pain is relieved by nothing. Treatments tried: aleve. The treatment provided mild relief. There is no history of abdominal surgery, colon cancer, Crohn's disease, gallstones, GERD, irritable bowel syndrome, pancreatitis, PUD or ulcerative colitis. Patient's medical history does not include kidney stones and " UTI.       Constitution: Positive for appetite change (decreased). Negative for chills, sweating and fever.   HENT: Negative for trouble swallowing.    Neck: negative.   Cardiovascular: Negative.  Negative for chest pain.   Eyes: Negative.    Respiratory: Negative for shortness of breath.    Gastrointestinal: Positive for abdominal pain, abdominal bloating and nausea. Negative for abdominal trauma, history of abdominal surgery, vomiting, constipation, diarrhea, bright red blood in stool, dark colored stools, rectal bleeding, rectal pain, hemorrhoids, heartburn and bowel incontinence.   Endocrine: negative.   Genitourinary: Positive for flank pain. Negative for dysuria, frequency, urgency, hematuria, missed menses and pelvic pain.   Musculoskeletal: Negative for joint pain, back pain and muscle ache.   Skin: Negative.    Allergic/Immunologic: Negative.    Neurological: Negative for headaches.   Psychiatric/Behavioral: Negative.        Objective:      Physical Exam   Constitutional: She is oriented to person, place, and time. She appears well-developed and well-nourished.   HENT:   Head: Normocephalic and atraumatic.   Right Ear: External ear normal.   Left Ear: External ear normal.   Nose: Nose normal.   Mouth/Throat: Mucous membranes are normal.   Eyes: Conjunctivae and lids are normal.   Neck: Trachea normal and full passive range of motion without pain. Neck supple.   Cardiovascular: Normal rate, regular rhythm and normal heart sounds.   Pulmonary/Chest: Effort normal and breath sounds normal. No respiratory distress.   Abdominal: Soft. Normal appearance and bowel sounds are normal. She exhibits no distension, no abdominal bruit, no pulsatile midline mass and no mass. There is no hepatosplenomegaly. There is tenderness in the right lower quadrant. There is tenderness at McBurney's point. There is no rigidity, no rebound, no guarding, no CVA tenderness and negative Fisher's sign.   Musculoskeletal: Normal range of  motion. She exhibits no edema.   Neurological: She is alert and oriented to person, place, and time. She has normal strength.   Skin: Skin is warm, dry and intact. She is not diaphoretic. No pallor.   Psychiatric: She has a normal mood and affect. Her speech is normal and behavior is normal. Judgment and thought content normal. Cognition and memory are normal.   Nursing note and vitals reviewed.      Assessment:       1. RLQ abdominal pain        Plan:         RLQ abdominal pain  -     Refer to Emergency Dept.    At this time, patient will be sent to ED to r/o worrisome causes of abdominal pain.       Patient Instructions   YOUR CONDITION IS POTENTIALLY LIFE THREATENING.  GO TO ER NOW FOR FURTHER EVALUATION AND TREATMENT.    You were offered transfer by ambulance.  You have declined ambulance transport and agree to go to nearest ER by private auto.  The risks of this decision were explained to you.

## 2019-06-21 NOTE — TELEPHONE ENCOUNTER
Called patient to remind her to walk over and sign Nexplanon form. Patient verbalized understanding.    -Emerald

## 2019-06-22 VITALS
DIASTOLIC BLOOD PRESSURE: 67 MMHG | RESPIRATION RATE: 16 BRPM | HEIGHT: 67 IN | WEIGHT: 226 LBS | TEMPERATURE: 98 F | SYSTOLIC BLOOD PRESSURE: 113 MMHG | HEART RATE: 66 BPM | OXYGEN SATURATION: 100 % | BODY MASS INDEX: 35.47 KG/M2

## 2019-06-22 PROCEDURE — 25000003 PHARM REV CODE 250: Performed by: SURGERY

## 2019-06-22 PROCEDURE — G0378 HOSPITAL OBSERVATION PER HR: HCPCS

## 2019-06-22 RX ORDER — HYDROCODONE BITARTRATE AND ACETAMINOPHEN 5; 325 MG/1; MG/1
1 TABLET ORAL EVERY 6 HOURS PRN
Qty: 20 TABLET | Refills: 0 | Status: SHIPPED | OUTPATIENT
Start: 2019-06-22 | End: 2019-07-23

## 2019-06-22 RX ORDER — SODIUM CHLORIDE 9 MG/ML
INJECTION, SOLUTION INTRAVENOUS CONTINUOUS
Status: CANCELLED | OUTPATIENT
Start: 2019-06-22

## 2019-06-22 RX ADMIN — HYDROCODONE BITARTRATE AND ACETAMINOPHEN 1 TABLET: 5; 325 TABLET ORAL at 08:06

## 2019-06-22 NOTE — NURSING
Pt resting comfortably family at bedside no further complaints of pain, asleep easily aroused, sites x 3 to abdomen well approximated shahzad no drainage  Hob elevated denies nausea tolerated regular diet, ambulates w/ sb assist to bathroom no problems w/ urination will cont to monitor vital signs cont. To be stable

## 2019-06-22 NOTE — ANESTHESIA PREPROCEDURE EVALUATION
06/21/2019  Elizabeth Piña is a 23 y.o., female.    Pre-op Assessment    I have reviewed the Patient Summary Reports.     I have reviewed the Nursing Notes.   I have reviewed the Medications.     Review of Systems  Anesthesia Hx:  No problems with previous Anesthesia   Denies Personal Hx of Anesthesia complications.   Social:  Non-Smoker, No Alcohol Use    Hematology/Oncology:  Hematology Normal   Oncology Normal     EENT/Dental:EENT/Dental Normal   Cardiovascular:  Cardiovascular Normal Exercise tolerance: good  Patient has PFO   Pulmonary:  Pulmonary Normal    Renal/:  Renal/ Normal     Hepatic/GI:  Hepatic/GI Normal    Musculoskeletal:  Musculoskeletal Normal    Neurological:   Headaches    Endocrine:  Endocrine Normal    Dermatological:  Skin Normal    Psych:  Psychiatric Normal           Physical Exam  General:  Well nourished    Airway/Jaw/Neck:  Airway Findings: Mouth Opening: Normal Tongue: Normal  General Airway Assessment: Adult  Mallampati: II  TM Distance: Normal, at least 6 cm      Dental:  Dental Findings: In tact             Anesthesia Plan  Type of Anesthesia, risks & benefits discussed:  Anesthesia Type:  general  Patient's Preference:   Intra-op Monitoring Plan: standard ASA monitors  Intra-op Monitoring Plan Comments:   Post Op Pain Control Plan: multimodal analgesia  Post Op Pain Control Plan Comments:   Induction:   IV  Beta Blocker:  Patient is on a Beta-Blocker and has received one dose within the past 24 hours (No further documentation required).       Informed Consent: Patient understands risks and agrees with Anesthesia plan.  Questions answered. Anesthesia consent signed with patient.  ASA Score: 2  emergent   Day of Surgery Review of History & Physical: I have interviewed and examined the patient. I have reviewed the patient's H&P dated: 6/21/19. There are no significant  changes.  H&P update referred to the surgeon.         Ready For Surgery From Anesthesia Perspective.

## 2019-06-22 NOTE — BRIEF OP NOTE
[facility]  Brief Operative Note     SUMMARY     Surgery Date: 6/21/2019     Surgeon(s) and Role:     * Lane Her Jr., MD - Primary    Assisting Surgeon: None    Pre-op Diagnosis:  Appendicitis [K37]    Post-op Diagnosis:  Post-Op Diagnosis Codes:     * Appendicitis [K37]    Procedure(s) (LRB):  APPENDECTOMY, LAPAROSCOPIC (Right)    Anesthesia: General    Description of the findings of the procedure:  Acute appendicitis, small fat containing umbilical hernia    Findings/Key Components:  Acute appendicitis, small fat containing umbilical hernia    Estimated Blood Loss:  5 mL         Specimens:   Specimen (12h ago, onward)    Start     Ordered    Signed and Held  Specimen to Pathology - Surgery  Once     Comments:  Pre-op: appendicitisPost-op: sameSpecimen: appendixProcedure: laparoscopic appendectomyPhysician: CHUY Her      Signed and Held          Discharge Note    SUMMARY     Admit Date: 6/21/2019    Discharge Date and Time:  06/21/2019 9:37 PM    Hospital Course (synopsis of major diagnoses, care, treatment, and services provided during the course of the hospital stay):      Final Diagnosis: Post-Op Diagnosis Codes:     * Appendicitis [K37]    Disposition: Home or Self Care    Follow Up/Patient Instructions:     Medications:  Reconciled Home Medications:      Medication List      ASK your doctor about these medications    B COMPLEX 1 ORAL     etonogestrel 68 mg Impl subdermal device  Commonly known as:  NEXPLANON  68 mg by Subdermal route once. for 1 dose     magnesium 200 mg Tab     montelukast 10 mg tablet  Commonly known as:  SINGULAIR  Take 1 tablet (10 mg total) by mouth once daily.     multivitamin Tab     norethindrone-ethinyl estradiol 1-20 mg-mcg per tablet  Commonly known as:  JUNEL 1/20 (21)  Take 1 tablet by mouth once daily.     prochlorperazine 10 MG tablet  Commonly known as:  COMPAZINE  Take 1 tablet (10 mg total) by mouth daily as needed.     propranolol 80 MG 24 hr  capsule  Commonly known as:  INDERAL LA  TAKE 1 CAPSULE BY MOUTH DAILY     rizatriptan 5 MG tablet  Commonly known as:  MAXALT  Take one daily as needed for headache. May repeat dose once after 2 hours if needed.     zonisamide 25 MG Cap  Commonly known as:  ZONEGRAN  Take one at night for one week, then 2 at night for 2 weeks, then 3 at night.          No discharge procedures on file.

## 2019-06-22 NOTE — ED NOTES
Received verbal report from JJ Hanna.  Pt in ED room ED 01/ED 01A lying in stretcher, HOB 30 degrees. Stretcher is in low, locked position, side rails up x2.  The patient is currently in testing.  Plan of care: observe and reassure, position of comfort, respirations even and unlabored, patient offers no complaints at this time, awaiting additional orders, will continue to monitor

## 2019-06-22 NOTE — TRANSFER OF CARE
Anesthesia Transfer of Care Note    Patient: Elizabeth Piña    Procedure(s) Performed: Procedure(s) (LRB):  APPENDECTOMY, LAPAROSCOPIC (Right)    Patient location: PACU    Anesthesia Type: general    Transport from OR: Transported from OR on 6-10 L/min O2 by face mask with adequate spontaneous ventilation    Post pain: adequate analgesia    Post assessment: no apparent anesthetic complications and tolerated procedure well    Post vital signs: stable    Level of consciousness: sedated    Nausea/Vomiting: no nausea/vomiting    Complications: none    Transfer of care protocol was followed      Last vitals:   Visit Vitals  /89 (BP Location: Right arm, Patient Position: Sitting)   Pulse (P) 98   Temp 35.9 °C (96.6 °F) (Oral)   Resp (P) 18   Wt 95.5 kg (210 lb 8.6 oz)   SpO2 (!) (P) 91%   Breastfeeding? No   BMI 32.98 kg/m²

## 2019-06-22 NOTE — ANESTHESIA POSTPROCEDURE EVALUATION
Anesthesia Post Evaluation    Patient: Elizabeth Piña    Procedure(s) Performed: Procedure(s) (LRB):  APPENDECTOMY, LAPAROSCOPIC (Right)    Final Anesthesia Type: general  Patient location during evaluation: PACU  Patient participation: Yes- Able to Participate  Level of consciousness: awake and alert, oriented and awake  Post-procedure vital signs: reviewed and stable  Pain management: adequate  Airway patency: patent  PONV status at discharge: No PONV  Anesthetic complications: no      Cardiovascular status: blood pressure returned to baseline  Respiratory status: unassisted, spontaneous ventilation and room air  Hydration status: euvolemic  Follow-up not needed.  Comments: [facility]          Vitals Value Taken Time   /68 6/21/2019  9:55 PM   Temp 35.9 °C (96.6 °F) 6/21/2019  7:27 PM   Pulse 57 6/21/2019  9:55 PM   Resp 18 6/21/2019  9:55 PM   SpO2 93 % 6/21/2019  9:55 PM         No case tracking events are documented in the log.      Pain/Anibal Score: Pain Rating Prior to Med Admin: 6 (6/21/2019  6:12 PM)  Anibal Score: 10 (6/21/2019 10:05 PM)

## 2019-06-22 NOTE — DISCHARGE INSTRUCTIONS
Discharge Instructions: After Your Surgery  Youve just had surgery. During surgery, you were given medicine called anesthesia to keep you relaxed and free of pain. After surgery, you may have some pain or nausea. This is common. Here are some tips for feeling better and getting well after surgery.     Stay on schedule with your medicine.   Going home  Your healthcare provider will show you how to take care of yourself when you go home. He or she will also answer your questions. Have an adult family member or friend drive you home. For the first 24 hours after your surgery:  · Do not drive or use heavy equipment.  · Do not make important decisions or sign legal papers.  · Do not drink alcohol.  · Have someone stay with you, if needed. He or she can watch for problems and help keep you safe.  Be sure to go to all follow-up visits with your healthcare provider. And rest after your surgery for as long as your healthcare provider tells you to.  Coping with pain  If you have pain after surgery, pain medicine will help you feel better. Take it as told, before pain becomes severe. Also, ask your healthcare provider or pharmacist about other ways to control pain. This might be with heat, ice, or relaxation. And follow any other instructions your surgeon or nurse gives you.  Tips for taking pain medicine  To get the best relief possible, remember these points:  · Pain medicines can upset your stomach. Taking them with a little food may help.  · Most pain relievers taken by mouth need at least 20 to 30 minutes to start to work.  · Taking medicine on a schedule can help you remember to take it. Try to time your medicine so that you can take it before starting an activity. This might be before you get dressed, go for a walk, or sit down for dinner.  · Constipation is a common side effect of pain medicines. Call your healthcare provider before taking any medicines such as laxatives or stool softeners to help ease constipation.  Also ask if you should skip any foods. Drinking lots of fluids and eating foods such as fruits and vegetables that are high in fiber can also help. Remember, do not take laxatives unless your surgeon has prescribed them.  · Drinking alcohol and taking pain medicine can cause dizziness and slow your breathing. It can even be deadly. Do not drink alcohol while taking pain medicine.  · Pain medicine can make you react more slowly to things. Do not drive or run machinery while taking pain medicine.  Your healthcare provider may tell you to take acetaminophen to help ease your pain. Ask him or her how much you are supposed to take each day. Acetaminophen or other pain relievers may interact with your prescription medicines or other over-the-counter (OTC) medicines. Some prescription medicines have acetaminophen and other ingredients. Using both prescription and OTC acetaminophen for pain can cause you to overdose. Read the labels on your OTC medicines with care. This will help you to clearly know the list of ingredients, how much to take, and any warnings. It may also help you not take too much acetaminophen. If you have questions or do not understand the information, ask your pharmacist or healthcare provider to explain it to you before you take the OTC medicine.  Managing nausea  Some people have an upset stomach after surgery. This is often because of anesthesia, pain, or pain medicine, or the stress of surgery. These tips will help you handle nausea and eat healthy foods as you get better. If you were on a special food plan before surgery, ask your healthcare provider if you should follow it while you get better. These tips may help:  · Do not push yourself to eat. Your body will tell you when to eat and how much.  · Start off with clear liquids and soup. They are easier to digest.  · Next try semi-solid foods, such as mashed potatoes, applesauce, and gelatin, as you feel ready.  · Slowly move to solid foods. Dont  eat fatty, rich, or spicy foods at first.  · Do not force yourself to have 3 large meals a day. Instead eat smaller amounts more often.  · Take pain medicines with a small amount of solid food, such as crackers or toast, to avoid nausea.     Call your surgeon if  · You still have pain an hour after taking medicine. The medicine may not be strong enough.  · You feel too sleepy, dizzy, or groggy. The medicine may be too strong.  · You have side effects like nausea, vomiting, or skin changes, such as rash, itching, or hives.       If you have obstructive sleep apnea  You were given anesthesia medicine during surgery to keep you comfortable and free of pain. After surgery, you may have more apnea spells because of this medicine and other medicines you were given. The spells may last longer than usual.   At home:  · Keep using the continuous positive airway pressure (CPAP) device when you sleep. Unless your healthcare provider tells you not to, use it when you sleep, day or night. CPAP is a common device used to treat obstructive sleep apnea.  · Talk with your provider before taking any pain medicine, muscle relaxants, or sedatives. Your provider will tell you about the possible dangers of taking these medicines.  Date Last Reviewed: 12/1/2016 © 2000-2017 [employer]. [address] [place] [address]. All rights reserved. This information is not intended as a substitute for professional medical care. Always follow your healthcare professional's instructions.        After Laparoscopic Appendectomy (Appendix Removal)  You have had a surgery to remove your appendix. The appendix is a narrow pouch attached to the lower right part of your large intestine. During your surgery, the doctor made 2 to 4 small incisions. One was near your belly button, and the others were elsewhere on your abdomen. Through one incision, the doctor inserted a thin tube with a camera attached (laparoscope). Other surgery  tools were used in the other incisions.  While you recover you may have pain in your shoulder and chest for up to 48 hours after surgery. This is common. It is caused by carbon dioxide gas used during the surgery. It will go away.   Home care  · Keep your incisions clean and dry.  · Don't pull off the thin strips of tape covering your incision. They should fall off on their own in a week or so.  · Wear loose-fitting clothes. This will help cause less irritation around your incisions.  · You can shower as usual. Gently wash around your incisions with soap and water. Dont take a bath until your incisions are fully healed.  · Dont drive until you have stopped taken prescription pain medicine.  · Dont lift anything heavier than 10 pounds until your healthcare provider says its OK.  · Limit sports and strenuous activities for 1 or 2 weeks.  · Resume light activities around your home as soon as you feel comfortable.  What to eat  Eat a bland, low-fat diet. This can include foods such as:  · Well-cooked soft cereals  · Mashed potatoes  · Plain toast or bread  · Plain crackers  · Plain pasta  · Rice  · Cottage cheese  · Pudding  · Low-fat yogurt  · Low-fat milk  · Ripe bananas  Drink 6 to 8 glasses of water a day, unless directed otherwise. If you are constipated, take a fiber laxative or a stool softener.  When to call your healthcare provider   Call your healthcare provider right away if you have any of the following:  · Swelling, pain, fluid, or redness in the incision that gets worse  · Fever of 100.4°F (38°C) or higher, or as directed by your healthcare provider  · Belly (abdominal) pain that gets worse  · Severe diarrhea, bloating, or constipation  · Nausea or vomiting   Date Last Reviewed: 10/1/2016  © 8593-9653 [employer]. [address] [place] [address], [place] [address]. All rights reserved. This information is not intended as a substitute for professional medical care. Always follow your healthcare  professional's instructions.

## 2019-06-22 NOTE — NURSING
Report received from Stacie. Pt lying in bed with mother at bedside. Abdominal incisions intact with no redness, swelling or drainage noted. Bowel sounds hypoactive. Pt tolerating diet; voiding without issue. Denies nausea, vomiting or pain. Call bell within reach. Reminded to call for needs.

## 2019-06-22 NOTE — OP NOTE
[facility]  Appendectomy, Laparoscopic  Procedure Note    SUMMARY     Date of Procedure: 6/21/2019    Procedure: Procedure(s) (LRB):  APPENDECTOMY, LAPAROSCOPIC (Right)    Surgeon(s) and Role:     * Lane Her Jr., MD - Primary    Assisting Surgeon: None    Indications: The patient presented with a history of right-sided abdominal pain. A CT scan revealed findings consistent with acute appendicitis.    Pre-Operative Diagnosis: Acute appendicitis without mention of peritonitis    Post-Operative Diagnosis: Acute appendicitis without mention of peritonitis, small fat containing umbilical hernia    Anesthesia: General    Technical Procedures Used:  Laparoscopic appendectomy    Description of the Findings of the Procedure:    The patient was seen again in the Holding Room. The risks, benefits, complications, treatment options, and expected outcomes were discussed with the patient and/or family. The possibilities of reaction to medication, pulmonary aspiration, perforation of viscus, bleeding, recurrent infection, finding a normal appendix, the need for additional procedures, failure to diagnose a condition, and creating a complication requiring transfusion or operation were discussed. There was concurrence with the proposed plan and informed consent was obtained.  The patient was taken to Operating Room, identified as Elizabeth Piña and the procedure verified as Appendectomy. A Time Out was held and the above information confirmed.    The patient was placed in the supine position and general anesthesia was induced and a Woodward catheter inserted. The abdomen was prepped and draped in a sterile fashion. Patient had a small umbilical hernia. A small  incision was made just above the umbilicus and the peritoneal cavity was accessed using the Veress needle technique. The pneumoperitoneum was then established to steady pressure of 15 mmHg. A 5 mm Versaport was placed through the umbilical incision.  Additional 5 mm cannulas then placed in the right upper quadrant. A 12 mm suprapubic trocar was placed under direct vision.   The patient was noted to have a fat containing umbilical hernia. The patient was placed in Trendelenburg and left lateral decubitus position. The small intestines were retracted in the cephalad and left lateral direction away from the pelvis and right lower quadrant. The patient was found to have an enlarged and inflamed appendix. There was no evidence of perforation.    The appendix was carefully dissected. A window was made in the mesoappendix at the base of the appendix. A vascular load endo-AZAEL was fired across the mesoappendix. The appendix was divided at its base using an endo-AZAEL stapler. No appendiceal stump was left in place. There was no evidence of bleeding, leakage, or complication after division of the appendix. Irrigation was also performed and irrigate suctioned from the abdomen as well. The appendix was placed in a bag and removed through the 12 mm suprapubic fascia site.    The suprapubic port site was closed using 0 Vicryl at the level of the fascia. The trocar site skin wounds were closed using 4-O Monocryl.    Instrument, sponge, and needle counts were correct at the conclusion of the case.     The appendix was found to be inflamed. There were not signs of necrosis.  There was not perforation. There was not abscess formation.    Significant surgical tasks conducted by the assistant(s), if applicable:      Complications: None; patient tolerated the procedure well.           Total IV Fluids: mL           Estimated Blood Loss (EBL): Minimal    Drains:     Implants:     Specimens:  Appendix           Condition: stable    Disposition: PACU - hemodynamically stable.    Attestation: I performed the procedure.

## 2019-06-22 NOTE — NURSING
Pt arrived sarah floor via stretcher aao c/o pain 7/10 to abdomen , s/p lap appe lap sites x 3 well approximated shahzad bs low pitched, abdomen tender to touch, walter pt ox3 pain med administered as per md order family atg bedside bbs clear vss, resp e/u nad will cont to monitor

## 2019-06-22 NOTE — H&P
"Patient ID: Elizabeth Piña is a 23 y.o. female.    Chief Complaint: Abdominal Pain (RLQ)      HPI:  23-year-old female presents to clinic today with complaints of right lower quadrant abdominal pain that started upon waking this morning.  Patient states that her pain is currently a 5 to 6/10, and she describes the pain as a sharp, aching, cramping pain. She does report that the pain radiates to her right flank.  Patient states that she has been nauseated but denies vomiting.  She denies fever and chills but does report a decreased appetite.  Patient also denies any urinary symptoms.  Patient states that she does have a history of ovarian cysts and endometriosis but states that this pain feels "different".       Review of Systems   Gastrointestinal: Positive for abdominal pain.   All other systems reviewed and are negative.      No current facility-administered medications for this encounter.      Current Outpatient Medications   Medication Sig Dispense Refill    etonogestrel (NEXPLANON) 68 mg Impl subdermal device 68 mg by Subdermal route once. for 1 dose 1 each 0    m-vit,tx,iron,mins/calc/folic (MULTIVITAMIN) Tab       magnesium 200 mg Tab       montelukast (SINGULAIR) 10 mg tablet Take 1 tablet (10 mg total) by mouth once daily. 30 tablet 12    norethindrone-ethinyl estradiol (JUNEL 1/20, 21,) 1-20 mg-mcg per tablet Take 1 tablet by mouth once daily. 28 tablet 11    prochlorperazine (COMPAZINE) 10 MG tablet Take 1 tablet (10 mg total) by mouth daily as needed. 10 tablet 1    propranolol (INDERAL LA) 80 MG 24 hr capsule TAKE 1 CAPSULE BY MOUTH DAILY 90 capsule 4    rizatriptan (MAXALT) 5 MG tablet Take one daily as needed for headache. May repeat dose once after 2 hours if needed. 9 tablet 0    vitamin B complex (B COMPLEX 1 ORAL)       zonisamide (ZONEGRAN) 25 MG Cap Take one at night for one week, then 2 at night for 2 weeks, then 3 at night. 90 capsule 11       Review of patient's allergies indicates: "   Allergen Reactions    Doxycycline Other (See Comments)     Yeast infection       Past Medical History:   Diagnosis Date    Elevated cholesterol     Heart disorder     hole in heart    Migraine headache     Migraines     Murmur     Tachycardia        Past Surgical History:   Procedure Laterality Date    ABLATION ENDOMETRIAL THERMAL - NOVASURE N/A 6/1/2018    [facility]    DILATION-CURETTAGE OF UTERUS (D AND C) N/A 6/1/2018    [facility] OR    EXCISION, CYST, OVARY, LAPAROSCOPIC Left 6/1/2018    [facility]    FULGURATION, ENDOMETRIOSIS N/A 6/1/2018    [facility] OR    LAPAROSCOPY-DIAGNOSTIC N/A 6/1/2018    [facility] OR    LYSIS, ADHESIONS N/A 6/1/2018    [facility] OR    PLACEMENT INTRAUTERINE DEVICE (IUD) N/A 6/1/2018    [facility]    REMOVAL, INTRAUTERINE DEVICE N/A 6/1/2018    [facility]    WISDOM TOOTH EXTRACTION  10/2012       Family History   Problem Relation Age of Onset    Diabetes Mother     Hyperlipidemia Mother     Hypertension Father     Hyperlipidemia Father     Cerebral palsy Brother         half brother; dad's side    Heart disease Maternal Grandmother     Diabetes Maternal Grandmother     Heart disease Maternal Grandfather     Kidney disease Maternal Grandfather     Heart disease Paternal Grandmother     Heart disease Paternal Grandfather     Breast cancer Neg Hx     Colon cancer Neg Hx     Ovarian cancer Neg Hx        Social History     Socioeconomic History    Marital status:      Spouse name: Not on file    Number of children: Not on file    Years of education: Not on file    Highest education level: Not on file   Occupational History    Not on file   Social Needs    Financial resource strain: Not on file    Food  insecurity:     Worry: Not on file     Inability: Not on file    Transportation needs:     Medical: Not on file     Non-medical: Not on file   Tobacco Use    Smoking status: Never Smoker    Smokeless tobacco: Never Used   Substance and Sexual Activity    Alcohol use: Yes     Comment: occ    Drug use: No    Sexual activity: Yes     Partners: Male     Birth control/protection: IUD     Comment:  Mirena- 6/1/2018   Lifestyle    Physical activity:     Days per week: Not on file     Minutes per session: Not on file    Stress: Not on file   Relationships    Social connections:     Talks on phone: Not on file     Gets together: Not on file     Attends [employer] service: Not on file     Active member of club or organization: Not on file     Attends meetings of clubs or organizations: Not on file     Relationship status: Not on file   Other Topics Concern    Not on file   Social History Narrative    ** Merged History Encounter **            Vitals:    06/21/19 1927   BP: 125/89   Pulse: 87   Resp: 16   Temp: 96.6 °F (35.9 °C)       Physical Exam   Constitutional: She is oriented to person, place, and time. She appears well-developed and well-nourished.   HENT:   Head: Normocephalic.   Eyes: Pupils are equal, round, and reactive to light.   Neck: Normal range of motion.   Pulmonary/Chest: Effort normal.   Abdominal: Soft.   Patient with right lower quadrant pain at McBurney's point consistent with appendicitis.   Musculoskeletal: Normal range of motion.   Neurological: She is alert and oriented to person, place, and time.   Skin: Skin is warm and dry.   Psychiatric: She has a normal mood and affect.       Assessment & Plan:   Acute appendicitis.  Laparoscopic appendectomy.

## 2019-06-22 NOTE — DISCHARGE SUMMARY
[facility]  General Surgery  Discharge Summary      Patient Name: Elizabeth Piña  MRN: [id number]  Admission Date: 6/21/2019  Hospital Length of Stay: 0 days  Discharge Date and Time:  06/22/2019 9:35 AM  Attending Physician: Lane Her Jr., MD   Discharging Provider: Lane Her Jr, MD  Primary Care Provider: Bisi Gtotlieb MD     HPI:  Patient who was admitted for acute appendicitis.  She underwent uneventful laparoscopic appendectomy last night.  This morning she is doing well.  She is ready to go home.  She will be discharged home and follow up with me in a week.    Procedure(s) (LRB):  APPENDECTOMY, LAPAROSCOPIC (Right)     Hospital Course: Patient who was admitted for acute appendicitis.  She underwent uneventful laparoscopic appendectomy last night.  This morning she is doing well.  She is ready to go home.  She will be discharged home and follow up with me in a week.    Consults:     Significant Diagnostic Studies: Radiology: CT scan: CT ABDOMEN PELVIS WITH CONTRAST:   Results for orders placed or performed during the hospital encounter of 06/21/19   CT Abdomen Pelvis With Contrast    Narrative    EXAMINATION:  CT ABDOMEN PELVIS WITH CONTRAST    CLINICAL HISTORY:  RLQ pain, appendicitis suspected;    TECHNIQUE:  Low dose axial images, sagittal and coronal reformations were obtained from the lung bases to the pubic symphysis following the IV administration of 100 mL of Omnipaque 350 and the oral administration of 30 mL of Omnipaque 350. All CT scans at this facility use dose modulation, iterative reconstruction and/or weight based dosing when appropriate to reduce radiation dose to as low as reasonably achievable.    COMPARISON:  None.    FINDINGS:  Abdomen:    - Lower thorax:Normal heart size.    - Lung bases: No infiltrates and no nodules.    - Liver: No focal mass.    - Gallbladder: No calcified gallstones.    - Bile Ducts: No evidence of intra or extra hepatic biliary  ductal dilation.    - Spleen: Negative.    - Kidneys: No mass or hydronephrosis.    - Adrenals: Unremarkable.    - Pancreas: No mass or peripancreatic fat stranding.    - Retroperitoneum:  No significant adenopathy.    - Vascular: No abdominal aortic aneurysm.    - Abdominal wall:  Fat containing umbilical hernia.    Pelvis:    No pelvic mass, adenopathy, or free fluid.    Bowel/Mesentery:    The appendix is abnormally dilated up to 13 mm with surrounding hazy inflammatory change and infiltration of the fat.  No sign of periappendiceal abscess.    Bones:  No acute osseous abnormality and no suspicious lytic or blastic lesion.      Impression    Acute appendicitis without evidence of complication.      Electronically signed by: Eren Stoddard Jr., MD  Date:    06/21/2019  Time:    19:26       Pending Diagnostic Studies:     None        Final Active Diagnoses:    Diagnosis Date Noted POA    PRINCIPAL PROBLEM:  Acute appendicitis [K35.80] 06/21/2019 Yes      Problems Resolved During this Admission:      Discharged Condition: good    Disposition:     Follow Up:  Follow-up Information     Call Lane Her Jr, MD.    Specialty:  General Surgery  Contact information:  [address]   [address] [place] [id number]  [phone]                 Patient Instructions:      Diet general     No dressing needed     Activity as tolerated     Medications:  Reconciled Home Medications:      Medication List      START taking these medications    HYDROcodone-acetaminophen 5-325 mg per tablet  Commonly known as:  NORCO  Take 1 tablet by mouth every 6 (six) hours as needed for Pain.        CONTINUE taking these medications    B COMPLEX 1 ORAL     etonogestrel 68 mg Impl subdermal device  Commonly known as:  NEXPLANON  68 mg by Subdermal route once. for 1 dose     magnesium 200 mg Tab     montelukast 10 mg tablet  Commonly known as:  SINGULAIR  Take 1 tablet (10 mg total) by mouth once daily.     multivitamin Tab      norethindrone-ethinyl estradiol 1-20 mg-mcg per tablet  Commonly known as:  JUNEL 1/20 (21)  Take 1 tablet by mouth once daily.     prochlorperazine 10 MG tablet  Commonly known as:  COMPAZINE  Take 1 tablet (10 mg total) by mouth daily as needed.     propranolol 80 MG 24 hr capsule  Commonly known as:  INDERAL LA  TAKE 1 CAPSULE BY MOUTH DAILY     rizatriptan 5 MG tablet  Commonly known as:  MAXALT  Take one daily as needed for headache. May repeat dose once after 2 hours if needed.     zonisamide 25 MG Cap  Commonly known as:  ZONEGRAN  Take one at night for one week, then 2 at night for 2 weeks, then 3 at night.            Lane Her Jr, MD  General Surgery  [facility]

## 2019-06-22 NOTE — NURSING
Pt in stable condition. Ambulating, voiding and tolerating PO. Discharged home. Reviewed discharge instructions, follow up appts, medications and reportable signs and symptoms with pt and mother; state understanding.

## 2019-07-01 ENCOUNTER — PATIENT MESSAGE (OUTPATIENT)
Dept: OBSTETRICS AND GYNECOLOGY | Facility: CLINIC | Age: 24
End: 2019-07-01

## 2019-07-02 ENCOUNTER — OFFICE VISIT (OUTPATIENT)
Dept: SURGERY | Facility: CLINIC | Age: 24
End: 2019-07-02
Payer: COMMERCIAL

## 2019-07-02 VITALS
HEIGHT: 67 IN | BODY MASS INDEX: 33.01 KG/M2 | HEART RATE: 74 BPM | SYSTOLIC BLOOD PRESSURE: 120 MMHG | WEIGHT: 210.31 LBS | DIASTOLIC BLOOD PRESSURE: 78 MMHG | OXYGEN SATURATION: 98 %

## 2019-07-02 DIAGNOSIS — K35.30 ACUTE APPENDICITIS WITH LOCALIZED PERITONITIS, WITHOUT PERFORATION, ABSCESS, OR GANGRENE: Primary | ICD-10-CM

## 2019-07-02 PROCEDURE — 99999 PR PBB SHADOW E&M-EST. PATIENT-LVL III: CPT | Mod: PBBFAC,,, | Performed by: SURGERY

## 2019-07-02 PROCEDURE — 99499 NO LOS: ICD-10-PCS | Mod: S$GLB,,, | Performed by: SURGERY

## 2019-07-02 PROCEDURE — 99999 PR PBB SHADOW E&M-EST. PATIENT-LVL III: ICD-10-PCS | Mod: PBBFAC,,, | Performed by: SURGERY

## 2019-07-02 PROCEDURE — 99499 UNLISTED E&M SERVICE: CPT | Mod: S$GLB,,, | Performed by: SURGERY

## 2019-07-02 NOTE — LETTER
July 2, 2019      Khushi Vela, PHILIPP  [address]  [facility] [address]           [facility]  [address] [place] [id number]  Phone: [phone]          Patient: Elizabeth Piña   MR Number: [id number]   YOB: 1995   Date of Visit: 7/2/2019       Dear Khushi Vela:    Thank you for referring Elizabeth Piña to me for evaluation. Attached you will find relevant portions of my assessment and plan of care.    If you have questions, please do not hesitate to call me. I look forward to following Elizabeth Piña along with you.    Sincerely,    Lane Her Jr., MD    Enclosure  CC:  No Recipients    If you would like to receive this communication electronically, please contact [email or web address] or [phone] to request more information on [employer] Link access.    For providers and/or their staff who would like to refer a patient to [facility], please contact us through our one-stop-shop provider referral line, [facility], at [phone].    If you feel you have received this communication in error or would no longer like to receive these types of communications, please e-mail [email or web address]

## 2019-07-02 NOTE — PROGRESS NOTES
Status post laparoscopic appendectomy 06/21/2019.  Patient here for postoperative visit.  She denies any complaints.  No nausea or vomiting.  No fevers.  Normal bowel movements.  Incisions are healing.  No evidence of redness or drainage.  Patient will be discharged from surgical clinic.  She can return to full duty work tomorrow.  Follow-up as needed.

## 2019-07-17 RX ORDER — RIZATRIPTAN BENZOATE 5 MG/1
TABLET ORAL
Qty: 9 TABLET | Refills: 0 | Status: SHIPPED | OUTPATIENT
Start: 2019-07-17 | End: 2019-08-21 | Stop reason: SDUPTHER

## 2019-07-23 ENCOUNTER — HOSPITAL ENCOUNTER (OUTPATIENT)
Dept: RADIOLOGY | Facility: HOSPITAL | Age: 24
Discharge: HOME | End: 2019-07-23
Attending: SURGERY
Payer: COMMERCIAL

## 2019-07-23 ENCOUNTER — OFFICE VISIT (OUTPATIENT)
Dept: SURGERY | Facility: CLINIC | Age: 24
End: 2019-07-23
Payer: COMMERCIAL

## 2019-07-23 ENCOUNTER — HOSPITAL ENCOUNTER (OUTPATIENT)
Dept: PULMONOLOGY | Facility: HOSPITAL | Age: 24
Discharge: HOME | End: 2019-07-23
Attending: SURGERY
Payer: COMMERCIAL

## 2019-07-23 VITALS
HEIGHT: 67 IN | HEART RATE: 72 BPM | WEIGHT: 203.5 LBS | RESPIRATION RATE: 16 BRPM | DIASTOLIC BLOOD PRESSURE: 70 MMHG | BODY MASS INDEX: 31.94 KG/M2 | SYSTOLIC BLOOD PRESSURE: 116 MMHG

## 2019-07-23 DIAGNOSIS — K42.0 INCARCERATED UMBILICAL HERNIA: ICD-10-CM

## 2019-07-23 DIAGNOSIS — K42.0 INCARCERATED UMBILICAL HERNIA: Primary | ICD-10-CM

## 2019-07-23 PROCEDURE — 99999 PR PBB SHADOW E&M-EST. PATIENT-LVL IV: ICD-10-PCS | Mod: PBBFAC,,, | Performed by: SURGERY

## 2019-07-23 PROCEDURE — 93010 ELECTROCARDIOGRAM REPORT: CPT | Mod: ,,, | Performed by: INTERNAL MEDICINE

## 2019-07-23 PROCEDURE — 71046 X-RAY EXAM CHEST 2 VIEWS: CPT | Mod: TC

## 2019-07-23 PROCEDURE — 99213 PR OFFICE/OUTPT VISIT, EST, LEVL III, 20-29 MIN: ICD-10-PCS | Mod: 24,S$GLB,, | Performed by: SURGERY

## 2019-07-23 PROCEDURE — 3008F BODY MASS INDEX DOCD: CPT | Mod: CPTII,S$GLB,, | Performed by: SURGERY

## 2019-07-23 PROCEDURE — 93010 EKG 12-LEAD: ICD-10-PCS | Mod: ,,, | Performed by: INTERNAL MEDICINE

## 2019-07-23 PROCEDURE — 71046 X-RAY EXAM CHEST 2 VIEWS: CPT | Mod: 26,,, | Performed by: RADIOLOGY

## 2019-07-23 PROCEDURE — 71046 XR CHEST PA AND LATERAL: ICD-10-PCS | Mod: 26,,, | Performed by: RADIOLOGY

## 2019-07-23 PROCEDURE — 93005 ELECTROCARDIOGRAM TRACING: CPT

## 2019-07-23 PROCEDURE — 99213 OFFICE O/P EST LOW 20 MIN: CPT | Mod: 24,S$GLB,, | Performed by: SURGERY

## 2019-07-23 PROCEDURE — 3008F PR BODY MASS INDEX (BMI) DOCUMENTED: ICD-10-PCS | Mod: CPTII,S$GLB,, | Performed by: SURGERY

## 2019-07-23 PROCEDURE — 99999 PR PBB SHADOW E&M-EST. PATIENT-LVL IV: CPT | Mod: PBBFAC,,, | Performed by: SURGERY

## 2019-07-23 RX ORDER — LIDOCAINE HYDROCHLORIDE 10 MG/ML
1 INJECTION, SOLUTION EPIDURAL; INFILTRATION; INTRACAUDAL; PERINEURAL ONCE
Status: DISCONTINUED | OUTPATIENT
Start: 2019-07-23 | End: 2019-08-06 | Stop reason: HOSPADM

## 2019-07-23 RX ORDER — SODIUM CHLORIDE, SODIUM LACTATE, POTASSIUM CHLORIDE, CALCIUM CHLORIDE 600; 310; 30; 20 MG/100ML; MG/100ML; MG/100ML; MG/100ML
INJECTION, SOLUTION INTRAVENOUS CONTINUOUS
Status: CANCELLED | OUTPATIENT
Start: 2019-07-23

## 2019-07-23 NOTE — PROGRESS NOTES
Subjective:       Patient ID: Elizabeth Coreas is a 24 y.o. female.    Chief Complaint: Hernia    Review of patient's allergies indicates:   Allergen Reactions    Doxycycline Other (See Comments)     Yeast infection     24-year-old female who I did a laparoscopic appendectomy for approximately 1 month ago.  At that time she had an umbilical hernia but it was not bothering her.  She has since noticed that the hernia is sticking out more and does not go back in.  She has completely recovered from her appendectomy.  She is now wanting her umbilical hernia repaired as it is now incarcerated and more symptomatic.  She does not have any strangulation or obstructive symptoms.  This will be performed electively.  I discussed with her that possibility I may have to use mesh as the defect may be larger than 2 cm.    Past Medical History:   Diagnosis Date    Elevated cholesterol     Heart disorder     hole in heart    Migraine headache     Migraines     Murmur     Tachycardia      Past Surgical History:   Procedure Laterality Date    ABLATION ENDOMETRIAL THERMAL - NOVASURE N/A 6/1/2018    [facility]    APPENDECTOMY, LAPAROSCOPIC Right 6/21/2019    [facility] Lane Her [facility]    DILATION-CURETTAGE OF UTERUS (D AND C) N/A 6/1/2018    [facility]    EXCISION, CYST, OVARY, LAPAROSCOPIC Left 6/1/2018    [facility]    FULGURATION, ENDOMETRIOSIS N/A 6/1/2018    [facility]    LAPAROSCOPY-DIAGNOSTIC N/A 6/1/2018    [facility]    LYSIS, ADHESIONS N/A 6/1/2018    [facility]    PLACEMENT INTRAUTERINE DEVICE (IUD) N/A 6/1/2018    [facility]    REMOVAL, INTRAUTERINE DEVICE N/A 6/1/2018    [facility]    WISDOM TOOTH EXTRACTION  10/2012     Family History   Problem  Relation Age of Onset    Diabetes Mother     Hyperlipidemia Mother     Hypertension Father     Hyperlipidemia Father     Cerebral palsy Brother         half brother; dad's side    Heart disease Maternal Grandmother     Diabetes Maternal Grandmother     Heart disease Maternal Grandfather     Kidney disease Maternal Grandfather     Heart disease Paternal Grandmother     Heart disease Paternal Grandfather     Breast cancer Neg Hx     Colon cancer Neg Hx     Ovarian cancer Neg Hx      Social History     Socioeconomic History    Marital status:      Spouse name: Not on file    Number of children: Not on file    Years of education: Not on file    Highest education level: Not on file   Occupational History    Not on file   Social Needs    Financial resource strain: Not on file    Food insecurity:     Worry: Not on file     Inability: Not on file    Transportation needs:     Medical: Not on file     Non-medical: Not on file   Tobacco Use    Smoking status: Never Smoker    Smokeless tobacco: Never Used   Substance and Sexual Activity    Alcohol use: Yes     Comment: occ    Drug use: No    Sexual activity: Yes     Partners: Male     Birth control/protection: IUD     Comment:  Mirena- 6/1/2018   Lifestyle    Physical activity:     Days per week: Not on file     Minutes per session: Not on file    Stress: Not on file   Relationships    Social connections:     Talks on phone: Not on file     Gets together: Not on file     Attends Episcopal service: Not on file     Active member of club or organization: Not on file     Attends meetings of clubs or organizations: Not on file     Relationship status: Not on file   Other Topics Concern    Not on file   Social History Narrative    ** Merged History Encounter **          Vitals:    07/23/19 1018   BP: 116/70   Pulse: 72   Resp: 16       Review of Systems   Gastrointestinal: Positive for abdominal pain.   All other systems reviewed and  are negative.      Objective:      Physical Exam   Constitutional: She is oriented to person, place, and time. She appears well-developed and well-nourished.   HENT:   Head: Normocephalic.   Eyes: Pupils are equal, round, and reactive to light.   Neck: Normal range of motion.   Pulmonary/Chest: Effort normal.   Abdominal: Soft. A hernia (Patient with incarcerated umbilical hernia without any skin changes or evidence of strangulation.) is present.   Musculoskeletal: Normal range of motion.   Neurological: She is alert and oriented to person, place, and time.   Skin: Skin is warm and dry.   Psychiatric: She has a normal mood and affect.       Assessment:       1. Incarcerated umbilical hernia        Plan:         Elizabeth was seen today for hernia.    Diagnoses and all orders for this visit:    Incarcerated umbilical hernia     Patient will be scheduled for elective incarcerated umbilical hernia repair with possible mesh.    No follow-ups on file.          Lane Her Jr, MD

## 2019-07-23 NOTE — H&P (VIEW-ONLY)
Subjective:       Patient ID: Elizabeth Coreas is a 24 y.o. female.    Chief Complaint: Hernia    Review of patient's allergies indicates:   Allergen Reactions    Doxycycline Other (See Comments)     Yeast infection     24-year-old female who I did a laparoscopic appendectomy for approximately 1 month ago.  At that time she had an umbilical hernia but it was not bothering her.  She has since noticed that the hernia is sticking out more and does not go back in.  She has completely recovered from her appendectomy.  She is now wanting her umbilical hernia repaired as it is now incarcerated and more symptomatic.  She does not have any strangulation or obstructive symptoms.  This will be performed electively.  I discussed with her that possibility I may have to use mesh as the defect may be larger than 2 cm.    Past Medical History:   Diagnosis Date    Elevated cholesterol     Heart disorder     hole in heart    Migraine headache     Migraines     Murmur     Tachycardia      Past Surgical History:   Procedure Laterality Date    ABLATION ENDOMETRIAL THERMAL - NOVASURE N/A 6/1/2018    [facility]    APPENDECTOMY, LAPAROSCOPIC Right 6/21/2019    [facility] Lane Her [facility]    DILATION-CURETTAGE OF UTERUS (D AND C) N/A 6/1/2018    [facility]    EXCISION, CYST, OVARY, LAPAROSCOPIC Left 6/1/2018    [facility]    FULGURATION, ENDOMETRIOSIS N/A 6/1/2018    [facility]    LAPAROSCOPY-DIAGNOSTIC N/A 6/1/2018    [facility]    LYSIS, ADHESIONS N/A 6/1/2018    [facility]    PLACEMENT INTRAUTERINE DEVICE (IUD) N/A 6/1/2018    [facility]    REMOVAL, INTRAUTERINE DEVICE N/A 6/1/2018    [facility]    WISDOM TOOTH EXTRACTION  10/2012     Family History   Problem  Relation Age of Onset    Diabetes Mother     Hyperlipidemia Mother     Hypertension Father     Hyperlipidemia Father     Cerebral palsy Brother         half brother; dad's side    Heart disease Maternal Grandmother     Diabetes Maternal Grandmother     Heart disease Maternal Grandfather     Kidney disease Maternal Grandfather     Heart disease Paternal Grandmother     Heart disease Paternal Grandfather     Breast cancer Neg Hx     Colon cancer Neg Hx     Ovarian cancer Neg Hx      Social History     Socioeconomic History    Marital status:      Spouse name: Not on file    Number of children: Not on file    Years of education: Not on file    Highest education level: Not on file   Occupational History    Not on file   Social Needs    Financial resource strain: Not on file    Food insecurity:     Worry: Not on file     Inability: Not on file    Transportation needs:     Medical: Not on file     Non-medical: Not on file   Tobacco Use    Smoking status: Never Smoker    Smokeless tobacco: Never Used   Substance and Sexual Activity    Alcohol use: Yes     Comment: occ    Drug use: No    Sexual activity: Yes     Partners: Male     Birth control/protection: IUD     Comment:  Mirena- 6/1/2018   Lifestyle    Physical activity:     Days per week: Not on file     Minutes per session: Not on file    Stress: Not on file   Relationships    Social connections:     Talks on phone: Not on file     Gets together: Not on file     Attends Sikh service: Not on file     Active member of club or organization: Not on file     Attends meetings of clubs or organizations: Not on file     Relationship status: Not on file   Other Topics Concern    Not on file   Social History Narrative    ** Merged History Encounter **          Vitals:    07/23/19 1018   BP: 116/70   Pulse: 72   Resp: 16       Review of Systems   Gastrointestinal: Positive for abdominal pain.   All other systems reviewed and  are negative.      Objective:      Physical Exam   Constitutional: She is oriented to person, place, and time. She appears well-developed and well-nourished.   HENT:   Head: Normocephalic.   Eyes: Pupils are equal, round, and reactive to light.   Neck: Normal range of motion.   Pulmonary/Chest: Effort normal.   Abdominal: Soft. A hernia (Patient with incarcerated umbilical hernia without any skin changes or evidence of strangulation.) is present.   Musculoskeletal: Normal range of motion.   Neurological: She is alert and oriented to person, place, and time.   Skin: Skin is warm and dry.   Psychiatric: She has a normal mood and affect.       Assessment:       1. Incarcerated umbilical hernia        Plan:         Elizabeth was seen today for hernia.    Diagnoses and all orders for this visit:    Incarcerated umbilical hernia     Patient will be scheduled for elective incarcerated umbilical hernia repair with possible mesh.    No follow-ups on file.          Lane Her Jr, MD

## 2019-07-26 ENCOUNTER — PROCEDURE VISIT (OUTPATIENT)
Dept: OBSTETRICS AND GYNECOLOGY | Facility: CLINIC | Age: 24
End: 2019-07-26
Payer: COMMERCIAL

## 2019-07-26 VITALS
WEIGHT: 204.56 LBS | HEIGHT: 67 IN | DIASTOLIC BLOOD PRESSURE: 79 MMHG | SYSTOLIC BLOOD PRESSURE: 118 MMHG | BODY MASS INDEX: 32.11 KG/M2

## 2019-07-26 DIAGNOSIS — N94.10 DYSPAREUNIA IN FEMALE: ICD-10-CM

## 2019-07-26 DIAGNOSIS — Z30.017 ENCOUNTER FOR INITIAL PRESCRIPTION OF NEXPLANON: ICD-10-CM

## 2019-07-26 DIAGNOSIS — N80.9 ENDOMETRIOSIS: Primary | ICD-10-CM

## 2019-07-26 PROCEDURE — 11981 INSERTION DRUG DLVR IMPLANT: CPT | Mod: S$GLB,,, | Performed by: OBSTETRICS & GYNECOLOGY

## 2019-07-26 PROCEDURE — 11981 INSERTION OF NEXPLANON: ICD-10-PCS | Mod: S$GLB,,, | Performed by: OBSTETRICS & GYNECOLOGY

## 2019-07-26 NOTE — PROCEDURES
Insertion of Nexplanon  Date/Time: 7/26/2019 1:05 PM  Performed by: Julia Fagan MD  Authorized by: Julia Fagan MD     Consent obtained:  Written  Consent given by:  Patient  Patient questions answered: yes    Patient agrees, verbalizes understanding, and wants to proceed: yes    Educational handouts given: yes    Instructions and paperwork completed: yes    Pre-procedure timeout performed: yes    Local anesthetic:  Lidocaine with epinephrine  The site was cleaned and prepped in a sterile fashion: yes    Small stab incision was made in arm: yes    Left/right:  Left   68 mg etonogestrel 68 mg  Preloaded Implanon trocar was placed subdermally: yes    Visualization of implant was obtained: yes    Nexplanon was inserted and trocar removed: yes    Visualization of notch in stilette and palpitation of device: yes    Palpitation confirms placement by provider and patient: yes    Site was closed with steri-strips and pressure bandage applied: yes     Orlissa prescribed as discussed in previous visit.

## 2019-07-29 ENCOUNTER — ANESTHESIA EVENT (OUTPATIENT)
Dept: SURGERY | Facility: HOSPITAL | Age: 24
End: 2019-07-29
Payer: COMMERCIAL

## 2019-07-29 ENCOUNTER — HOSPITAL ENCOUNTER (OUTPATIENT)
Dept: PREADMISSION TESTING | Facility: HOSPITAL | Age: 24
Discharge: HOME | End: 2019-07-29
Attending: SURGERY
Payer: COMMERCIAL

## 2019-07-29 NOTE — DISCHARGE INSTRUCTIONS
[facility]  Pre Admit Instructions    Day and Date of Procedure: Tuesday 8/6/19  Arrival time: 930am      · Call your doctor if you become ill before your surgery  · Someone will call you between 1 p.m. And 5 p.m.the workday before the procedure to give you an arrival time       - 7 a.m. To 5 p.m. Enter through Patient Registration Main Lobby  · You must have a responsible  to bring you home    Do NOT eat or drink anything   past  Midnight the night before your procedure    Please    · Do not wear makeup, jewelry, nail polish or body piercings  · Bring containers/solution for contacts, dentures, bridges - these and hearing aids will be removed before your procedure  · Do not bring cash, jewelry or valuables the day of your procedure   · No smoking at least 24 hours before your procedure  · Wear clothing that is comfortable and easy to take off and put on  · Do NOT shave for at least 5 days before your surgery    Review skin preparation handout before using. Shower with Hibiclens the Night before the procedure.                 Information about your stay (Please Review)    Before Surgery  1. Cafeteria Meals: 7am to 10am; 11am to 1:30 pm; Dinner/Supper must may be ordered between 11:00 am and 4 pm from the [facility] After [employer] Menu. Food will be available to  between 5 pm and 6 pm. The kitchen phone extension is [phone].  2. Your doctor may order and review labs, x-rays, ECG or other tests as a pre-surgery workup and will call you if there is need for follow up.  3. No smoking inside or outside the hospital on hospital grounds.  4. Wear clothing that is easy to take off and put on.  The hospital will provide you with a gown.  5. You may bring robe, slippers, nightwear, and toiletries (toothbrush, toothpaste, makeup).  6. If your doctor orders a Fleets Enema or other prep, follow package and/or doctors orders.  7. Brush your teeth and rinse your mouth the morning of surgery, but dont  swallow the water.  8. The nurse will ask questions and check your condition.  The doctor may kameron your surgical site.  9. Compression boots may be put on your calves to reduce the risk of blood clots.  10. The doctor may order medicine to help you relax before surgery.  After Surgery  1. The nurse will check your temperature, breathing, blood pressure, heart rate, IV site, and surgery site.  2. A diet will be ordered-most start with ice chips and then advance slowly to other foods.  3. If you have IV fluids the IV pump will beep to let the nurse know that she needs to check it.  4. You may have a urinary catheter and staff may measure your oral intake and urine output.  5. Pain medication may be ordered by the doctor after surgery.  If you have a pain management device tell your caretakers not to press the button because of OVERDOSE RISK.  6. When the nurse or doctor tells you it is okay to get out of bed, ask for help until you are stable.  7. The nurse may ask you to turn, cough, and deep breathe to prevent lung problems.  You can use a pillow to hold your incision when you deep breathe or cough to reduce pain.  8. The nurse will give you discharge instructions--incision care, symptoms to report to your doctor, and your follow-up appointment when you are discharged.  You cannot drive yourself home.  Goal for Discharge from One Day Surgery  · Control pain with an oral medication  · Walk without feeling dizzy or weak  · Tolerate liquids well  · Urinate without difficulty    Things you can do to  Reduce the Risk of Infections or Complications  Wash Hands and use Waterless Hand Sanitizers  · Wash hands frequently with soap and warm water for at least 15 seconds.   · Use hand sanitizers (alcohol based) often at home and in public if hands are not visibly soiled  Take Antibiotic Exactly as Prescribed  · Do not stop antibiotics too soon; you risk developing infection resistant to antibiotics  · Take your antibiotic even  if you are feeling better and even if they upset your stomach  · Call the doctor if you cant tolerate the antibiotic or you have an allergic reaction  Stay Healthy  · Take medicines as prescribed by your doctor  · Keep your diabetes under control - diet and medication  · Get enough rest, exercise and eat a healthy diet  Keep the Wound Clean and Dry  · Wash hands before and after taking care of the incision (cut)  · Wash hands when you remove a dressing, before you touch/apply a new dressing  · Shower and clean incision with antibacterial soap and rinse well if the doctor approves  · Allow the cut to dry completely before putting on a clean dressing  · Do not touch the part of the bandage that will cover the incision  · Do not use ointments unless your doctor tells you to-can promote bacterial growth  · If ordered, put ointment directly on the dressing-do not touch the end of the tube  · Do not scrub, remove scabs, or leave a damp dressing on the incision  · Do not use peroxide or alcohol to clean the incision unless the doctor tells you to   · Do not let children, pets or anyone else contaminate the incision  Stop Smoking To Prevent Infection  · Stop smoking-Centers for Disease Control recommends 30 days before surgery  · Smokers get more infections after surgery-studies have shown 6 times the risk  · Smokers have more scarring and heal slower-open wounds get infected easier  Prevent Respiratory complications  · Stop smoking  · Turn, cough, and deep breathe even if you have some pain when you do so.  · Splint your incision with a pillow when you cough/deep breath, to help control pain.  · Do not lie in one position for long periods of time.   Prevent Blood Clots  · When you wake move your legs, flex your feet, rotate your ankles, wiggle your toes  · Get up when the doctor says its ok.  Dangle your feet from the side of the bed  · Report symptoms-leg pain, redness/swelling, warm to touch; fever; shortness of  breath, chest pain, severe upper back pain.

## 2019-07-29 NOTE — ANESTHESIA PREPROCEDURE EVALUATION
07/29/2019  Elizabeth Coreas is a 24 y.o., female.    Pre-op Assessment    I have reviewed the Patient Summary Reports.     I have reviewed the Nursing Notes.   I have reviewed the Medications.     Review of Systems  Anesthesia Hx:  No problems with previous Anesthesia   Denies Personal Hx of Anesthesia complications.   Social:  Non-Smoker, No Alcohol Use    Hematology/Oncology:  Hematology Normal   Oncology Normal     EENT/Dental:EENT/Dental Normal   Cardiovascular:  Cardiovascular Normal Exercise tolerance: good  Patient has PFO   Pulmonary:  Pulmonary Normal    Renal/:  Renal/ Normal     Hepatic/GI:  Hepatic/GI Normal    Musculoskeletal:  Musculoskeletal Normal    Neurological:   Headaches    Endocrine:  Endocrine Normal    Dermatological:  Skin Normal    Psych:  Psychiatric Normal           Physical Exam  General:  Well nourished    Airway/Jaw/Neck:  Airway Findings: Mouth Opening: Normal Tongue: Normal  General Airway Assessment: Adult  Mallampati: II  TM Distance: Normal, at least 6 cm      Dental:  Dental Findings: In tact             Anesthesia Plan  Type of Anesthesia, risks & benefits discussed:  Anesthesia Type:  general  Patient's Preference:   Intra-op Monitoring Plan: standard ASA monitors  Intra-op Monitoring Plan Comments:   Post Op Pain Control Plan: multimodal analgesia  Post Op Pain Control Plan Comments:   Induction:   IV  Beta Blocker:  Patient is on a Beta-Blocker and has received one dose within the past 24 hours (No further documentation required).       Informed Consent: Patient understands risks and agrees with Anesthesia plan.  Questions answered. Anesthesia consent signed with patient.  ASA Score: 2     Day of Surgery Review of History & Physical: I have interviewed and examined the patient. I have reviewed the patient's H&P dated:  There are no significant changes.  H&P update  referred to the surgeon.         Ready For Surgery From Anesthesia Perspective.

## 2019-07-30 ENCOUNTER — PATIENT MESSAGE (OUTPATIENT)
Dept: OBSTETRICS AND GYNECOLOGY | Facility: CLINIC | Age: 24
End: 2019-07-30

## 2019-07-30 DIAGNOSIS — N80.9 ENDOMETRIOSIS: ICD-10-CM

## 2019-07-30 DIAGNOSIS — N94.10 DYSPAREUNIA IN FEMALE: ICD-10-CM

## 2019-08-02 ENCOUNTER — PATIENT MESSAGE (OUTPATIENT)
Dept: OBSTETRICS AND GYNECOLOGY | Facility: CLINIC | Age: 24
End: 2019-08-02

## 2019-08-02 DIAGNOSIS — N80.9 ENDOMETRIOSIS: ICD-10-CM

## 2019-08-02 DIAGNOSIS — N94.10 DYSPAREUNIA IN FEMALE: ICD-10-CM

## 2019-08-06 ENCOUNTER — ANESTHESIA (OUTPATIENT)
Dept: SURGERY | Facility: HOSPITAL | Age: 24
End: 2019-08-06
Payer: COMMERCIAL

## 2019-08-06 ENCOUNTER — HOSPITAL ENCOUNTER (OUTPATIENT)
Facility: HOSPITAL | Age: 24
Discharge: HOME | End: 2019-08-06
Attending: SURGERY | Admitting: SURGERY
Payer: COMMERCIAL

## 2019-08-06 VITALS
TEMPERATURE: 97 F | OXYGEN SATURATION: 99 % | SYSTOLIC BLOOD PRESSURE: 113 MMHG | RESPIRATION RATE: 20 BRPM | DIASTOLIC BLOOD PRESSURE: 77 MMHG | HEART RATE: 60 BPM

## 2019-08-06 DIAGNOSIS — K42.0 INCARCERATED UMBILICAL HERNIA: Primary | ICD-10-CM

## 2019-08-06 LAB — B-HCG UR QL: NEGATIVE

## 2019-08-06 PROCEDURE — 71000033 HC RECOVERY, INTIAL HOUR: Performed by: SURGERY

## 2019-08-06 PROCEDURE — 81025 URINE PREGNANCY TEST: CPT

## 2019-08-06 PROCEDURE — 63600175 PHARM REV CODE 636 W HCPCS: Performed by: NURSE ANESTHETIST, CERTIFIED REGISTERED

## 2019-08-06 PROCEDURE — 36000707: Performed by: SURGERY

## 2019-08-06 PROCEDURE — 00830 ANES HERNIA RPR LWR ABD NOS: CPT | Performed by: SURGERY

## 2019-08-06 PROCEDURE — 88302 TISSUE EXAM BY PATHOLOGIST: CPT | Performed by: PATHOLOGY

## 2019-08-06 PROCEDURE — 88302 TISSUE EXAM BY PATHOLOGIST: CPT | Mod: 26,,, | Performed by: PATHOLOGY

## 2019-08-06 PROCEDURE — 25000003 PHARM REV CODE 250: Performed by: NURSE ANESTHETIST, CERTIFIED REGISTERED

## 2019-08-06 PROCEDURE — 37000008 HC ANESTHESIA 1ST 15 MINUTES: Performed by: SURGERY

## 2019-08-06 PROCEDURE — 88302 TISSUE SPECIMEN TO PATHOLOGY - SURGERY: ICD-10-PCS | Mod: 26,,, | Performed by: PATHOLOGY

## 2019-08-06 PROCEDURE — 49585 PR REPAIR UMBILICAL HERN,5+Y/O,REDUC: ICD-10-PCS | Mod: 79,,, | Performed by: SURGERY

## 2019-08-06 PROCEDURE — 00830 ANES HERNIA RPR LWR ABD NOS: CPT | Mod: QZ,P2 | Performed by: NURSE ANESTHETIST, CERTIFIED REGISTERED

## 2019-08-06 PROCEDURE — 37000009 HC ANESTHESIA EA ADD 15 MINS: Performed by: SURGERY

## 2019-08-06 PROCEDURE — 25000003 PHARM REV CODE 250: Performed by: SURGERY

## 2019-08-06 PROCEDURE — 36000706: Performed by: SURGERY

## 2019-08-06 PROCEDURE — 49585 PR REPAIR UMBILICAL HERN,5+Y/O,REDUC: CPT | Mod: 79,,, | Performed by: SURGERY

## 2019-08-06 RX ORDER — LIDOCAINE HYDROCHLORIDE 20 MG/ML
INJECTION, SOLUTION EPIDURAL; INFILTRATION; INTRACAUDAL; PERINEURAL
Status: DISCONTINUED | OUTPATIENT
Start: 2019-08-06 | End: 2019-08-06

## 2019-08-06 RX ORDER — ACETAMINOPHEN 10 MG/ML
INJECTION, SOLUTION INTRAVENOUS
Status: DISCONTINUED | OUTPATIENT
Start: 2019-08-06 | End: 2019-08-06

## 2019-08-06 RX ORDER — HYDROCODONE BITARTRATE AND ACETAMINOPHEN 5; 325 MG/1; MG/1
1 TABLET ORAL EVERY 6 HOURS PRN
Qty: 30 TABLET | Refills: 0 | Status: SHIPPED | OUTPATIENT
Start: 2019-08-06 | End: 2019-09-27

## 2019-08-06 RX ORDER — ONDANSETRON 2 MG/ML
4 INJECTION INTRAMUSCULAR; INTRAVENOUS EVERY 12 HOURS PRN
Status: DISCONTINUED | OUTPATIENT
Start: 2019-08-06 | End: 2019-08-06 | Stop reason: HOSPADM

## 2019-08-06 RX ORDER — SODIUM CHLORIDE, SODIUM LACTATE, POTASSIUM CHLORIDE, CALCIUM CHLORIDE 600; 310; 30; 20 MG/100ML; MG/100ML; MG/100ML; MG/100ML
INJECTION, SOLUTION INTRAVENOUS CONTINUOUS
Status: DISCONTINUED | OUTPATIENT
Start: 2019-08-06 | End: 2019-08-06 | Stop reason: HOSPADM

## 2019-08-06 RX ORDER — CEFAZOLIN SODIUM 2 G/50ML
2 SOLUTION INTRAVENOUS
Status: DISCONTINUED | OUTPATIENT
Start: 2019-08-06 | End: 2019-08-06 | Stop reason: HOSPADM

## 2019-08-06 RX ORDER — ONDANSETRON HYDROCHLORIDE 2 MG/ML
INJECTION, SOLUTION INTRAMUSCULAR; INTRAVENOUS
Status: DISCONTINUED | OUTPATIENT
Start: 2019-08-06 | End: 2019-08-06

## 2019-08-06 RX ORDER — MIDAZOLAM HYDROCHLORIDE 1 MG/ML
INJECTION INTRAMUSCULAR; INTRAVENOUS
Status: DISCONTINUED | OUTPATIENT
Start: 2019-08-06 | End: 2019-08-06

## 2019-08-06 RX ORDER — GLYCOPYRROLATE 0.2 MG/ML
INJECTION INTRAMUSCULAR; INTRAVENOUS
Status: DISCONTINUED | OUTPATIENT
Start: 2019-08-06 | End: 2019-08-06

## 2019-08-06 RX ORDER — PROPOFOL 10 MG/ML
VIAL (ML) INTRAVENOUS
Status: DISCONTINUED | OUTPATIENT
Start: 2019-08-06 | End: 2019-08-06

## 2019-08-06 RX ORDER — HYDROMORPHONE HYDROCHLORIDE 1 MG/ML
1 INJECTION, SOLUTION INTRAMUSCULAR; INTRAVENOUS; SUBCUTANEOUS EVERY 4 HOURS PRN
Status: DISCONTINUED | OUTPATIENT
Start: 2019-08-06 | End: 2019-08-06 | Stop reason: HOSPADM

## 2019-08-06 RX ORDER — BUPIVACAINE HYDROCHLORIDE AND EPINEPHRINE 5; 5 MG/ML; UG/ML
INJECTION, SOLUTION EPIDURAL; INTRACAUDAL; PERINEURAL
Status: DISCONTINUED | OUTPATIENT
Start: 2019-08-06 | End: 2019-08-06 | Stop reason: HOSPADM

## 2019-08-06 RX ORDER — ROCURONIUM BROMIDE 10 MG/ML
INJECTION, SOLUTION INTRAVENOUS
Status: DISCONTINUED | OUTPATIENT
Start: 2019-08-06 | End: 2019-08-06

## 2019-08-06 RX ORDER — DEXAMETHASONE SODIUM PHOSPHATE 4 MG/ML
INJECTION, SOLUTION INTRA-ARTICULAR; INTRALESIONAL; INTRAMUSCULAR; INTRAVENOUS; SOFT TISSUE
Status: DISCONTINUED | OUTPATIENT
Start: 2019-08-06 | End: 2019-08-06

## 2019-08-06 RX ORDER — HYDROCODONE BITARTRATE AND ACETAMINOPHEN 5; 325 MG/1; MG/1
1 TABLET ORAL EVERY 4 HOURS PRN
Status: DISCONTINUED | OUTPATIENT
Start: 2019-08-06 | End: 2019-08-06 | Stop reason: HOSPADM

## 2019-08-06 RX ORDER — NEOSTIGMINE METHYLSULFATE 5 MG/5 ML
SYRINGE (ML) INTRAVENOUS
Status: DISCONTINUED | OUTPATIENT
Start: 2019-08-06 | End: 2019-08-06

## 2019-08-06 RX ORDER — SODIUM CHLORIDE, SODIUM LACTATE, POTASSIUM CHLORIDE, CALCIUM CHLORIDE 600; 310; 30; 20 MG/100ML; MG/100ML; MG/100ML; MG/100ML
INJECTION, SOLUTION INTRAVENOUS CONTINUOUS PRN
Status: DISCONTINUED | OUTPATIENT
Start: 2019-08-06 | End: 2019-08-06

## 2019-08-06 RX ORDER — FENTANYL CITRATE 50 UG/ML
INJECTION, SOLUTION INTRAMUSCULAR; INTRAVENOUS
Status: DISCONTINUED | OUTPATIENT
Start: 2019-08-06 | End: 2019-08-06

## 2019-08-06 RX ORDER — SODIUM CHLORIDE 9 MG/ML
INJECTION, SOLUTION INTRAVENOUS CONTINUOUS
Status: DISCONTINUED | OUTPATIENT
Start: 2019-08-06 | End: 2019-08-06 | Stop reason: HOSPADM

## 2019-08-06 RX ADMIN — FENTANYL CITRATE 50 MCG: 50 INJECTION, SOLUTION INTRAMUSCULAR; INTRAVENOUS at 01:08

## 2019-08-06 RX ADMIN — ACETAMINOPHEN 1000 MG: 10 INJECTION, SOLUTION INTRAVENOUS at 12:08

## 2019-08-06 RX ADMIN — FENTANYL CITRATE 150 MCG: 50 INJECTION, SOLUTION INTRAMUSCULAR; INTRAVENOUS at 12:08

## 2019-08-06 RX ADMIN — ROCURONIUM BROMIDE 30 MG: 10 INJECTION, SOLUTION INTRAVENOUS at 12:08

## 2019-08-06 RX ADMIN — SODIUM CHLORIDE, SODIUM LACTATE, POTASSIUM CHLORIDE, AND CALCIUM CHLORIDE: .6; .31; .03; .02 INJECTION, SOLUTION INTRAVENOUS at 12:08

## 2019-08-06 RX ADMIN — MIDAZOLAM HYDROCHLORIDE 2 MG: 1 INJECTION, SOLUTION INTRAMUSCULAR; INTRAVENOUS at 12:08

## 2019-08-06 RX ADMIN — PROPOFOL 200 MG: 10 INJECTION, EMULSION INTRAVENOUS at 12:08

## 2019-08-06 RX ADMIN — FENTANYL CITRATE 50 MCG: 50 INJECTION, SOLUTION INTRAMUSCULAR; INTRAVENOUS at 12:08

## 2019-08-06 RX ADMIN — LIDOCAINE HYDROCHLORIDE 50 MG: 20 INJECTION, SOLUTION EPIDURAL; INFILTRATION; INTRACAUDAL; PERINEURAL at 12:08

## 2019-08-06 RX ADMIN — Medication 4 MG: at 12:08

## 2019-08-06 RX ADMIN — DEXAMETHASONE SODIUM PHOSPHATE 8 MG: 4 INJECTION, SOLUTION INTRAMUSCULAR; INTRAVENOUS at 12:08

## 2019-08-06 RX ADMIN — ONDANSETRON 8 MG: 2 INJECTION, SOLUTION INTRAMUSCULAR; INTRAVENOUS at 12:08

## 2019-08-06 RX ADMIN — GLYCOPYRROLATE 0.6 MG: 0.2 INJECTION INTRAMUSCULAR; INTRAVENOUS at 12:08

## 2019-08-06 NOTE — NURSING
Patient in stable condition. Ambulating, voiding and tolerating PO well. No complaints of abdominal pain.  Denies nausea or vomiting. Incision to umbilicus dry and intact with no redness, swelling or drainage noted.  Reviewed discharge instructions, medications, follow up appointments and reportable signs and symptoms with patient and significant other.  Patient voiced understanding.  Discharged home via wheelchair accompanied to lobby with transport staff.

## 2019-08-06 NOTE — BRIEF OP NOTE
[facility]  Brief Operative Note     SUMMARY     Surgery Date: 8/6/2019     Surgeon(s) and Role:     * Lane Her Jr., MD - Primary    Assisting Surgeon: None    Pre-op Diagnosis:  Incarcerated umbilical hernia [K42.0]    Post-op Diagnosis:  Post-Op Diagnosis Codes:     * Incarcerated umbilical hernia [K42.0]    Procedure(s) (LRB):  REPAIR, HERNIA, UMBILICAL, INCARCERATED (N/A)    Anesthesia: General    Description of the findings of the procedure:  1 cm fascial defect    Findings/Key Components:  1 cm fascial defect    Estimated Blood Loss:  5 mL         Specimens:   Specimen (12h ago, onward)    Start     Ordered    08/06/19 1234  Specimen to Pathology - Surgery  Once     Comments:  Pre-op:incarcerated umbilical herniaPost-op:sameProcedure:repair, hernia, umbilicalSpecimen:umbilical hernia sacPhysician: Arden     Start Status     08/06/19 1234 Collected (08/06/19 1236) Order ID: [id number]       08/06/19 1234          Discharge Note    SUMMARY     Admit Date: 8/6/2019    Discharge Date and Time:  08/06/2019 12:46 PM    Hospital Course (synopsis of major diagnoses, care, treatment, and services provided during the course of the hospital stay):  Status post incarcerated umbilical hernia repair, discharged home, regular diet, activity as tolerated, stable condition, follow up 1 week.      Final Diagnosis: Post-Op Diagnosis Codes:     * Incarcerated umbilical hernia [K42.0]    Disposition: Home or Self Care    Follow Up/Patient Instructions:     Medications:  Reconciled Home Medications:      Medication List      START taking these medications    HYDROcodone-acetaminophen 5-325 mg per tablet  Commonly known as:  NORCO  Take 1 tablet by mouth every 6 (six) hours as needed for Pain.        CONTINUE taking these medications    elagolix 150 mg Tab  Commonly known as:  ORILISSA  Take 1 tablet (150 mg) by mouth once daily.     etonogestrel 68 mg Impl subdermal device  Commonly known as:   NEXPLANON  68 mg by Subdermal route once. for 1 dose     prochlorperazine 10 MG tablet  Commonly known as:  COMPAZINE  Take 1 tablet (10 mg total) by mouth daily as needed.     propranolol 80 MG 24 hr capsule  Commonly known as:  INDERAL LA  TAKE 1 CAPSULE BY MOUTH DAILY     rizatriptan 5 MG tablet  Commonly known as:  MAXALT  TAKE ONE DAILY AS NEEDED FOR HEADACHE. MAY REPEAT DOSE ONCE AFTER 2 HOURS IF NEEDED.          Discharge Procedure Orders   Diet general     No dressing needed     Activity as tolerated     Follow-up Information     Call Lane Her Jr, MD.    Specialty:  General Surgery

## 2019-08-06 NOTE — OP NOTE
Operative Note       Surgery Date: 8/6/2019     Surgeon(s) and Role:     * Lane Her Jr., MD - Primary    Pre-op Diagnosis:  Incarcerated umbilical hernia [K42.0]    Post-op Diagnosis: Post-Op Diagnosis Codes:     * Incarcerated umbilical hernia [K42.0]    Procedure(s) (LRB):  REPAIR, HERNIA, UMBILICAL, INCARCERATED (N/A)    Anesthesia: General    Procedure in Detail/Findings:  Patient was taken to the operating room where she was placed on a standard operating room table in supine position.  Her abdomen was prepped and draped after general anesthesia was established.  I injected 0.5% Marcaine with epinephrine at the inferior umbilical crease.  Small transverse incision was made with a 15 blade at the inferior umbilical crease.  Cautery was used to get through subcutaneous tissue and down to the fascia. I was able to get a hemostat around the umbilical fascial defect.  I then sharply dissected the hernia sac from the underside of the umbilical skin with a 15 blade.  I then removed the hernia sac and sent it off as a specimen.  The fascial defect was actually a lot smaller than I thought it was.  The defect only measured 1 cm.  I closed the defect primarily with a 0 PDS suture x2 interrupted.  I then injected more 0.5% Marcaine with epinephrine I then tacked the underside of the umbilicus to the fascia with the 3 0 Vicryl interrupted.  The deep tissue was closed with 3 0 Vicryl interrupted.  Skin was closed with a running 4 Monocryl subcuticular and then Dermabond glue.    Estimated Blood Loss: 5mL           Specimens (From admission, onward)    Start     Ordered    08/06/19 1234  Specimen to Pathology - Surgery  Once     Start Status     08/06/19 1234 Collected (08/06/19 1236) Order ID: [id number]       08/06/19 1234        Implants: * No implants in log *           Disposition: PACU - hemodynamically stable.           Condition:  Stable    Attestation:  I performed the procedure.           Discharge  Note    Admit Date: 8/6/2019    Attending Physician: Lane Her Jr., MD     Discharge Physician: Lane Her Jr., MD    Final Diagnosis: Post-Op Diagnosis Codes:     * Incarcerated umbilical hernia [K42.0]    Disposition: Home or Self Care    Patient Instructions:   Current Discharge Medication List      START taking these medications    Details   HYDROcodone-acetaminophen (NORCO) 5-325 mg per tablet Take 1 tablet by mouth every 6 (six) hours as needed for Pain.  Qty: 30 tablet, Refills: 0         CONTINUE these medications which have NOT CHANGED    Details   prochlorperazine (COMPAZINE) 10 MG tablet Take 1 tablet (10 mg total) by mouth daily as needed.  Qty: 10 tablet, Refills: 1      propranolol (INDERAL LA) 80 MG 24 hr capsule TAKE 1 CAPSULE BY MOUTH DAILY  Qty: 90 capsule, Refills: 4    Comments: propranolol 80mg 24 hour capsule ok per kay      rizatriptan (MAXALT) 5 MG tablet TAKE ONE DAILY AS NEEDED FOR HEADACHE. MAY REPEAT DOSE ONCE AFTER 2 HOURS IF NEEDED.  Qty: 9 tablet, Refills: 0      elagolix (ORILISSA) 150 mg Tab Take 1 tablet (150 mg) by mouth once daily.  Qty: 30 tablet, Refills: 11    Associated Diagnoses: Endometriosis; Dyspareunia in female      etonogestrel (NEXPLANON) 68 mg Impl subdermal device 68 mg by Subdermal route once. for 1 dose  Qty: 1 each, Refills: 0             Discharge Procedure Orders (must include Diet, Follow-up, Activity)   Discharge Procedure Orders (must include Diet, Follow-up, Activity)   Diet general     No dressing needed     Activity as tolerated        Discharge Date: No discharge date for patient encounter.

## 2019-08-06 NOTE — ANESTHESIA POSTPROCEDURE EVALUATION
Anesthesia Post Evaluation    Patient: Elizabeth Coreas    Procedure(s) Performed: Procedure(s) (LRB):  REPAIR, HERNIA, UMBILICAL, INCARCERATED (N/A)    Final Anesthesia Type: general  Patient location during evaluation: PACU  Patient participation: Yes- Able to Participate  Level of consciousness: awake and alert and oriented  Post-procedure vital signs: reviewed and stable  Pain management: adequate  Airway patency: patent  PONV status at discharge: No PONV  Anesthetic complications: no      Cardiovascular status: blood pressure returned to baseline and hemodynamically stable  Respiratory status: unassisted, spontaneous ventilation and room air  Hydration status: euvolemic  Follow-up not needed.          Vitals Value Taken Time   /66 8/6/2019  1:37 PM   Temp 36.1 °C (96.9 °F) 8/6/2019  1:37 PM   Pulse 51 8/6/2019  1:37 PM   Resp 20 8/6/2019  1:37 PM   SpO2 99 % 8/6/2019  1:37 PM         Event Time     Out of Recovery 13:29:34          Pain/Anibal Score: Anibal Score: 10 (8/6/2019  1:22 PM)

## 2019-08-06 NOTE — NURSING
Care assumed after bedside report received from Gregory GARDNER.  Patient transported to 3rd floor room 314D.  Patient in no distress.  AAOx3.  No complaints of any discomforts. Bed in low position.  Side rails up x 2.  Call bell in reach.  Will continue to monitor.

## 2019-08-06 NOTE — TRANSFER OF CARE
Anesthesia Transfer of Care Note    Patient: Elizabeth Coreas    Procedure(s) Performed: Procedure(s) (LRB):  REPAIR, HERNIA, UMBILICAL, INCARCERATED (N/A)    Patient location: PACU    Anesthesia Type: general    Transport from OR: Transported from OR on 6-10 L/min O2 by face mask with adequate spontaneous ventilation    Post pain: adequate analgesia    Post assessment: no apparent anesthetic complications and tolerated procedure well    Post vital signs: stable    Level of consciousness: sedated    Nausea/Vomiting: no nausea/vomiting    Complications: none    Transfer of care protocol was followed      Last vitals:   Visit Vitals  /66 (BP Location: Left arm, Patient Position: Lying)   Pulse (!) 51   Temp 36.1 °C (96.9 °F) (Oral)   Resp 20   SpO2 99%   Breastfeeding? No

## 2019-08-06 NOTE — INTERVAL H&P NOTE
The patient has been examined and the H&P has been reviewed:        I concur with the findings and no changes have occurred since H&P was written.        Patient cleared for Anesthesia: General        Anesthesia/Surgery risks, benefits and alternative options discussed and understood by patient/family.      Active Hospital Problems    Diagnosis  POA    Incarcerated umbilical hernia [K42.0]  Yes      Resolved Hospital Problems   No resolved problems to display.

## 2019-08-07 ENCOUNTER — TELEPHONE (OUTPATIENT)
Dept: PHARMACY | Facility: CLINIC | Age: 24
End: 2019-08-07

## 2019-08-13 ENCOUNTER — OFFICE VISIT (OUTPATIENT)
Dept: SURGERY | Facility: CLINIC | Age: 24
End: 2019-08-13
Payer: COMMERCIAL

## 2019-08-13 VITALS
SYSTOLIC BLOOD PRESSURE: 104 MMHG | OXYGEN SATURATION: 98 % | WEIGHT: 196.44 LBS | BODY MASS INDEX: 30.83 KG/M2 | DIASTOLIC BLOOD PRESSURE: 68 MMHG | HEART RATE: 80 BPM | HEIGHT: 67 IN

## 2019-08-13 DIAGNOSIS — K42.0 INCARCERATED UMBILICAL HERNIA: Primary | ICD-10-CM

## 2019-08-13 PROCEDURE — 99999 PR PBB SHADOW E&M-EST. PATIENT-LVL III: ICD-10-PCS | Mod: PBBFAC,,, | Performed by: SURGERY

## 2019-08-13 PROCEDURE — 99999 PR PBB SHADOW E&M-EST. PATIENT-LVL III: CPT | Mod: PBBFAC,,, | Performed by: SURGERY

## 2019-08-13 PROCEDURE — 99499 NO LOS: ICD-10-PCS | Mod: S$GLB,,, | Performed by: SURGERY

## 2019-08-13 PROCEDURE — 99499 UNLISTED E&M SERVICE: CPT | Mod: S$GLB,,, | Performed by: SURGERY

## 2019-08-13 NOTE — PROGRESS NOTES
Status post umbilical hernia repair last week.  She is postoperative day 7.  Today.  The hernia was repaired primarily without mesh.  1 cm fascial defect.  Patient reports no complaints.  There is some periumbilical bruising.  No drainage.  No redness.  Patient instructed no strenuous activity or heavy lifting for total of 6 weeks.  She can be discharged from surgery clinic in follow-up as needed.

## 2019-08-21 RX ORDER — RIZATRIPTAN BENZOATE 5 MG/1
TABLET ORAL
Qty: 9 TABLET | Refills: 0 | Status: SHIPPED | OUTPATIENT
Start: 2019-08-21 | End: 2019-10-16 | Stop reason: SDUPTHER

## 2019-08-21 NOTE — TELEPHONE ENCOUNTER
Elizabeth desire refill of maxalt. Last filled 7/17/19. Last office visit 2/2019.   Patient Active Problem List   Diagnosis    Migraine headache    Bronchitis    PFO (patent foramen ovale)    H/O sinus tachycardia    Menorrhagia with irregular cycle    Unwanted fertility    Pelvic pain in female    Dysmenorrhea    Endometriosis    Encounter for removal and reinsertion of IUD    Acute appendicitis    Incarcerated umbilical hernia     Prior to Admission medications    Medication Sig Start Date End Date Taking? Authorizing Provider   elagolix (ORILISSA) 150 mg Tab Take 1 tablet (150 mg) by mouth once daily. 8/2/19   Julia Fagan MD   etonogestrel (NEXPLANON) 68 mg Impl subdermal device 68 mg by Subdermal route once. for 1 dose 6/17/19 7/29/19  Julia Fagan MD   HYDROcodone-acetaminophen (NORCO) 5-325 mg per tablet Take 1 tablet by mouth every 6 (six) hours as needed for pain. 8/6/19   Lane Her Jr., MD   prochlorperazine (COMPAZINE) 10 MG tablet Take 1 tablet (10 mg total) by mouth daily as needed. 2/7/19   Scar Bustos MD   propranolol (INDERAL LA) 80 MG 24 hr capsule TAKE 1 CAPSULE BY MOUTH DAILY 3/8/19      rizatriptan (MAXALT) 5 MG tablet TAKE ONE DAILY AS NEEDED FOR HEADACHE. MAY REPEAT DOSE ONCE AFTER 2 HOURS IF NEEDED. 7/17/19   Yrn Billings MD

## 2019-09-09 ENCOUNTER — PATIENT MESSAGE (OUTPATIENT)
Dept: OBSTETRICS AND GYNECOLOGY | Facility: CLINIC | Age: 24
End: 2019-09-09

## 2019-09-17 ENCOUNTER — TELEPHONE (OUTPATIENT)
Dept: NEUROLOGY | Facility: CLINIC | Age: 24
End: 2019-09-17

## 2019-09-17 NOTE — TELEPHONE ENCOUNTER
----- Message from Sarita Hopson sent at 2019 11:07 AM CDT -----  Contact: my chart  Elizabeth Coreas, 24 yrs, None  MRN:   [id number]  ::   1995  Lang:   English  Last BMI:   None  Pt Alexander Status:   Elapsed  Prim Cvg::   BLUE CROSS [employer] EMPLOYEE BENEFIT/BLUE CROSS [employer] EMPLOYEE  Cvg Last Verified Date:   2019  Patient Types:   None  PCP:   Bisi Gottlieb MD  Care Team:     Allergies:   Doxycycline (Reaction: Other (See Comments))  Patient Portal:   Active, [employer], 2019 3:09 PM  FYI  None   More Detail >>  Appointment Request  Elizabeth Ayalajudith  Sent:   3:13 PM  To: [facility]     Elizabeth Coreas  MRN: [id number] : 1995  Pt Home: [phone]    Entered: [phone]      Message     Appointment Request From: Elizabeth Coreas    With Provider: Cristal Mckeon    Preferred Date Range: Any    Preferred Times: Any time    Reason for visit: Migraines/ Get established    Comments:  Migraine

## 2019-09-17 NOTE — TELEPHONE ENCOUNTER
----- Message from Génesis Mazariegos sent at 9/17/2019  1:17 PM CDT -----  Contact: Self  Type:  Patient Returning Call    Who Called:  Patient   Who Left Message for Patient:  Traci   Does the patient know what this is regarding?:  Scheduling an appt  Best Call Back Number:  [phone] (home)   Additional Information:  na

## 2019-09-27 ENCOUNTER — OFFICE VISIT (OUTPATIENT)
Dept: OBSTETRICS AND GYNECOLOGY | Facility: CLINIC | Age: 24
End: 2019-09-27
Payer: MEDICAID

## 2019-09-27 VITALS
BODY MASS INDEX: 30.34 KG/M2 | DIASTOLIC BLOOD PRESSURE: 76 MMHG | WEIGHT: 193.31 LBS | HEIGHT: 67 IN | SYSTOLIC BLOOD PRESSURE: 114 MMHG

## 2019-09-27 DIAGNOSIS — N80.9 ENDOMETRIOSIS: Primary | ICD-10-CM

## 2019-09-27 DIAGNOSIS — N94.6 DYSMENORRHEA: ICD-10-CM

## 2019-09-27 PROCEDURE — 99213 PR OFFICE/OUTPT VISIT, EST, LEVL III, 20-29 MIN: ICD-10-PCS | Mod: S$PBB,,, | Performed by: OBSTETRICS & GYNECOLOGY

## 2019-09-27 PROCEDURE — 99999 PR PBB SHADOW E&M-EST. PATIENT-LVL IV: ICD-10-PCS | Mod: PBBFAC,,, | Performed by: OBSTETRICS & GYNECOLOGY

## 2019-09-27 PROCEDURE — 99213 OFFICE O/P EST LOW 20 MIN: CPT | Mod: S$PBB,,, | Performed by: OBSTETRICS & GYNECOLOGY

## 2019-09-27 PROCEDURE — 99214 OFFICE O/P EST MOD 30 MIN: CPT | Mod: PBBFAC | Performed by: OBSTETRICS & GYNECOLOGY

## 2019-09-27 PROCEDURE — 99999 PR PBB SHADOW E&M-EST. PATIENT-LVL IV: CPT | Mod: PBBFAC,,, | Performed by: OBSTETRICS & GYNECOLOGY

## 2019-09-27 NOTE — PROGRESS NOTES
Gynecology    SUBJECTIVE:     Chief Complaint: Consult       History of Present Illness:  24 year old with known endometriosis.  She was started on orlissa 6 weeks ago.  Reports that is helping with her pain.  However, she initially started on the orlissa because she didn't have time for the hysterectomy, and now she's no longer working and would like to pursue this.  See previous note regarding pain.    Previous surgeries:   Laparoscopic appendectomy (not ruptured prior to surgery)  Diagnostic laparoscopy for pelvic pain and BTL  Umbilical hernia repair 8/2019, no mesh used    Review of Systems:  Review of Systems   Constitutional: Negative for appetite change, fever and unexpected weight change.   Respiratory: Negative for shortness of breath.    Cardiovascular: Negative for chest pain.   Gastrointestinal: Negative for nausea and vomiting.   Genitourinary: Positive for dysmenorrhea and pelvic pain. Negative for menorrhagia, menstrual problem, vaginal bleeding, vaginal discharge and vaginal pain.        OBJECTIVE:     Physical Exam:  Physical Exam   Constitutional: She is oriented to person, place, and time. She appears well-developed and well-nourished.   Pulmonary/Chest: Effort normal.   Neurological: She is oriented to person, place, and time.   Skin: No pallor.   Psychiatric: She has a normal mood and affect. Her behavior is normal. Judgment and thought content normal.   Nursing note and vitals reviewed.      ASSESSMENT:       ICD-10-CM ICD-9-CM    1. Endometriosis N80.9 617.9 Case Request Operating Room: HYSTERECTOMY, TOTAL, LAPAROSCOPIC      Type & Screen      CBC auto differential      Ambulatory consult to Anesthesiology   2. Dysmenorrhea N94.6 625.3 Case Request Operating Room: HYSTERECTOMY, TOTAL, LAPAROSCOPIC      Type & Screen      CBC auto differential      Ambulatory consult to Anesthesiology          Plan:      Elizabeth was seen today for consult.    Diagnoses and all orders for this  visit:    Endometriosis  -     Case Request Operating Room: HYSTERECTOMY, TOTAL, LAPAROSCOPIC  -     Type & Screen; Future  -     CBC auto differential; Future  -     Ambulatory consult to Anesthesiology    Dysmenorrhea  -     Case Request Operating Room: HYSTERECTOMY, TOTAL, LAPAROSCOPIC  -     Type & Screen; Future  -     CBC auto differential; Future  -     Ambulatory consult to Anesthesiology    - plan for surgery on 11/12  - RTC for preop    Orders Placed This Encounter   Procedures    CBC auto differential    Ambulatory consult to Anesthesiology    Type & Screen       Follow up for preop.    Julia Fagan

## 2019-10-03 ENCOUNTER — TELEPHONE (OUTPATIENT)
Dept: OBSTETRICS AND GYNECOLOGY | Facility: CLINIC | Age: 24
End: 2019-10-03

## 2019-10-03 NOTE — TELEPHONE ENCOUNTER
Called patient to see if she can have her Hysterectomy done on 11/12. Patient said yes and verbalized understanding.

## 2019-10-16 ENCOUNTER — PATIENT MESSAGE (OUTPATIENT)
Dept: NEUROLOGY | Facility: CLINIC | Age: 24
End: 2019-10-16

## 2019-10-17 ENCOUNTER — PATIENT MESSAGE (OUTPATIENT)
Dept: SURGERY | Facility: OTHER | Age: 24
End: 2019-10-17

## 2019-10-17 RX ORDER — RIZATRIPTAN BENZOATE 5 MG/1
TABLET ORAL
Qty: 9 TABLET | Refills: 0 | Status: SHIPPED | OUTPATIENT
Start: 2019-10-17 | End: 2019-10-22 | Stop reason: SDUPTHER

## 2019-10-21 ENCOUNTER — PATIENT MESSAGE (OUTPATIENT)
Dept: OBSTETRICS AND GYNECOLOGY | Facility: CLINIC | Age: 24
End: 2019-10-21

## 2019-10-22 RX ORDER — RIZATRIPTAN BENZOATE 5 MG/1
TABLET ORAL
Qty: 9 TABLET | Refills: 0 | Status: SHIPPED | OUTPATIENT
Start: 2019-10-22 | End: 2020-01-28 | Stop reason: SDUPTHER

## 2019-10-22 NOTE — TELEPHONE ENCOUNTER
----- Message from Jigna Luke sent at 10/22/2019  4:16 PM CDT -----  Contact: self  Elizabeth Piña  MRN: [id number]  : 1995  PCP: Bisi Gottlieb  Home Phone      [phone]  Work Phone      Not on file.  Mobile          [phone]  Mobile          [phone]      MESSAGE:  Pt is needing a refill on MAXALT) 5 MG tablet. Please advise, thanks.  Pharmacy: [facility] - [facility] [address] [place] [address]  Phone: [phone]

## 2019-10-29 ENCOUNTER — OFFICE VISIT (OUTPATIENT)
Dept: OBSTETRICS AND GYNECOLOGY | Facility: CLINIC | Age: 24
End: 2019-10-29
Payer: MEDICAID

## 2019-10-29 ENCOUNTER — HOSPITAL ENCOUNTER (OUTPATIENT)
Dept: PREADMISSION TESTING | Facility: OTHER | Age: 24
Discharge: HOME | End: 2019-10-29
Attending: OBSTETRICS & GYNECOLOGY
Payer: MEDICAID

## 2019-10-29 ENCOUNTER — ANESTHESIA EVENT (OUTPATIENT)
Dept: SURGERY | Facility: OTHER | Age: 24
End: 2019-10-29
Payer: MEDICAID

## 2019-10-29 VITALS
WEIGHT: 201.06 LBS | BODY MASS INDEX: 31.56 KG/M2 | SYSTOLIC BLOOD PRESSURE: 110 MMHG | DIASTOLIC BLOOD PRESSURE: 64 MMHG | HEIGHT: 67 IN

## 2019-10-29 VITALS
TEMPERATURE: 98 F | OXYGEN SATURATION: 98 % | WEIGHT: 201 LBS | HEIGHT: 67 IN | SYSTOLIC BLOOD PRESSURE: 115 MMHG | DIASTOLIC BLOOD PRESSURE: 73 MMHG | RESPIRATION RATE: 16 BRPM | BODY MASS INDEX: 31.55 KG/M2 | HEART RATE: 72 BPM

## 2019-10-29 DIAGNOSIS — N80.9 ENDOMETRIOSIS: ICD-10-CM

## 2019-10-29 DIAGNOSIS — Z12.4 SCREENING FOR CERVICAL CANCER: Primary | ICD-10-CM

## 2019-10-29 DIAGNOSIS — N94.6 DYSMENORRHEA: ICD-10-CM

## 2019-10-29 LAB
ABO + RH BLD: NORMAL
BASOPHILS # BLD AUTO: 0.02 K/UL (ref 0–0.2)
BASOPHILS NFR BLD: 0.3 % (ref 0–1.9)
BLD GP AB SCN CELLS X3 SERPL QL: NORMAL
DIFFERENTIAL METHOD: ABNORMAL
EOSINOPHIL # BLD AUTO: 0.1 K/UL (ref 0–0.5)
EOSINOPHIL NFR BLD: 1.3 % (ref 0–8)
ERYTHROCYTE [DISTWIDTH] IN BLOOD BY AUTOMATED COUNT: 13.2 % (ref 11.5–14.5)
HCT VFR BLD AUTO: 40.2 % (ref 37–48.5)
HGB BLD-MCNC: 12.9 G/DL (ref 12–16)
IMM GRANULOCYTES # BLD AUTO: 0.04 K/UL (ref 0–0.04)
IMM GRANULOCYTES NFR BLD AUTO: 0.6 % (ref 0–0.5)
LYMPHOCYTES # BLD AUTO: 2.4 K/UL (ref 1–4.8)
LYMPHOCYTES NFR BLD: 35.1 % (ref 18–48)
MCH RBC QN AUTO: 28 PG (ref 27–31)
MCHC RBC AUTO-ENTMCNC: 32.1 G/DL (ref 32–36)
MCV RBC AUTO: 87 FL (ref 82–98)
MONOCYTES # BLD AUTO: 0.6 K/UL (ref 0.3–1)
MONOCYTES NFR BLD: 9.3 % (ref 4–15)
NEUTROPHILS # BLD AUTO: 3.6 K/UL (ref 1.8–7.7)
NEUTROPHILS NFR BLD: 53.4 % (ref 38–73)
NRBC BLD-RTO: 0 /100 WBC
PLATELET # BLD AUTO: 259 K/UL (ref 150–350)
PMV BLD AUTO: 9.6 FL (ref 9.2–12.9)
RBC # BLD AUTO: 4.61 M/UL (ref 4–5.4)
WBC # BLD AUTO: 6.79 K/UL (ref 3.9–12.7)

## 2019-10-29 PROCEDURE — 99999 PR PBB SHADOW E&M-EST. PATIENT-LVL III: ICD-10-PCS | Mod: PBBFAC,,, | Performed by: OBSTETRICS & GYNECOLOGY

## 2019-10-29 PROCEDURE — 99213 OFFICE O/P EST LOW 20 MIN: CPT | Mod: PBBFAC,25 | Performed by: OBSTETRICS & GYNECOLOGY

## 2019-10-29 PROCEDURE — 86901 BLOOD TYPING SEROLOGIC RH(D): CPT

## 2019-10-29 PROCEDURE — 99999 PR PBB SHADOW E&M-EST. PATIENT-LVL III: CPT | Mod: PBBFAC,,, | Performed by: OBSTETRICS & GYNECOLOGY

## 2019-10-29 PROCEDURE — 88175 CYTOPATH C/V AUTO FLUID REDO: CPT

## 2019-10-29 PROCEDURE — 85025 COMPLETE CBC W/AUTO DIFF WBC: CPT

## 2019-10-29 PROCEDURE — 99499 NO LOS: ICD-10-PCS | Mod: S$PBB,,, | Performed by: OBSTETRICS & GYNECOLOGY

## 2019-10-29 PROCEDURE — 36415 COLL VENOUS BLD VENIPUNCTURE: CPT

## 2019-10-29 PROCEDURE — 99499 UNLISTED E&M SERVICE: CPT | Mod: S$PBB,,, | Performed by: OBSTETRICS & GYNECOLOGY

## 2019-10-29 RX ORDER — CELECOXIB 200 MG/1
400 CAPSULE ORAL
Status: CANCELLED | OUTPATIENT
Start: 2019-10-29 | End: 2019-10-29

## 2019-10-29 RX ORDER — MIDAZOLAM HYDROCHLORIDE 1 MG/ML
2 INJECTION INTRAMUSCULAR; INTRAVENOUS
Status: CANCELLED | OUTPATIENT
Start: 2019-10-29 | End: 2019-10-29

## 2019-10-29 RX ORDER — PREGABALIN 75 MG/1
75 CAPSULE ORAL
Status: CANCELLED | OUTPATIENT
Start: 2019-10-29 | End: 2019-10-29

## 2019-10-29 RX ORDER — ACETAMINOPHEN 500 MG
1000 TABLET ORAL
Status: CANCELLED | OUTPATIENT
Start: 2019-10-29 | End: 2019-10-29

## 2019-10-29 RX ORDER — LIDOCAINE HYDROCHLORIDE 10 MG/ML
0.5 INJECTION, SOLUTION EPIDURAL; INFILTRATION; INTRACAUDAL; PERINEURAL ONCE
Status: CANCELLED | OUTPATIENT
Start: 2019-10-29 | End: 2019-10-29

## 2019-10-29 RX ORDER — SODIUM CHLORIDE, SODIUM LACTATE, POTASSIUM CHLORIDE, CALCIUM CHLORIDE 600; 310; 30; 20 MG/100ML; MG/100ML; MG/100ML; MG/100ML
INJECTION, SOLUTION INTRAVENOUS CONTINUOUS
Status: CANCELLED | OUTPATIENT
Start: 2019-10-29

## 2019-10-29 NOTE — ANESTHESIA PREPROCEDURE EVALUATION
10/29/2019  Elizabeth Piña is a 24 y.o., female.    Anesthesia Evaluation    I have reviewed the Patient Summary Reports.    I have reviewed the Nursing Notes.   I have reviewed the Medications.     Review of Systems  Anesthesia Hx:  No problems with previous Anesthesia  History of prior surgery of interest to airway management or planning: Denies Family Hx of Anesthesia complications.   Denies Personal Hx of Anesthesia complications.   Social:  Non-Smoker    Hematology/Oncology:  Hematology Normal   Oncology Normal     EENT/Dental:EENT/Dental Normal   Cardiovascular:   Exercise tolerance: good Dysrhythmias Frequent runs of abnormal tachy !!Will have Echo and stress test shauna in [place],will forward results to us   Pulmonary:  Pulmonary Normal    Renal/:  Renal/ Normal     Hepatic/GI:  Hepatic/GI Normal    Musculoskeletal:  Musculoskeletal Normal    Neurological:   Headaches    Endocrine:  Endocrine Normal    Dermatological:  Skin Normal    Psych:  Psychiatric Normal           Physical Exam  General:  Obesity    Airway/Jaw/Neck:  Airway Findings: Mouth Opening: Normal Tongue: Normal  Mallampati: I      Dental:  Dental Findings: In tact        Mental Status:  Mental Status Findings:  Cooperative, Alert and Oriented         Anesthesia Plan  Type of Anesthesia, risks & benefits discussed:  Anesthesia Type:  general  Patient's Preference:   Intra-op Monitoring Plan:   Intra-op Monitoring Plan Comments:   Post Op Pain Control Plan: multimodal analgesia and per primary service following discharge from PACU  Post Op Pain Control Plan Comments:   Induction:   IV  Beta Blocker:         Informed Consent: Patient understands risks and agrees with Anesthesia plan.  Questions answered. Anesthesia consent signed with patient.  ASA Score: 2     Day of Surgery Review of History & Physical:    H&P update referred to  the surgeon.     Anesthesia Plan Notes: Cleared by her cardiologist Dr Dover.        Ready For Surgery From Anesthesia Perspective.

## 2019-10-29 NOTE — DISCHARGE INSTRUCTIONS
PRE-ADMIT TESTING -  [phone]    [address]  [place] [facility]          Your surgery has been scheduled at [facility]. We are pleased to have the opportunity to serve you. For Further Information please call [phone].    On the day of surgery please report to the Information Desk on the 1st floor.    · CONTACT YOUR PHYSICIAN'S OFFICE THE DAY PRIOR TO YOUR SURGERY TO OBTAIN YOUR ARRIVAL TIME.     · The evening before surgery do not eat anything after 9 p.m. ( this includes hard candy, chewing gum and mints).  You may only have GATORADE, POWERADE AND WATER  from 9 p.m. until you leave your home.   DO NOT DRINK ANY LIQUIDS ON THE WAY TO THE HOSPITAL.      SPECIAL MEDICATION INSTRUCTIONS: TAKE medications checked off by the Anesthesiologist on your Medication List.    Angiogram Patients: Take medications as instructed by your physician, including aspirin.     Surgery Patients:    If you take ASPIRIN - Your PHYSICIAN/SURGEON will need to inform you IF/OR when you need to stop taking aspirin prior to your surgery.     Do Not take any medications containing IBUPROFEN.  Do Not Wear any make-up or dark nail polish   (especially eye make-up) to surgery. If you come to surgery with makeup on you will be required to remove the makeup or nail polish.    Do not shave your surgical area at least 5 days prior to your surgery. The surgical prep will be performed at the hospital according to Infection Control regulations.    Leave all valuables at home.   Do Not wear any jewelry or watches, including any metal in body piercings. Jewelry must be removed prior to coming to the hospital.  There is a possibility that rings that are unable to be removed may be cut off if they are on the surgical extremity.    Contact Lens must be removed before surgery. Either do not wear the contact lens or bring a case and solution for storage.  Please bring a container for eyeglasses or dentures as required.  Bring  any paperwork your physician has provided, such as consent forms,  history and physicals, doctor's orders, etc.   Bring comfortable clothes that are loose fitting to wear upon discharge. Take into consideration the type of surgery being performed.  Maintain your diet as advised per your physician the day prior to surgery.      Adequate rest the night before surgery is advised.   Park in the Parking lot behind the hospital or in the [facility] across the street from the parking lot. Parking is complimentary.  If you will be discharged the same day as your procedure, please arrange for a responsible adult to drive you home or to accompany you if traveling by taxi.   YOU WILL NOT BE PERMITTED TO DRIVE OR TO LEAVE THE HOSPITAL ALONE AFTER SURGERY.   It is strongly recommended that you arrange for someone to remain with you for the first 24 hrs following your surgery.    The Surgeon will speak to your family/visitor after your surgery regarding the outcome of your surgery and post op care.  The Surgeon may speak to you after your surgery, but there is a possibility you may not remember the details.  Please check with your family members regarding the conversation with the Surgeon.    We strongly recommend whoever is bringing you home be present for discharge instructions.  This will ensure a thorough understanding for your post op home care.      Thank you for your cooperation.  The Staff of [facility].                Bathing Instructions with Hibiclens     Shower the evening before and morning of your procedure with Hibiclens:   Wash your face with water and your regular face wash/soap   Apply Hibiclens directly on your skin or on a wet washcloth and wash gently. When showering: Move away from the shower stream when applying Hibiclens to avoid rinsing off too soon.   Rinse thoroughly with warm water   Do not dilute Hibiclens         Dry off as usual, do not use any deodorant,  powder, body lotions, perfume, after shave or cologne.

## 2019-10-29 NOTE — PROGRESS NOTES
Gynecology    SUBJECTIVE:     Chief Complaint: Pre-op Exam       History of Present Illness:  {OBG HPI BLOCKS:19549}    Review of Systems:  Review of Systems     OBJECTIVE:     Physical Exam:  Physical Exam    Chaperoned by: ***    ASSESSMENT:     No diagnosis found.       Plan:      There are no diagnoses linked to this encounter.    No orders of the defined types were placed in this encounter.      No follow-ups on file.    Julia Fagan

## 2019-10-31 NOTE — PROGRESS NOTES
Gynecology    SUBJECTIVE:   Chief Complaint: Pre-op Exam       History of Present Illness:  Ms. Piña presents to sign consents for her scheduled TLH/BS on 11/12 for endometriosis..      Previous HPI:   History of Present Illness:  24 year old with known endometriosis.  She was started on orlissa 6 weeks ago.  Reports that is helping with her pain.  However, she initially started on the orlissa because she didn't have time for the hysterectomy, and now she's no longer working and would like to pursue this.  See previous note regarding pain.     Previous surgeries:   Diagnostic laparoscopy with extensive fulguration of endometriosis, D&C/ablation with novasure device and IUD placement (has since been removed)  Laparoscopic appendectomy (not ruptured prior to surgery)   Umbilical hernia repair 8/2019, no mesh used    Imaging:   Uterus  ======  Uterus: Visualized  Uterus details: IUD visualized at lower uterine segment  Uterus long 7.9 cm  Uterus ap 4.0 cm  Uterus tr 5.2 cm  Uterus vol 86.0 cmÂ³    Right Ovary  =========  Rt ovary: Visualized  Rt ovary D1 4.5 cm  Rt ovary D2 4.5 cm  Rt ovary D3 4.3 cm  Rt ovary mean 4.4 cm  Rt ovary vol 45.3 cmÂ³  Rt ovarian cyst(s): Cysts identified  Findings: Simple cyst  D1 40.0 mm  D2 28.8 mm  D3 37.8 mm  Mean 35.5 mm  Vol 22.800 cmÂ³  Findings: Simple cyst  D1 35.1 mm  D2 28.9 mm  D3 23.1 mm  Mean 29.0 mm  Vol 12.269 cmÂ³    Left Ovary  ========  Lt ovary: Normal  Lt ovary D1 2.0 cm  Lt ovary D2 2.4 cm  Lt ovary D3 1.8 cm  Lt ovary mean 2.1 cm  Lt ovary vol 4.6 cmÂ³  Lt ovarian cyst(s): No cysts identified    Cul de Sac  =========  Free fluid visualized (mild).    Impression  =========  IUD present intrauterine but appears to be in the lower uterine segment and not all the way at the fundus.  Right ovary with 2 simple cysts measuring 4 cm and 3.5 cm. A minimal amount of surrounding fluid could represent recent rupture.  Left ovary normal.  Mild amount of free fluid in cul de  sac.    Previous pap smear: done today    Review of patient's allergies indicates:   Allergen Reactions    Doxycycline Other (See Comments)     Yeast infection       Past Medical History:   Diagnosis Date    Elevated cholesterol     Heart disorder     hole in heart    Migraine headache     Migraines     Murmur     Tachycardia      Past Surgical History:   Procedure Laterality Date    DIAGNOSTIC LAPAROSCOPY N/A 6/1/2018    Procedure: LAPAROSCOPY-DIAGNOSTIC;  Surgeon: Olivia Aguillon MD;  Location: [facility];  Service: OB/GYN;  Laterality: N/A;    DILATION AND CURETTAGE OF UTERUS N/A 6/1/2018    Procedure: DILATION-CURETTAGE OF UTERUS (D AND C);  Surgeon: Olivia Aguillon MD;  Location: [facility] OR;  Service: OB/GYN;  Laterality: N/A;    FULGURATION OF ENDOMETRIOSIS N/A 6/1/2018    Procedure: FULGURATION, ENDOMETRIOSIS;  Surgeon: Olivia Aguillon MD;  Location: [facility] OR;  Service: OB/GYN;  Laterality: N/A;    INTRAUTERINE DEVICE INSERTION N/A 6/1/2018    Procedure: PLACEMENT INTRAUTERINE DEVICE (IUD);  Surgeon: Olivia Aguillon MD;  Location: [facility] OR;  Service: OB/GYN;  Laterality: N/A;    LAPAROSCOPIC APPENDECTOMY Right 6/21/2019    Procedure: APPENDECTOMY, LAPAROSCOPIC;  Surgeon: Lane Her Jr., MD;  Location: [facility] OR;  Service: General;  Laterality: Right;    LAPAROSCOPIC SURGICAL REMOVAL OF CYST OF OVARY Left 6/1/2018    Procedure: EXCISION, CYST, OVARY, LAPAROSCOPIC;  Surgeon: Olivia Aguillon MD;  Location: [facility] OR;  Service: OB/GYN;  Laterality: Left;    LYSIS OF ADHESIONS N/A 6/1/2018    Procedure: LYSIS, ADHESIONS;  Surgeon: Olivia Aguillon MD;  Location: [facility] OR;  Service: OB/GYN;  Laterality: N/A;    REMOVAL OF INTRAUTERINE DEVICE (IUD) N/A 6/1/2018    Procedure: REMOVAL, INTRAUTERINE DEVICE;  Surgeon: Olivia Aguillon MD;  Location: [facility] OR;  Service: OB/GYN;  Laterality: N/A;    REPAIR OF INCARCERATED UMBILICAL HERNIA N/A 8/6/2019    Procedure: REPAIR, HERNIA, UMBILICAL, INCARCERATED;  Surgeon:  Lane Her Jr., MD;  Location: [facility] OR;  Service: General;  Laterality: N/A;    THERMAL ABLATION OF ENDOMETRIUM USING HYSTEROSCOPY N/A 2018    Procedure: ABLATION ENDOMETRIAL THERMAL - NOVASURE;  Surgeon: Olivia Aguillon MD;  Location: [facility] OR;  Service: OB/GYN;  Laterality: N/A;    WISDOM TOOTH EXTRACTION  10/2012     OB History        2    Para   2    Term   2            AB        Living   2       SAB        TAB        Ectopic        Multiple        Live Births   2               Family History   Problem Relation Age of Onset    Diabetes Mother     Hyperlipidemia Mother     Hypertension Father     Hyperlipidemia Father     Cerebral palsy Brother         half brother; dad's side    Heart disease Maternal Grandmother     Diabetes Maternal Grandmother     Heart disease Maternal Grandfather     Kidney disease Maternal Grandfather     Heart disease Paternal Grandmother     Heart disease Paternal Grandfather     Breast cancer Neg Hx     Colon cancer Neg Hx     Ovarian cancer Neg Hx      Social History     Tobacco Use    Smoking status: Never Smoker    Smokeless tobacco: Never Used   Substance Use Topics    Alcohol use: Yes     Comment: occ    Drug use: No       Current Outpatient Medications   Medication Sig    elagolix (ORILISSA) 150 mg Tab Take 1 tablet (150 mg) by mouth once daily.    propranolol (INDERAL LA) 80 MG 24 hr capsule TAKE 1 CAPSULE BY MOUTH DAILY    rizatriptan (MAXALT) 5 MG tablet TAKE ONE DAILY AS NEEDED FOR HEADACHE. MAY REPEAT DOSE ONCE AFTER 2 HOURS IF NEEDED.    etonogestrel (NEXPLANON) 68 mg Impl subdermal device 68 mg by Subdermal route once. for 1 dose     Current Facility-Administered Medications   Medication    etonogestrel subdermal device 68 mg       Review of Systems:  Review of Systems   Genitourinary: Positive for pelvic pain. Negative for menorrhagia, menstrual problem, vaginal bleeding, vaginal discharge, vaginal pain and vaginal odor.         OBJECTIVE:   Physical Exam:  Physical Exam   Constitutional: She is oriented to person, place, and time. She appears well-developed and well-nourished.   Pulmonary/Chest: Effort normal.   Abdominal: Soft.   Genitourinary: Vagina normal and uterus normal. No labial fusion. There is no rash, tenderness, lesion or injury on the right labia. There is no rash, tenderness, lesion or injury on the left labia. Uterus is not deviated, not enlarged, not fixed and not tender. Cervix exhibits no motion tenderness, no discharge and no friability. Right adnexum displays no mass, no tenderness and no fullness. Left adnexum displays no mass, no tenderness and no fullness. No erythema, tenderness or bleeding in the vagina. No foreign body in the vagina. No signs of injury around the vagina. No vaginal discharge found.   Genitourinary Comments: Urethra: normal appearing urethra with no masses, tenderness or lesions  Urethral meatus: normal size, anterior vaginal wall with no prolapse, no lesions  Uterus small with good mobility   Neurological: She is alert and oriented to person, place, and time.   Psychiatric: She has a normal mood and affect. Her behavior is normal. Judgment and thought content normal.   Nursing note and vitals reviewed.        ASSESSMENT:       ICD-10-CM ICD-9-CM    1. Screening for cervical cancer Z12.4 V76.2 Liquid-based pap smear, screening   2. Endometriosis N80.9 617.9           Plan:      Elizabeth was seen today for pre-op exam.    Diagnoses and all orders for this visit:    Screening for cervical cancer  -     Liquid-based pap smear, screening    Endometriosis        No orders of the defined types were placed in this encounter.      - Work up today for TLH/BS on 11/12  - Preadmit scheduled for today after this appointment  - Last pap: done today  - labs to be done with anesthesia  - Consents signed for procedure, risks and benefits reviewed.    - Counseling: planned procedure discussed with patient; risks  reviewed as well including but not limited to pain, infection, bleeding; injury to neighboring organs- especially high risk due to history of extensive endometriosis as well as multiple abdominal surgeries; discussed injury to bowel, bladder, ureter, nerves, blood vessels, ovaries, etc.  Discussed risk of needing to convert to abdominal surgery if extensive adhesive disease; all questions answered.      Follow up for post op appointment.    Julia Fagan

## 2019-11-11 ENCOUNTER — TELEPHONE (OUTPATIENT)
Dept: OBSTETRICS AND GYNECOLOGY | Facility: CLINIC | Age: 24
End: 2019-11-11

## 2019-11-11 NOTE — TELEPHONE ENCOUNTER
Attempted to call pt. No answer. Voicemail box not set up.     Pt needs to arrive for surgery at 8am tomorrow 11/12/19

## 2019-11-12 ENCOUNTER — TELEPHONE (OUTPATIENT)
Dept: SURGERY | Facility: OTHER | Age: 24
End: 2019-11-12

## 2019-11-12 DIAGNOSIS — N80.9 ENDOMETRIOSIS DETERMINED BY LAPAROSCOPY: Primary | ICD-10-CM

## 2019-11-12 NOTE — TELEPHONE ENCOUNTER
Case request placed for 12/10.  Case cancelled for 11/12 due to no approval from insurance yet.    Julia Fagan

## 2019-11-13 ENCOUNTER — PATIENT MESSAGE (OUTPATIENT)
Dept: SURGERY | Facility: OTHER | Age: 24
End: 2019-11-13

## 2019-11-18 ENCOUNTER — PATIENT MESSAGE (OUTPATIENT)
Dept: SURGERY | Facility: OTHER | Age: 24
End: 2019-11-18

## 2019-12-09 ENCOUNTER — TELEPHONE (OUTPATIENT)
Dept: OBSTETRICS AND GYNECOLOGY | Facility: CLINIC | Age: 24
End: 2019-12-09

## 2019-12-09 ENCOUNTER — PATIENT MESSAGE (OUTPATIENT)
Dept: OBSTETRICS AND GYNECOLOGY | Facility: CLINIC | Age: 24
End: 2019-12-09

## 2019-12-09 DIAGNOSIS — Z01.818 PRE-OP EXAM: ICD-10-CM

## 2019-12-09 DIAGNOSIS — N94.6 DYSMENORRHEA: Primary | ICD-10-CM

## 2019-12-09 DIAGNOSIS — N80.9 ENDOMETRIOSIS: ICD-10-CM

## 2019-12-09 RX ORDER — SODIUM CHLORIDE 9 MG/ML
INJECTION, SOLUTION INTRAVENOUS CONTINUOUS
Status: CANCELLED | OUTPATIENT
Start: 2019-12-09

## 2019-12-09 NOTE — TELEPHONE ENCOUNTER
Called patient to make sure she knows what time to show up for her surgery on tomorrow. Could not leave voicemail. Will try to call later.

## 2019-12-10 ENCOUNTER — ANESTHESIA (OUTPATIENT)
Dept: SURGERY | Facility: OTHER | Age: 24
End: 2019-12-10
Payer: MEDICAID

## 2019-12-10 ENCOUNTER — HOSPITAL ENCOUNTER (OUTPATIENT)
Facility: OTHER | Age: 24
Discharge: HOME | End: 2019-12-10
Attending: OBSTETRICS & GYNECOLOGY | Admitting: OBSTETRICS & GYNECOLOGY
Payer: MEDICAID

## 2019-12-10 VITALS
OXYGEN SATURATION: 96 % | RESPIRATION RATE: 16 BRPM | BODY MASS INDEX: 31.56 KG/M2 | TEMPERATURE: 98 F | WEIGHT: 201.06 LBS | DIASTOLIC BLOOD PRESSURE: 60 MMHG | HEIGHT: 67 IN | HEART RATE: 80 BPM | SYSTOLIC BLOOD PRESSURE: 101 MMHG

## 2019-12-10 DIAGNOSIS — N94.6 DYSMENORRHEA: ICD-10-CM

## 2019-12-10 DIAGNOSIS — N80.9 ENDOMETRIOSIS: ICD-10-CM

## 2019-12-10 DIAGNOSIS — Z01.818 PRE-OP EXAM: ICD-10-CM

## 2019-12-10 PROBLEM — Z90.710 S/P LAPAROSCOPIC HYSTERECTOMY: Status: ACTIVE | Noted: 2019-12-10

## 2019-12-10 LAB
ABO + RH BLD: NORMAL
B-HCG UR QL: NEGATIVE
BASOPHILS # BLD AUTO: 0.02 K/UL (ref 0–0.2)
BASOPHILS NFR BLD: 0.4 % (ref 0–1.9)
BLD GP AB SCN CELLS X3 SERPL QL: NORMAL
CTP QC/QA: YES
DIFFERENTIAL METHOD: NORMAL
EOSINOPHIL # BLD AUTO: 0.1 K/UL (ref 0–0.5)
EOSINOPHIL NFR BLD: 1.1 % (ref 0–8)
ERYTHROCYTE [DISTWIDTH] IN BLOOD BY AUTOMATED COUNT: 12.3 % (ref 11.5–14.5)
HCT VFR BLD AUTO: 38 % (ref 37–48.5)
HGB BLD-MCNC: 12.3 G/DL (ref 12–16)
IMM GRANULOCYTES # BLD AUTO: 0.02 K/UL (ref 0–0.04)
IMM GRANULOCYTES NFR BLD AUTO: 0.4 % (ref 0–0.5)
LYMPHOCYTES # BLD AUTO: 1.7 K/UL (ref 1–4.8)
LYMPHOCYTES NFR BLD: 31.1 % (ref 18–48)
MCH RBC QN AUTO: 28.2 PG (ref 27–31)
MCHC RBC AUTO-ENTMCNC: 32.4 G/DL (ref 32–36)
MCV RBC AUTO: 87 FL (ref 82–98)
MONOCYTES # BLD AUTO: 0.4 K/UL (ref 0.3–1)
MONOCYTES NFR BLD: 7 % (ref 4–15)
NEUTROPHILS # BLD AUTO: 3.3 K/UL (ref 1.8–7.7)
NEUTROPHILS NFR BLD: 60 % (ref 38–73)
NRBC BLD-RTO: 0 /100 WBC
PLATELET # BLD AUTO: 190 K/UL (ref 150–350)
PMV BLD AUTO: 9.8 FL (ref 9.2–12.9)
POCT GLUCOSE: 96 MG/DL (ref 70–110)
RBC # BLD AUTO: 4.36 M/UL (ref 4–5.4)
WBC # BLD AUTO: 5.44 K/UL (ref 3.9–12.7)

## 2019-12-10 PROCEDURE — 36415 COLL VENOUS BLD VENIPUNCTURE: CPT

## 2019-12-10 PROCEDURE — 58571 PR LAPAROSCOPY W TOT HYSTERECTUTERUS <=250 GRAM  W TUBE/OVARY: ICD-10-PCS | Mod: ,,, | Performed by: OBSTETRICS & GYNECOLOGY

## 2019-12-10 PROCEDURE — 82962 GLUCOSE BLOOD TEST: CPT | Performed by: OBSTETRICS & GYNECOLOGY

## 2019-12-10 PROCEDURE — 71000015 HC POSTOP RECOV 1ST HR: Performed by: OBSTETRICS & GYNECOLOGY

## 2019-12-10 PROCEDURE — 85025 COMPLETE CBC W/AUTO DIFF WBC: CPT

## 2019-12-10 PROCEDURE — 63600175 PHARM REV CODE 636 W HCPCS: Performed by: NURSE ANESTHETIST, CERTIFIED REGISTERED

## 2019-12-10 PROCEDURE — 88307 TISSUE EXAM BY PATHOLOGIST: CPT | Performed by: PATHOLOGY

## 2019-12-10 PROCEDURE — 63600175 PHARM REV CODE 636 W HCPCS: Performed by: ANESTHESIOLOGY

## 2019-12-10 PROCEDURE — 88342 IMHCHEM/IMCYTCHM 1ST ANTB: CPT | Performed by: PATHOLOGY

## 2019-12-10 PROCEDURE — 36000711: Performed by: OBSTETRICS & GYNECOLOGY

## 2019-12-10 PROCEDURE — 27201423 OPTIME MED/SURG SUP & DEVICES STERILE SUPPLY: Performed by: OBSTETRICS & GYNECOLOGY

## 2019-12-10 PROCEDURE — 71000016 HC POSTOP RECOV ADDL HR: Performed by: OBSTETRICS & GYNECOLOGY

## 2019-12-10 PROCEDURE — 58571 PR LAPAROSCOPY W TOT HYSTERECTUTERUS <=250 GRAM  W TUBE/OVARY: ICD-10-PCS | Mod: 80,,, | Performed by: OBSTETRICS & GYNECOLOGY

## 2019-12-10 PROCEDURE — 25000003 PHARM REV CODE 250: Performed by: ANESTHESIOLOGY

## 2019-12-10 PROCEDURE — 00840 ANES IPER PX LOWER ABD NOS: CPT | Performed by: OBSTETRICS & GYNECOLOGY

## 2019-12-10 PROCEDURE — 63600175 PHARM REV CODE 636 W HCPCS: Performed by: OBSTETRICS & GYNECOLOGY

## 2019-12-10 PROCEDURE — 58571 TLH W/T/O 250 G OR LESS: CPT | Mod: ,,, | Performed by: OBSTETRICS & GYNECOLOGY

## 2019-12-10 PROCEDURE — 37000009 HC ANESTHESIA EA ADD 15 MINS: Performed by: OBSTETRICS & GYNECOLOGY

## 2019-12-10 PROCEDURE — 88307 TISSUE EXAM BY PATHOLOGIST: CPT | Mod: 26,,, | Performed by: PATHOLOGY

## 2019-12-10 PROCEDURE — 25000003 PHARM REV CODE 250: Performed by: NURSE ANESTHETIST, CERTIFIED REGISTERED

## 2019-12-10 PROCEDURE — 81025 URINE PREGNANCY TEST: CPT | Performed by: ANESTHESIOLOGY

## 2019-12-10 PROCEDURE — 37000008 HC ANESTHESIA 1ST 15 MINUTES: Performed by: OBSTETRICS & GYNECOLOGY

## 2019-12-10 PROCEDURE — 36000710: Performed by: OBSTETRICS & GYNECOLOGY

## 2019-12-10 PROCEDURE — 88342 CHG IMMUNOCYTOCHEMISTRY: ICD-10-PCS | Mod: 26,,, | Performed by: PATHOLOGY

## 2019-12-10 PROCEDURE — 71000033 HC RECOVERY, INTIAL HOUR: Performed by: OBSTETRICS & GYNECOLOGY

## 2019-12-10 PROCEDURE — 86850 RBC ANTIBODY SCREEN: CPT

## 2019-12-10 PROCEDURE — 88342 IMHCHEM/IMCYTCHM 1ST ANTB: CPT | Mod: 26,,, | Performed by: PATHOLOGY

## 2019-12-10 PROCEDURE — 58571 TLH W/T/O 250 G OR LESS: CPT | Mod: 80,,, | Performed by: OBSTETRICS & GYNECOLOGY

## 2019-12-10 PROCEDURE — 88307 PR  SURG PATH,LEVEL V: ICD-10-PCS | Mod: 26,,, | Performed by: PATHOLOGY

## 2019-12-10 RX ORDER — OXYCODONE HYDROCHLORIDE 5 MG/1
5 TABLET ORAL
Status: DISCONTINUED | OUTPATIENT
Start: 2019-12-10 | End: 2019-12-10 | Stop reason: HOSPADM

## 2019-12-10 RX ORDER — LIDOCAINE HYDROCHLORIDE 10 MG/ML
0.5 INJECTION, SOLUTION EPIDURAL; INFILTRATION; INTRACAUDAL; PERINEURAL ONCE
Status: DISCONTINUED | OUTPATIENT
Start: 2019-12-10 | End: 2019-12-10 | Stop reason: HOSPADM

## 2019-12-10 RX ORDER — CEFAZOLIN SODIUM 2 G/50ML
2 SOLUTION INTRAVENOUS
Status: DISCONTINUED | OUTPATIENT
Start: 2019-12-10 | End: 2019-12-10

## 2019-12-10 RX ORDER — SODIUM CHLORIDE 0.9 % (FLUSH) 0.9 %
3 SYRINGE (ML) INJECTION
Status: DISCONTINUED | OUTPATIENT
Start: 2019-12-10 | End: 2019-12-10 | Stop reason: HOSPADM

## 2019-12-10 RX ORDER — ONDANSETRON 2 MG/ML
4 INJECTION INTRAMUSCULAR; INTRAVENOUS DAILY PRN
Status: DISCONTINUED | OUTPATIENT
Start: 2019-12-10 | End: 2019-12-10 | Stop reason: HOSPADM

## 2019-12-10 RX ORDER — MIDAZOLAM HYDROCHLORIDE 1 MG/ML
INJECTION INTRAMUSCULAR; INTRAVENOUS
Status: DISCONTINUED | OUTPATIENT
Start: 2019-12-10 | End: 2019-12-10

## 2019-12-10 RX ORDER — NEOSTIGMINE METHYLSULFATE 1 MG/ML
INJECTION, SOLUTION INTRAVENOUS
Status: DISCONTINUED | OUTPATIENT
Start: 2019-12-10 | End: 2019-12-10

## 2019-12-10 RX ORDER — ACETAMINOPHEN 500 MG
1000 TABLET ORAL
Status: COMPLETED | OUTPATIENT
Start: 2019-12-10 | End: 2019-12-10

## 2019-12-10 RX ORDER — KETOROLAC TROMETHAMINE 30 MG/ML
INJECTION, SOLUTION INTRAMUSCULAR; INTRAVENOUS
Status: DISCONTINUED | OUTPATIENT
Start: 2019-12-10 | End: 2019-12-10

## 2019-12-10 RX ORDER — ROCURONIUM BROMIDE 10 MG/ML
INJECTION, SOLUTION INTRAVENOUS
Status: DISCONTINUED | OUTPATIENT
Start: 2019-12-10 | End: 2019-12-10

## 2019-12-10 RX ORDER — IBUPROFEN 600 MG/1
600 TABLET ORAL EVERY 6 HOURS PRN
Status: CANCELLED | OUTPATIENT
Start: 2019-12-10

## 2019-12-10 RX ORDER — HYDROMORPHONE HYDROCHLORIDE 2 MG/ML
0.4 INJECTION, SOLUTION INTRAMUSCULAR; INTRAVENOUS; SUBCUTANEOUS EVERY 5 MIN PRN
Status: DISCONTINUED | OUTPATIENT
Start: 2019-12-10 | End: 2019-12-10 | Stop reason: HOSPADM

## 2019-12-10 RX ORDER — HYDROCODONE BITARTRATE AND ACETAMINOPHEN 5; 325 MG/1; MG/1
1 TABLET ORAL EVERY 4 HOURS PRN
Status: CANCELLED | OUTPATIENT
Start: 2019-12-10

## 2019-12-10 RX ORDER — ONDANSETRON 2 MG/ML
INJECTION INTRAMUSCULAR; INTRAVENOUS
Status: DISCONTINUED | OUTPATIENT
Start: 2019-12-10 | End: 2019-12-10

## 2019-12-10 RX ORDER — FENTANYL CITRATE 50 UG/ML
INJECTION, SOLUTION INTRAMUSCULAR; INTRAVENOUS
Status: DISCONTINUED | OUTPATIENT
Start: 2019-12-10 | End: 2019-12-10

## 2019-12-10 RX ORDER — PROPOFOL 10 MG/ML
VIAL (ML) INTRAVENOUS
Status: DISCONTINUED | OUTPATIENT
Start: 2019-12-10 | End: 2019-12-10

## 2019-12-10 RX ORDER — DEXAMETHASONE SODIUM PHOSPHATE 4 MG/ML
INJECTION, SOLUTION INTRA-ARTICULAR; INTRALESIONAL; INTRAMUSCULAR; INTRAVENOUS; SOFT TISSUE
Status: DISCONTINUED | OUTPATIENT
Start: 2019-12-10 | End: 2019-12-10

## 2019-12-10 RX ORDER — METOPROLOL TARTRATE 100 MG/1
25 TABLET ORAL 2 TIMES DAILY
COMMUNITY
End: 2020-01-28 | Stop reason: DRUGHIGH

## 2019-12-10 RX ORDER — SODIUM CHLORIDE 9 MG/ML
INJECTION, SOLUTION INTRAVENOUS CONTINUOUS
Status: DISCONTINUED | OUTPATIENT
Start: 2019-12-10 | End: 2019-12-10 | Stop reason: HOSPADM

## 2019-12-10 RX ORDER — CEFAZOLIN SODIUM 1 G/3ML
2 INJECTION, POWDER, FOR SOLUTION INTRAMUSCULAR; INTRAVENOUS
Status: COMPLETED | OUTPATIENT
Start: 2019-12-10 | End: 2019-12-10

## 2019-12-10 RX ORDER — GLYCOPYRROLATE 0.2 MG/ML
INJECTION INTRAMUSCULAR; INTRAVENOUS
Status: DISCONTINUED | OUTPATIENT
Start: 2019-12-10 | End: 2019-12-10

## 2019-12-10 RX ORDER — MEPERIDINE HYDROCHLORIDE 25 MG/ML
12.5 INJECTION INTRAMUSCULAR; INTRAVENOUS; SUBCUTANEOUS ONCE AS NEEDED
Status: DISCONTINUED | OUTPATIENT
Start: 2019-12-10 | End: 2019-12-10 | Stop reason: HOSPADM

## 2019-12-10 RX ORDER — HYDROCODONE BITARTRATE AND ACETAMINOPHEN 5; 325 MG/1; MG/1
1 TABLET ORAL EVERY 4 HOURS PRN
Qty: 21 TABLET | Refills: 0 | Status: SHIPPED | OUTPATIENT
Start: 2019-12-10 | End: 2020-01-28

## 2019-12-10 RX ORDER — SODIUM CHLORIDE, SODIUM LACTATE, POTASSIUM CHLORIDE, CALCIUM CHLORIDE 600; 310; 30; 20 MG/100ML; MG/100ML; MG/100ML; MG/100ML
INJECTION, SOLUTION INTRAVENOUS CONTINUOUS
Status: DISCONTINUED | OUTPATIENT
Start: 2019-12-10 | End: 2019-12-10 | Stop reason: HOSPADM

## 2019-12-10 RX ORDER — LIDOCAINE HCL/PF 100 MG/5ML
SYRINGE (ML) INTRAVENOUS
Status: DISCONTINUED | OUTPATIENT
Start: 2019-12-10 | End: 2019-12-10

## 2019-12-10 RX ORDER — HYDROCODONE BITARTRATE AND ACETAMINOPHEN 10; 325 MG/1; MG/1
1 TABLET ORAL EVERY 4 HOURS PRN
Status: CANCELLED | OUTPATIENT
Start: 2019-12-10

## 2019-12-10 RX ORDER — SODIUM CHLORIDE, SODIUM LACTATE, POTASSIUM CHLORIDE, CALCIUM CHLORIDE 600; 310; 30; 20 MG/100ML; MG/100ML; MG/100ML; MG/100ML
INJECTION, SOLUTION INTRAVENOUS CONTINUOUS
Status: CANCELLED | OUTPATIENT
Start: 2019-12-10

## 2019-12-10 RX ADMIN — DEXAMETHASONE SODIUM PHOSPHATE 8 MG: 4 INJECTION, SOLUTION INTRAMUSCULAR; INTRAVENOUS at 08:12

## 2019-12-10 RX ADMIN — ROCURONIUM BROMIDE 50 MG: 10 INJECTION, SOLUTION INTRAVENOUS at 08:12

## 2019-12-10 RX ADMIN — FENTANYL CITRATE 50 MCG: 50 INJECTION, SOLUTION INTRAMUSCULAR; INTRAVENOUS at 08:12

## 2019-12-10 RX ADMIN — PROPOFOL 250 MG: 10 INJECTION, EMULSION INTRAVENOUS at 08:12

## 2019-12-10 RX ADMIN — LIDOCAINE HYDROCHLORIDE 100 MG: 20 INJECTION, SOLUTION INTRAVENOUS at 08:12

## 2019-12-10 RX ADMIN — MIDAZOLAM HYDROCHLORIDE 2 MG: 1 INJECTION, SOLUTION INTRAMUSCULAR; INTRAVENOUS at 08:12

## 2019-12-10 RX ADMIN — SODIUM CHLORIDE, SODIUM LACTATE, POTASSIUM CHLORIDE, AND CALCIUM CHLORIDE: 600; 310; 30; 20 INJECTION, SOLUTION INTRAVENOUS at 09:12

## 2019-12-10 RX ADMIN — HYDROMORPHONE HYDROCHLORIDE 0.4 MG: 2 INJECTION, SOLUTION INTRAMUSCULAR; INTRAVENOUS; SUBCUTANEOUS at 10:12

## 2019-12-10 RX ADMIN — KETOROLAC TROMETHAMINE 30 MG: 30 INJECTION, SOLUTION INTRAMUSCULAR; INTRAVENOUS at 10:12

## 2019-12-10 RX ADMIN — GLYCOPYRROLATE 0.7 MG: 0.2 INJECTION, SOLUTION INTRAMUSCULAR; INTRAVENOUS at 10:12

## 2019-12-10 RX ADMIN — FENTANYL CITRATE 50 MCG: 50 INJECTION, SOLUTION INTRAMUSCULAR; INTRAVENOUS at 10:12

## 2019-12-10 RX ADMIN — CEFAZOLIN 2 G: 330 INJECTION, POWDER, FOR SOLUTION INTRAMUSCULAR; INTRAVENOUS at 08:12

## 2019-12-10 RX ADMIN — FENTANYL CITRATE 100 MCG: 50 INJECTION, SOLUTION INTRAMUSCULAR; INTRAVENOUS at 08:12

## 2019-12-10 RX ADMIN — ONDANSETRON 4 MG: 2 INJECTION INTRAMUSCULAR; INTRAVENOUS at 09:12

## 2019-12-10 RX ADMIN — CARBOXYMETHYLCELLULOSE SODIUM 3 DROP: 2.5 SOLUTION/ DROPS OPHTHALMIC at 08:12

## 2019-12-10 RX ADMIN — SODIUM CHLORIDE, SODIUM LACTATE, POTASSIUM CHLORIDE, AND CALCIUM CHLORIDE: 600; 310; 30; 20 INJECTION, SOLUTION INTRAVENOUS at 08:12

## 2019-12-10 RX ADMIN — FENTANYL CITRATE 50 MCG: 50 INJECTION, SOLUTION INTRAMUSCULAR; INTRAVENOUS at 09:12

## 2019-12-10 RX ADMIN — ROCURONIUM BROMIDE 10 MG: 10 INJECTION, SOLUTION INTRAVENOUS at 09:12

## 2019-12-10 RX ADMIN — NEOSTIGMINE METHYLSULFATE 5 MG: 1 INJECTION INTRAVENOUS at 10:12

## 2019-12-10 RX ADMIN — ACETAMINOPHEN 1000 MG: 500 TABLET, FILM COATED ORAL at 06:12

## 2019-12-10 NOTE — PLAN OF CARE
Elizabeth Piña has met all discharge criteria from Phase II. Vital Signs are stable, ambulating  without difficulty. Discharge instructions given, patient verbalized understanding. Discharged from facility via wheelchair in stable condition.

## 2019-12-10 NOTE — TRANSFER OF CARE
"Anesthesia Transfer of Care Note    Patient: Elizabeth Piña    Procedure(s) Performed: Procedure(s) (LRB):  HYSTERECTOMY, TOTAL, LAPAROSCOPIC (N/A)  SALPINGECTOMY, LAPAROSCOPIC (Bilateral)  CYSTOSCOPY (N/A)    Patient location: PACU    Anesthesia Type: general    Transport from OR: Transported from OR on 2-3 L/min O2 by NC with adequate spontaneous ventilation    Post pain: adequate analgesia    Post assessment: no apparent anesthetic complications and tolerated procedure well    Post vital signs: stable    Level of consciousness: awake, alert and oriented    Nausea/Vomiting: no nausea/vomiting    Complications: none    Transfer of care protocol was followed      Last vitals:   Visit Vitals  /85 (BP Location: Right arm, Patient Position: Sitting)   Pulse 69   Temp 36.8 °C (98.2 °F) (Oral)   Resp 18   Ht 5' 7" (1.702 m)   Wt 91.2 kg (201 lb 1 oz)   SpO2 100%   Breastfeeding? No   BMI 31.49 kg/m²     "

## 2019-12-10 NOTE — BRIEF OP NOTE
[facility], operated by [facility]  Brief Operative Note     SUMMARY     Surgery Date: 12/10/2019     Surgeon(s) and Role:     * Julia Fagan MD - Primary     * Christopher Brooks Jr., MD - Assisting        Pre-op Diagnosis:  Endometriosis determined by laparoscopy [N80.9]    Post-op Diagnosis:  Post-Op Diagnosis Codes:     * Endometriosis determined by laparoscopy [N80.9]    Procedure(s) (LRB):  HYSTERECTOMY, TOTAL, LAPAROSCOPIC (N/A)  SALPINGECTOMY, LAPAROSCOPIC (Bilateral)  CYSTOSCOPY (N/A)    Anesthesia: General    Description of the findings of the procedure: Normal appearing uterus, tubes and ovaries.        Estimated Blood Loss: 100 mL         Specimens:   Specimen (12h ago, onward)    None          Discharge Note    SUMMARY     Admit Date: 12/10/2019    Discharge Date and Time:  12/10/2019 10:23 AM    Hospital Course (synopsis of major diagnoses, care, treatment, and services provided during the course of the hospital stay): Patient was admitted for the above mentioned procedure.  Procedure was performed without difficulty.  Please see operative report for further details.  She was transferred to recovery in stable condition and discharged home the same day.        Final Diagnosis: Post-Op Diagnosis Codes:     * Endometriosis determined by laparoscopy [N80.9]    Disposition: Home or Self Care    Follow Up/Patient Instructions:     Medications:  Reconciled Home Medications:      Medication List      START taking these medications    HYDROcodone-acetaminophen 5-325 mg per tablet  Commonly known as:  NORCO  Take 1 tablet by mouth every 4 (four) hours as needed for Pain.        CONTINUE taking these medications    metoprolol tartrate 100 MG tablet  Commonly known as:  LOPRESSOR  Take 25 mg by mouth 2 (two) times daily.     rizatriptan 5 MG tablet  Commonly known as:  MAXALT  TAKE ONE DAILY AS NEEDED FOR HEADACHE. MAY REPEAT DOSE ONCE AFTER 2 HOURS IF NEEDED.        STOP taking these medications    etonogestrel  68 mg Impl subdermal device  Commonly known as:  Nexplanon     Orilissa 150 mg Tab  Generic drug:  elagolix          Discharge Procedure Orders   Diet general     Call MD for:  temperature >100.4     Call MD for:  persistent nausea and vomiting     Call MD for:  severe uncontrolled pain     Call MD for:  difficulty breathing, headache or visual disturbances     Call MD for:  redness, tenderness, or signs of infection (pain, swelling, redness, odor or green/yellow discharge around incision site)     Call MD for:  hives     Call MD for:  persistent dizziness or light-headedness     Call MD for:  extreme fatigue     Remove dressing in 72 hours     Follow-up Information     Julia Fagan MD In 6 weeks.    Specialty:  Obstetrics and Gynecology  Why:  Post op  Contact information:  [address] [facility] [id number] [phone]

## 2019-12-10 NOTE — ANESTHESIA PROCEDURE NOTES
Intubation  Performed by: Wolfgang Roque CRNA  Authorized by: Chad Wolff MD     Intubation:     Induction:  Intravenous    Intubated:  Postinduction    Mask Ventilation:  Easy mask    Attempts:  1    Attempted By:  CRNA    Method of Intubation:  Video laryngoscopy    Blade:  Wan 3    Laryngeal View Grade: Grade I - full view of chords      Difficult Airway Encountered?: No      Complications:  None    Airway Device:  Oral endotracheal tube    Airway Device Size:  7.5    Style/Cuff Inflation:  Cuffed (inflated to minimal occlusive pressure)    Inflation Amount (mL):  4    Tube secured:  21    Secured at:  The lips    Placement Verified By:  Capnometry    Complicating Factors:  None    Findings Post-Intubation:  BS equal bilateral and atraumatic/condition of teeth unchanged

## 2019-12-10 NOTE — ANESTHESIA POSTPROCEDURE EVALUATION
Anesthesia Post Evaluation    Patient: Elizabeth Piña    Procedure(s) Performed: Procedure(s) (LRB):  HYSTERECTOMY, TOTAL, LAPAROSCOPIC (N/A)  SALPINGECTOMY, LAPAROSCOPIC (Bilateral)  CYSTOSCOPY (N/A)    Final Anesthesia Type: general    Patient location during evaluation: PACU  Patient participation: Yes- Able to Participate  Level of consciousness: oriented and awake  Post-procedure vital signs: reviewed and stable  Pain management: adequate  Airway patency: patent    PONV status at discharge: No PONV  Anesthetic complications: no      Cardiovascular status: hemodynamically stable  Respiratory status: unassisted, spontaneous ventilation and room air  Hydration status: euvolemic  Follow-up not needed.          Vitals Value Taken Time   /55 12/10/2019 11:05 AM   Temp 36.8 °C (98.2 °F) 12/10/2019 11:05 AM   Pulse 75 12/10/2019 11:05 AM   Resp 16 12/10/2019 11:05 AM   SpO2 100 % 12/10/2019 11:05 AM         Event Time     Out of Recovery 11:00:44          Pain/Anibal Score: Pain Rating Prior to Med Admin: 5 (12/10/2019 10:47 AM)  Pain Rating Post Med Admin: 3 (states tolerable) (12/10/2019 10:52 AM)  Anibal Score: 10 (12/10/2019 11:05 AM)

## 2019-12-10 NOTE — H&P
Gynecology    SUBJECTIVE:     Chief Complaint: No chief complaint on file.       History of Present Illness:  No changes verified with the patient prior to surgery.  Risks and benefits of surgery reviewed with patient.     24 year old with known endometriosis.  She was started on orlissa 6 weeks ago.  Reports that is helping with her pain.  However, she initially started on the orlissa because she didn't have time for the hysterectomy, and now she's no longer working and would like to pursue this.  See previous note regarding pain.    Previous surgeries:   Laparoscopic appendectomy (not ruptured prior to surgery)  Diagnostic laparoscopy for pelvic pain and BTL  Umbilical hernia repair 8/2019, no mesh used    Review of Systems:  Review of Systems   Constitutional: Negative for appetite change, fever and unexpected weight change.   Respiratory: Negative for shortness of breath.    Cardiovascular: Negative for chest pain.   Gastrointestinal: Negative for nausea and vomiting.   Genitourinary: Positive for dysmenorrhea and pelvic pain. Negative for menorrhagia, menstrual problem, vaginal bleeding, vaginal discharge and vaginal pain.        OBJECTIVE:     Physical Exam:  Physical Exam   Constitutional: She is oriented to person, place, and time. She appears well-developed and well-nourished.   Pulmonary/Chest: Effort normal.   Neurological: She is oriented to person, place, and time.   Skin: No pallor.   Psychiatric: She has a normal mood and affect. Her behavior is normal. Judgment and thought content normal.   Nursing note and vitals reviewed.      ASSESSMENT:       ICD-10-CM ICD-9-CM    1. Endometriosis N80.9 617.9 Case Request Operating Room: HYSTERECTOMY, TOTAL, LAPAROSCOPIC      Type & Screen      CBC auto differential      Ambulatory consult to Anesthesiology   2. Dysmenorrhea N94.6 625.3 Case Request Operating Room: HYSTERECTOMY, TOTAL, LAPAROSCOPIC      Type & Screen      CBC auto differential      Ambulatory  consult to Anesthesiology          Plan:      Elizabeth was seen today for consult.    Diagnoses and all orders for this visit:    Endometriosis  -     Case Request Operating Room: HYSTERECTOMY, TOTAL, LAPAROSCOPIC  -     Type & Screen; Future  -     CBC auto differential; Future  -     Ambulatory consult to Anesthesiology    Dysmenorrhea  -     Case Request Operating Room: HYSTERECTOMY, TOTAL, LAPAROSCOPIC  -     Type & Screen; Future  -     CBC auto differential; Future  -     Ambulatory consult to Anesthesiology    - proceed to surgery, plans for procedure and risks of surgery reviewed with patient.      Orders Placed This Encounter   Procedures    CBC auto differential    Pregnancy, urine rapid    Diet NPO Except for: Medication    Vital signs    Use 1% lidocaine at IV site    Notify Physician - Potential Need of Opioid Reversal    Chlorohexidine Gluconate Bath    Notify physician    Notify Physician - Potential Need of Opioid Reversal    Notify Physician/Vital Signs Parameters Urine output less than 0.5mL/kg/hr (with indwelling catheter) or 30 mL/hr (without indwelling catheter) or blood glucose greater than 200 mg/dL    Place sequential compression device    Notify Physician - Potential Need of Opioid Reversal    maintenance fluids per service    Oxygen Continuous    Pulse Oximetry Q4H    POCT urine pregnancy    Type & Screen    Place in Outpatient       No follow-ups on file.    Julia Fagan

## 2019-12-10 NOTE — DISCHARGE INSTRUCTIONS
Discharge Instructions for Laparoscopic Hysterectomy  You had a procedure called laparoscopic hysterectomy. A surgeon removed your uterus using instruments inserted through small incisions in your abdomen. These incisions may be tender or sore. You may also have pain in your upper back or shoulders. This is from the gas used to enlarge your abdomen to allow your doctor to see inside your pelvis and perform the procedure. This pain usually goes away in a day or two. It usually takes from 1-4 weeks to recover from laparoscopic hysterectomy. Remember, though, that recovery time varies from woman to woman. Here's what you can do to speed your recovery following surgery.    Home Care   · Continue the coughing and deep breathing exercises that you learned in the hospital.  · Take your medications exactly as directed by your doctor.  · Avoid constipation.  ¨ Eat fruits, vegetables, and whole grains.  ¨ Drink 6-8 glasses of water a day, unless told to do otherwise.  ¨ Use a laxative or a mild stool softener if your doctor says it's okay.  · Shower as usual in 24 hours. Wash your incisions with mild soap and water. Pat dry.  Avoid baths, swimming pools and hot tubs until  seen by your physician for a post-op follow up.  · Don't use oils, powders, or lotions on your incisions.  · You should not have any sexual activity for six weeks.  This can cause severe bleeding. You should not insert anything into the vagina for six weeks, no tampons or douching.  · If you had both ovaries removed, report hot flashes, mood swings, and irritability to your doctor. There may be medications that can help you.    Activity  · Ask your doctor when you can start driving again. It's usually okay to drive as soon as you are free of pain and able to move comfortably from side to side. Don't drive while you are still taking narcotic pain medications.  · Ask others to help with chores and errands while you recover.  · Dont lift anything  heavier than 10 pounds for 4 weeks.  · Dont vacuum or do other strenuous activities until the doctor says it's okay.  · Walk as often as you feel able.  · Climb stairs slowly and pause after every few steps.    Follow-Up  Make a follow-up appointment as directed by our staff.     When to Call Your Doctor  Call your doctor right away if you have any of the following:  · Fever above 100.4°F (38°C) or chills  · Bright red vaginal bleeding or vaginal bleeding that soaks more than one sanitary pad per hour  · A foul smelling discharge from the vagina  · Trouble urinating or burning when you urinate  · Severe pain or bloating in your abdomen  · Redness, swelling, or drainage at your incision sites  · Shortness of breath or chest pain           Anesthesia: After Your Surgery  Youve just had surgery. During surgery, you received medication called anesthesia to keep you comfortable and pain-free. After surgery, you may experience some pain or nausea. This is common. Here are some tips for feeling better and recovering after surgery.    Going home  Your doctor or nurse will show you how to take care of yourself when you go home. He or she will also answer your questions. Have an adult family member or friend drive you home. For the first 24 hours after your surgery:    · Do not drive or use heavy equipment.  · Do not make important decisions or sign legal documents.  · Avoid alcohol.  · Have someone stay with you, if needed. He or she can watch for problems and help keep you safe.  · Take your time getting up from a seated or lying position. You may experience dizziness for 24 hours.    Be sure to keep all follow-up appointments with your doctor. And rest after your procedure for as long as your doctor tells you to.    Coping with pain  If you have pain after surgery, pain medication will help you feel better. Take it as directed, before pain becomes severe. Also, ask your doctor or pharmacist about other ways to control  pain, such as with heat, ice, and relaxation. And follow any other instructions your surgeon or nurse gives you.    URINARY RETENTION  Should you experience a decrease in your urine output or are unable to urinate following surgery, this can be due to the medications given during surgery.  We recommend you going to the nearest Emergency Department.    Tips for taking pain medication  To get the best relief possible, remember these points:    · Pain medications can upset your stomach. Taking them with a little food may help.  · Most pain relievers taken by mouth need at least 20 to 30 minutes to take effect.  · Taking medication on a schedule can help you remember to take it. Try to time your medication so that you can take it before beginning an activity, such as dressing, walking, or sitting down for dinner.  · Constipation is a common side effect of pain medications. Contact your doctor before taking any medications like laxatives or stool softeners to help relieve constipation. Also ask about any dietary restrictions, because drinking lots of fluids and eating foods like fruits and vegetables that are high in fiber can also help. Remember, dont take laxatives unless your surgeon has prescribed them.  · Mixing alcohol and pain medication can cause dizziness and slow your breathing. It can even be fatal. Dont drink alcohol while taking pain medication.  · Pain medication can slow your reflexes. Dont drive or operate machinery while taking pain medication.    If your health care provider tells you to take acetaminophen to help relieve your pain, ask him or her how much you are supposed to take each day. (Acetaminophen is the generic name for Tylenol and other brand-name pain relievers.) Acetaminophen or other pain relievers may interact with your prescription medicines or other over-the-counter (OTC) drugs. Some prescription medications contain acetaminophen along with other active ingredients. Using both  prescription and OTC acetaminophen for pain can cause you to overdose. The FDA recommends that you read the labels on your OTC medications carefully. This will help you to clearly understand the list of active ingredients, dosing instructions, and any warnings. It may also help you avoid taking too much acetaminophen. If you have questions or don't understand the information, ask your pharmacist or health care provider to explain it to you before you take the OTC medication.    Managing nausea  Some people have an upset stomach after surgery. This is often due to anesthesia, pain, pain medications, or the stress of surgery. The following tips will help you manage nausea and get good nutrition as you recover. If you were on a special diet before surgery, ask your doctor if you should follow it during recovery. These tips may help:    · Dont push yourself to eat. Your body will tell you when to eat and how much.  · Start off with clear liquids and soup. They are easier to digest.  · Progress to semi-solid foods (mashed potatoes, applesauce, and gelatin) as you feel ready.  · Slowly move to solid foods. Dont eat fatty, rich, or spicy foods at first.  · Dont force yourself to have three large meals a day. Instead, eat smaller amounts more often.  · Take pain medications with a small amount of solid food, such as crackers or toast to avoid nausea.      Call your surgeon if    · You feel too sleepy, dizzy, or groggy (medication may be too strong).  · You have side effects like nausea, vomiting, or skin changes (rash, itching, or hives).     © 3347-9437 [employer]. [address] [place] [address], [place] [address]. All rights reserved. This information is not intended as a substitute for professional medical care. Always follow your healthcare professional's instructions.    PLEASE FOLLOW ANY OTHER INSTRUCTIONS PROVIDED TO YOU BY DR. CACERES!

## 2019-12-10 NOTE — OP NOTE
OPERATIVE REPORT    DATE: (date: 12/10/19)    PREOPERATIVE DIAGNOSIS  1.Endometriosis  2. Pelvic pain  3. Dysmenorrhea    POSTOPERATIVE DIAGNOSIS  1.s/p TLH    PROCEDURE:  1. Total Laparoscopic Hysterectomy  2. Bilateral salpingectomy  3. Cystoscopy    SURGEON: Dr. Julia Fagan    ASSISTANT: Christopher Brooks, because no qualified resident was available to assist with the case.    ANESTHESIA: General    COMPLICATIONS: None    EBL: 100 cc    IV FLUIDS: 1400 cc    URINE OUTPUT: 400 cc    FINDINGS: 8 week sized uterus, normal tubes and ovaries bilaterally    PROCEDURE: Patient was taking to the operating room where general anesthesia was administered and found to be adequate.  She was prepped and draped in the dorsal lithotomy position.  A weighted sterile speculum was placed in the vagina.  The anterior lip of the cervix was grasped with a single tooth tenaculum.  The uterus was sounded to approximately 6 cm.  A stay suture of 0-Vicryl was placed at 12 o'clock and 6 o'clock on the cervix.  A MEKA manipulator was placed inside the endometrial cavity.      Gloves were changed.  A Veress needle was inserted into the umbilicus under tenting of the anterior abdominal wall.  Placement into the peritoneal cavity was confirmed via saline drop test.  The abdomen was insufflated to 20mm Hg using Carbon dioxide.  A 10 mm supraumbilical skin incision was made with the scalpel.  A 10 mm Optiview trocar was advanced through this incision.  Excellent hemostasis was noted.  The Meka tip had been inserted through the fundus of the uterus.  The patient was placed in deep Trendelenburg.  An 8 mm left lateral skin incision was made with a scalpel and a 8 mm airseal trocar was advanced through this incision under visualization of the camera.  A 5 mm right lateral skin incision was made with the scalpel.  A 5 mm trocar was advanced through this incision under visualization of the camera.  Excellent hemostasis was noted.      Attention was  turned to the left round ligament.  This was cauterized and transected and continued anteriorly to create the bladder flap to the midline.  Attention was turned to the left fallopian tube which was transected over to the utero-ovarian ligament.  This was cauterized and transected and carried down to the level of the uterine vessels.  The vessels were cauterized but not transected.  Attention was turned to the right round ligament.  It was cauterized and transected and continued anteriorly to finish creating the bladder flap.  Attention was turned to the right fallopian tube which was dissected to the utero-ovarian ligament.  This was cauterized and transected and carried down to the level of the uterine vessels.  The vessels were cauterized and transected.  Attention was turned to the anterior portion of the cervix.  The bladder was dissected off the cervix.  The posterior colpotomy was created and carried anteriorly ensuring that the the bilateral uterine vessels were appropriately cauterized.       The uterus was removed vaginally.  A moist laparotomy sponge inside of glove was placed in the vagina to maintain intraperitoneal pressure.  The vaginal cuff was reapproximated in an interrupted fashion using 0-Vicryl suture using the Endostitch.  The pelvis was copiously irrigated and suctioned.  Excellent hemostasis was noted.      The mead catheter was removed.  A cystoscope was advanced through the urethra.  The bladder was inspected and found to be intact.  Both ureters were seen effluxing.  The cystoscope was removed, the mead was not replaced.    Attention was turned to the anterior abdominal wall. The abdomen was deflated and all trocars were removed.  The skin was closed with 4-0 Monocryl in a subcuticular fashion.  The sponge and glove were removed.  Sponge, lap, and needle counts were correct x 2.  The patient was taken to the recovery room in stable condition with the mead draining urine.      Julia  JOJO Fagan

## 2019-12-11 ENCOUNTER — PATIENT MESSAGE (OUTPATIENT)
Dept: OBSTETRICS AND GYNECOLOGY | Facility: CLINIC | Age: 24
End: 2019-12-11

## 2019-12-17 NOTE — OPERATIVE NOTE ADDENDUM
Certification of Assistant at Surgery       Surgery Date: 12/10/2019     Participating Surgeons:  Surgeon(s) and Role:     * Julia Fagan MD - Primary     * Christopher Brooks Jr., MD - Assisting    Procedures:  Procedure(s) (LRB):  HYSTERECTOMY, TOTAL, LAPAROSCOPIC (N/A)  SALPINGECTOMY, LAPAROSCOPIC (Bilateral)  CYSTOSCOPY (N/A)    Assistant Surgeon's Certification of Necessity:  I understand that section 1842 (b) (6) (d) of the Social Security Act generally prohibits Medicare Part B reasonable charge payment for the services of assistants at surgery in teaching hospitals when qualified residents are available to furnish such services. I certify that the services for which payment is claimed were medically necessary, and that no qualified resident was available to perform the services. I further understand that these services are subject to post-payment review by the Medicare carrier.      Christopher Brooks Jr, MD    12/17/2019  1:23 PM    Christopher Brooks MD

## 2019-12-24 LAB
COMMENT: NORMAL
FINAL PATHOLOGIC DIAGNOSIS: NORMAL
GROSS: NORMAL

## 2020-01-01 ENCOUNTER — PATIENT MESSAGE (OUTPATIENT)
Dept: OBSTETRICS AND GYNECOLOGY | Facility: CLINIC | Age: 25
End: 2020-01-01

## 2020-01-24 ENCOUNTER — OFFICE VISIT (OUTPATIENT)
Dept: OBSTETRICS AND GYNECOLOGY | Facility: CLINIC | Age: 25
End: 2020-01-24
Payer: MEDICAID

## 2020-01-24 VITALS
HEIGHT: 67 IN | WEIGHT: 206.13 LBS | DIASTOLIC BLOOD PRESSURE: 82 MMHG | BODY MASS INDEX: 32.35 KG/M2 | SYSTOLIC BLOOD PRESSURE: 118 MMHG

## 2020-01-24 DIAGNOSIS — Z98.890 POST-OPERATIVE STATE: Primary | ICD-10-CM

## 2020-01-24 PROCEDURE — 99999 PR PBB SHADOW E&M-EST. PATIENT-LVL III: ICD-10-PCS | Mod: PBBFAC,,, | Performed by: OBSTETRICS & GYNECOLOGY

## 2020-01-24 PROCEDURE — 99999 PR PBB SHADOW E&M-EST. PATIENT-LVL III: CPT | Mod: PBBFAC,,, | Performed by: OBSTETRICS & GYNECOLOGY

## 2020-01-24 PROCEDURE — 99024 PR POST-OP FOLLOW-UP VISIT: ICD-10-PCS | Mod: ,,, | Performed by: OBSTETRICS & GYNECOLOGY

## 2020-01-24 PROCEDURE — 99213 OFFICE O/P EST LOW 20 MIN: CPT | Mod: PBBFAC | Performed by: OBSTETRICS & GYNECOLOGY

## 2020-01-24 PROCEDURE — 99024 POSTOP FOLLOW-UP VISIT: CPT | Mod: ,,, | Performed by: OBSTETRICS & GYNECOLOGY

## 2020-01-24 NOTE — PROGRESS NOTES
Gynecology    SUBJECTIVE:     Chief Complaint: Post-op Evaluation       History of Present Illness:  Postoperative Follow-up  Patient presents to the clinic 6 weeks status post total laparoscopic hysterectomy for pelvic pain. Eating a regular diet without difficulty. Bowel movements are normal. The patient is not having any pain.  Pelvic pain is resolved.     Nexplanon is still in place.    Review of Systems:  Review of Systems   Constitutional: Negative for appetite change, fever and unexpected weight change.   Respiratory: Negative for shortness of breath.    Cardiovascular: Negative for chest pain.   Gastrointestinal: Negative for constipation, diarrhea, nausea and vomiting.   Genitourinary: Negative for menorrhagia, menstrual problem, pelvic pain, vaginal bleeding, vaginal discharge and vaginal pain.        OBJECTIVE:     Physical Exam:  Physical Exam   Constitutional: She is oriented to person, place, and time. She appears well-developed and well-nourished.   Pulmonary/Chest: Effort normal.   Abdominal: Soft.   Genitourinary: Vagina normal. No labial fusion. There is no rash, tenderness, lesion or injury on the right labia. There is no rash, tenderness, lesion or injury on the left labia. Right adnexum displays no mass, no tenderness and no fullness. Left adnexum displays no mass, no tenderness and no fullness. No erythema, tenderness or bleeding in the vagina. No foreign body in the vagina. No signs of injury around the vagina. No vaginal discharge found.   Genitourinary Comments: Urethra: normal appearing urethra with no masses, tenderness or lesions  Urethral meatus: normal size, anterior vaginal wall with no prolapse, no lesions  Well healed cuff  Abdominal incisions healed   Neurological: She is alert and oriented to person, place, and time.   Psychiatric: She has a normal mood and affect. Her behavior is normal. Judgment and thought content normal.   Nursing note and vitals reviewed.      ASSESSMENT:        ICD-10-CM ICD-9-CM    1. Post-operative state Z98.890 V45.89           Plan:      Elizabeth was seen today for post-op evaluation.    Diagnoses and all orders for this visit:    Post-operative state    - recovered from surgery  - nexplanon left in place as pain is controlled and this may be a contributing factor; was recently placed right before surgery;  Will remove in three years    No orders of the defined types were placed in this encounter.      Follow up for annual or prn.    Julia Fagan

## 2020-01-28 ENCOUNTER — OFFICE VISIT (OUTPATIENT)
Dept: NEUROLOGY | Facility: CLINIC | Age: 25
End: 2020-01-28
Payer: MEDICAID

## 2020-01-28 VITALS
BODY MASS INDEX: 32.98 KG/M2 | RESPIRATION RATE: 18 BRPM | SYSTOLIC BLOOD PRESSURE: 128 MMHG | HEART RATE: 84 BPM | DIASTOLIC BLOOD PRESSURE: 92 MMHG | WEIGHT: 210.13 LBS | HEIGHT: 67 IN

## 2020-01-28 DIAGNOSIS — G43.019 INTRACTABLE MIGRAINE WITHOUT AURA AND WITHOUT STATUS MIGRAINOSUS: Primary | ICD-10-CM

## 2020-01-28 PROCEDURE — 99214 PR OFFICE/OUTPT VISIT, EST, LEVL IV, 30-39 MIN: ICD-10-PCS | Mod: S$PBB,,, | Performed by: PSYCHIATRY & NEUROLOGY

## 2020-01-28 PROCEDURE — 99999 PR PBB SHADOW E&M-EST. PATIENT-LVL III: ICD-10-PCS | Mod: PBBFAC,,, | Performed by: PSYCHIATRY & NEUROLOGY

## 2020-01-28 PROCEDURE — 99999 PR PBB SHADOW E&M-EST. PATIENT-LVL III: CPT | Mod: PBBFAC,,, | Performed by: PSYCHIATRY & NEUROLOGY

## 2020-01-28 PROCEDURE — 99214 OFFICE O/P EST MOD 30 MIN: CPT | Mod: S$PBB,,, | Performed by: PSYCHIATRY & NEUROLOGY

## 2020-01-28 PROCEDURE — 99213 OFFICE O/P EST LOW 20 MIN: CPT | Mod: PBBFAC | Performed by: PSYCHIATRY & NEUROLOGY

## 2020-01-28 RX ORDER — RIZATRIPTAN BENZOATE 5 MG/1
TABLET ORAL
Qty: 9 TABLET | Refills: 11 | Status: SHIPPED | OUTPATIENT
Start: 2020-01-28 | End: 2021-10-11

## 2020-01-28 RX ORDER — METOPROLOL TARTRATE 25 MG/1
25 TABLET, FILM COATED ORAL 2 TIMES DAILY
COMMUNITY
End: 2021-10-11

## 2020-01-28 NOTE — PROGRESS NOTES
"HPI:  Elizabeth Piña is a 23 y.o. female with an episode in early 2012 consistent Vasovagal syncope. Near consulvive syncope at that time with "grunting and eyes rolling back" also noted, likely due to maintained upright position. Work up revealed only Small PFO noted on bubble study but cardiology agreed this is likely vasovagal in nature  No AED needed. History of migraine with aura.    The patient has not seen me in nearly a year.  Soon after, she requested switch to another agent from [facility] as she was worried about the effectiveness of her OCP  She had a hysterectomy last year    She states she weaned herself off of zongran as she is doing well.    She had headaches once per week usually relieved by Maxalt and not severe.     She wakes at 6 or 6:30am and tries to go to bed at 9pm or 10pm. No TV no lights and no electronics before bed.     Was also given -compazine at the last visit which caused drowiness.     Review of Systems   Constitutional: Negative for fever.   HENT: Negative for nosebleeds.    Eyes: Negative for double vision.   Respiratory: Negative for shortness of breath.    Cardiovascular: Negative for chest pain and leg swelling.   Gastrointestinal: Negative for blood in stool.   Genitourinary: Negative for hematuria.   Musculoskeletal: Negative for falls.   Skin: Negative for rash.   Neurological: Positive for headaches.   Psychiatric/Behavioral: The patient has insomnia.        Exam:   Gen Appearance, well developed/nourished in no apparent distress  CV: 2+ distal pulses with no edema or swelling  Neuro:  MS: Awake, alert, Sustains attention. Recent/remote memory intact, Language is full to spontaneous speech/comprehension. Fund of Knowledge is full  CN: Optic discs are flat with normal vasculature, PERRL, Extraoccular movements and visual fields are full. Normal facial sensation and strength, Tongue and Palate are midline and strong. Shoulder Shrug symmetric and strong.  Motor: Normal bulk, tone, no " "abnormal movements. No protonator drift and 5/5 bilateral UE/LE strength  Sensory: . Romberg negative and temp and vibration intact  Cerebellar: Finger-nose, Heal and shin intact  Gait: Normal stance, no ataxia      Imaging:3/28/12 MRI of the brain reports and images reviewed: NORMAL MRI OF THE BRAIN.  2/6/12 EEG report reviewed from [facility]: Normal    Assessment/Recommendation: Elizabeth Piña is a 24 y.o. female with an episode in early 2012 consistent Vasovagal syncope. Near consulvive syncope at that time with "grunting and eyes rolling back" also noted, likely due to maintained upright position. Work up revealed only Small PFO noted on bubble study but cardiology agreed this is likely vasovagal in nature  No AED needed. Also has chronic migraine with aura.  I recommend:     1. For migraine PRN:   Maxalt PRN-- helps most of the time. Imitrex not effective.   -compazine caused drowsiness  2. Propranolol  was being used due to palpitations per Cardiologist/ no syncope/ PFO likely incidentally but was not helpful for headaches  -now on Metoprolol  3.  Elavil caused too much sedation  4.  Topamax /Zonegran failed prior. Does not feel she needs a prevention medication at this time. Now S/p hysterectomy  - Botox or CGRP antagonist could be considered next if needed.   Sleep hygiene needed. Wake and go to bed at the same time every day with goal of only 7-8 hours to help insomnia. Encouraged regular exercise.   RTC 1 year          "

## 2020-10-05 ENCOUNTER — PATIENT MESSAGE (OUTPATIENT)
Dept: ADMINISTRATIVE | Facility: HOSPITAL | Age: 25
End: 2020-10-05

## 2021-01-04 ENCOUNTER — PATIENT MESSAGE (OUTPATIENT)
Dept: ADMINISTRATIVE | Facility: HOSPITAL | Age: 26
End: 2021-01-04

## 2021-02-22 ENCOUNTER — PATIENT MESSAGE (OUTPATIENT)
Dept: OBSTETRICS AND GYNECOLOGY | Facility: CLINIC | Age: 26
End: 2021-02-22

## 2021-04-05 ENCOUNTER — PATIENT MESSAGE (OUTPATIENT)
Dept: ADMINISTRATIVE | Facility: HOSPITAL | Age: 26
End: 2021-04-05

## 2021-05-10 ENCOUNTER — PATIENT MESSAGE (OUTPATIENT)
Dept: RESEARCH | Facility: HOSPITAL | Age: 26
End: 2021-05-10

## 2021-07-06 ENCOUNTER — PATIENT MESSAGE (OUTPATIENT)
Dept: ADMINISTRATIVE | Facility: HOSPITAL | Age: 26
End: 2021-07-06

## 2021-10-11 ENCOUNTER — OFFICE VISIT (OUTPATIENT)
Dept: FAMILY MEDICINE | Facility: CLINIC | Age: 26
End: 2021-10-11
Payer: COMMERCIAL

## 2021-10-11 ENCOUNTER — LAB VISIT (OUTPATIENT)
Dept: LAB | Facility: HOSPITAL | Age: 26
End: 2021-10-11
Attending: PHYSICIAN ASSISTANT
Payer: COMMERCIAL

## 2021-10-11 VITALS
SYSTOLIC BLOOD PRESSURE: 124 MMHG | HEART RATE: 75 BPM | TEMPERATURE: 99 F | BODY MASS INDEX: 37.92 KG/M2 | HEIGHT: 67 IN | OXYGEN SATURATION: 97 % | DIASTOLIC BLOOD PRESSURE: 84 MMHG | WEIGHT: 241.63 LBS

## 2021-10-11 DIAGNOSIS — Z00.00 ANNUAL PHYSICAL EXAM: ICD-10-CM

## 2021-10-11 DIAGNOSIS — R53.83 FATIGUE, UNSPECIFIED TYPE: ICD-10-CM

## 2021-10-11 DIAGNOSIS — G43.019 INTRACTABLE MIGRAINE WITHOUT AURA AND WITHOUT STATUS MIGRAINOSUS: ICD-10-CM

## 2021-10-11 DIAGNOSIS — Z00.00 ANNUAL PHYSICAL EXAM: Primary | ICD-10-CM

## 2021-10-11 LAB
ALBUMIN SERPL BCP-MCNC: 3.9 G/DL (ref 3.5–5.2)
ALP SERPL-CCNC: 50 U/L (ref 55–135)
ALT SERPL W/O P-5'-P-CCNC: 24 U/L (ref 10–44)
ANION GAP SERPL CALC-SCNC: 12 MMOL/L (ref 8–16)
AST SERPL-CCNC: 18 U/L (ref 10–40)
BASOPHILS # BLD AUTO: 0.02 K/UL (ref 0–0.2)
BASOPHILS NFR BLD: 0.3 % (ref 0–1.9)
BILIRUB SERPL-MCNC: 0.7 MG/DL (ref 0.1–1)
BUN SERPL-MCNC: 8 MG/DL (ref 6–20)
CALCIUM SERPL-MCNC: 9.7 MG/DL (ref 8.7–10.5)
CHLORIDE SERPL-SCNC: 105 MMOL/L (ref 95–110)
CHOLEST SERPL-MCNC: 231 MG/DL (ref 120–199)
CHOLEST/HDLC SERPL: 5 {RATIO} (ref 2–5)
CO2 SERPL-SCNC: 21 MMOL/L (ref 23–29)
CREAT SERPL-MCNC: 0.7 MG/DL (ref 0.5–1.4)
DIFFERENTIAL METHOD: NORMAL
EOSINOPHIL # BLD AUTO: 0.1 K/UL (ref 0–0.5)
EOSINOPHIL NFR BLD: 1.1 % (ref 0–8)
ERYTHROCYTE [DISTWIDTH] IN BLOOD BY AUTOMATED COUNT: 12.8 % (ref 11.5–14.5)
EST. GFR  (AFRICAN AMERICAN): >60 ML/MIN/1.73 M^2
EST. GFR  (NON AFRICAN AMERICAN): >60 ML/MIN/1.73 M^2
ESTIMATED AVG GLUCOSE: 103 MG/DL (ref 68–131)
FERRITIN SERPL-MCNC: 224 NG/ML (ref 20–300)
GLUCOSE SERPL-MCNC: 73 MG/DL (ref 70–110)
HBA1C MFR BLD: 5.2 % (ref 4–5.6)
HCT VFR BLD AUTO: 37.7 % (ref 37–48.5)
HDLC SERPL-MCNC: 46 MG/DL (ref 40–75)
HDLC SERPL: 19.9 % (ref 20–50)
HGB BLD-MCNC: 12.5 G/DL (ref 12–16)
IMM GRANULOCYTES # BLD AUTO: 0.01 K/UL (ref 0–0.04)
IMM GRANULOCYTES NFR BLD AUTO: 0.2 % (ref 0–0.5)
IRON SERPL-MCNC: 122 UG/DL (ref 30–160)
LDLC SERPL CALC-MCNC: 160.4 MG/DL (ref 63–159)
LYMPHOCYTES # BLD AUTO: 2.1 K/UL (ref 1–4.8)
LYMPHOCYTES NFR BLD: 33.9 % (ref 18–48)
MCH RBC QN AUTO: 28.9 PG (ref 27–31)
MCHC RBC AUTO-ENTMCNC: 33.2 G/DL (ref 32–36)
MCV RBC AUTO: 87 FL (ref 82–98)
MONOCYTES # BLD AUTO: 0.4 K/UL (ref 0.3–1)
MONOCYTES NFR BLD: 6.2 % (ref 4–15)
NEUTROPHILS # BLD AUTO: 3.6 K/UL (ref 1.8–7.7)
NEUTROPHILS NFR BLD: 58.3 % (ref 38–73)
NONHDLC SERPL-MCNC: 185 MG/DL
NRBC BLD-RTO: 0 /100 WBC
PLATELET # BLD AUTO: 247 K/UL (ref 150–450)
PMV BLD AUTO: 10.5 FL (ref 9.2–12.9)
POTASSIUM SERPL-SCNC: 4.1 MMOL/L (ref 3.5–5.1)
PROT SERPL-MCNC: 7.6 G/DL (ref 6–8.4)
RBC # BLD AUTO: 4.32 M/UL (ref 4–5.4)
SATURATED IRON: 35 % (ref 20–50)
SODIUM SERPL-SCNC: 138 MMOL/L (ref 136–145)
T3 SERPL-MCNC: 118 NG/DL (ref 60–180)
T4 FREE SERPL-MCNC: 0.74 NG/DL (ref 0.71–1.51)
TOTAL IRON BINDING CAPACITY: 345 UG/DL (ref 250–450)
TRANSFERRIN SERPL-MCNC: 233 MG/DL (ref 200–375)
TRIGL SERPL-MCNC: 123 MG/DL (ref 30–150)
TSH SERPL DL<=0.005 MIU/L-ACNC: 1.32 UIU/ML (ref 0.4–4)
WBC # BLD AUTO: 6.25 K/UL (ref 3.9–12.7)

## 2021-10-11 PROCEDURE — 99395 PR PREVENTIVE VISIT,EST,18-39: ICD-10-PCS | Mod: S$GLB,,, | Performed by: PHYSICIAN ASSISTANT

## 2021-10-11 PROCEDURE — 83036 HEMOGLOBIN GLYCOSYLATED A1C: CPT | Performed by: PHYSICIAN ASSISTANT

## 2021-10-11 PROCEDURE — 99395 PREV VISIT EST AGE 18-39: CPT | Mod: S$GLB,,, | Performed by: PHYSICIAN ASSISTANT

## 2021-10-11 PROCEDURE — 86376 MICROSOMAL ANTIBODY EACH: CPT | Performed by: PHYSICIAN ASSISTANT

## 2021-10-11 PROCEDURE — 80061 LIPID PANEL: CPT | Performed by: PHYSICIAN ASSISTANT

## 2021-10-11 PROCEDURE — 99999 PR PBB SHADOW E&M-EST. PATIENT-LVL III: ICD-10-PCS | Mod: PBBFAC,,, | Performed by: PHYSICIAN ASSISTANT

## 2021-10-11 PROCEDURE — 87389 HIV-1 AG W/HIV-1&-2 AB AG IA: CPT | Performed by: PHYSICIAN ASSISTANT

## 2021-10-11 PROCEDURE — 82728 ASSAY OF FERRITIN: CPT | Performed by: PHYSICIAN ASSISTANT

## 2021-10-11 PROCEDURE — 84466 ASSAY OF TRANSFERRIN: CPT | Performed by: PHYSICIAN ASSISTANT

## 2021-10-11 PROCEDURE — 84443 ASSAY THYROID STIM HORMONE: CPT | Performed by: PHYSICIAN ASSISTANT

## 2021-10-11 PROCEDURE — 36415 COLL VENOUS BLD VENIPUNCTURE: CPT | Mod: PO | Performed by: PHYSICIAN ASSISTANT

## 2021-10-11 PROCEDURE — 3074F PR MOST RECENT SYSTOLIC BLOOD PRESSURE < 130 MM HG: ICD-10-PCS | Mod: CPTII,S$GLB,, | Performed by: PHYSICIAN ASSISTANT

## 2021-10-11 PROCEDURE — 3008F PR BODY MASS INDEX (BMI) DOCUMENTED: ICD-10-PCS | Mod: CPTII,S$GLB,, | Performed by: PHYSICIAN ASSISTANT

## 2021-10-11 PROCEDURE — 1159F PR MEDICATION LIST DOCUMENTED IN MEDICAL RECORD: ICD-10-PCS | Mod: CPTII,S$GLB,, | Performed by: PHYSICIAN ASSISTANT

## 2021-10-11 PROCEDURE — 3079F DIAST BP 80-89 MM HG: CPT | Mod: CPTII,S$GLB,, | Performed by: PHYSICIAN ASSISTANT

## 2021-10-11 PROCEDURE — 3074F SYST BP LT 130 MM HG: CPT | Mod: CPTII,S$GLB,, | Performed by: PHYSICIAN ASSISTANT

## 2021-10-11 PROCEDURE — 84439 ASSAY OF FREE THYROXINE: CPT | Performed by: PHYSICIAN ASSISTANT

## 2021-10-11 PROCEDURE — 1159F MED LIST DOCD IN RCRD: CPT | Mod: CPTII,S$GLB,, | Performed by: PHYSICIAN ASSISTANT

## 2021-10-11 PROCEDURE — 86803 HEPATITIS C AB TEST: CPT | Performed by: PHYSICIAN ASSISTANT

## 2021-10-11 PROCEDURE — 3079F PR MOST RECENT DIASTOLIC BLOOD PRESSURE 80-89 MM HG: ICD-10-PCS | Mod: CPTII,S$GLB,, | Performed by: PHYSICIAN ASSISTANT

## 2021-10-11 PROCEDURE — 80053 COMPREHEN METABOLIC PANEL: CPT | Performed by: PHYSICIAN ASSISTANT

## 2021-10-11 PROCEDURE — 85025 COMPLETE CBC W/AUTO DIFF WBC: CPT | Performed by: PHYSICIAN ASSISTANT

## 2021-10-11 PROCEDURE — 3008F BODY MASS INDEX DOCD: CPT | Mod: CPTII,S$GLB,, | Performed by: PHYSICIAN ASSISTANT

## 2021-10-11 PROCEDURE — 84480 ASSAY TRIIODOTHYRONINE (T3): CPT | Performed by: PHYSICIAN ASSISTANT

## 2021-10-11 PROCEDURE — 99999 PR PBB SHADOW E&M-EST. PATIENT-LVL III: CPT | Mod: PBBFAC,,, | Performed by: PHYSICIAN ASSISTANT

## 2021-10-11 RX ORDER — ASPIRIN 81 MG/1
TABLET ORAL
COMMUNITY
End: 2022-11-22

## 2021-10-11 RX ORDER — IBUPROFEN AND FAMOTIDINE 26.6; 8 MG/1; MG/1
1 TABLET ORAL 3 TIMES DAILY PRN
Qty: 30 TABLET | Refills: 2 | Status: SHIPPED | OUTPATIENT
Start: 2021-10-11 | End: 2022-02-09

## 2021-10-11 RX ORDER — SOTALOL HYDROCHLORIDE 120 MG/1
120 TABLET ORAL 2 TIMES DAILY
COMMUNITY

## 2021-10-12 LAB
HCV AB SERPL QL IA: NEGATIVE
HIV 1+2 AB+HIV1 P24 AG SERPL QL IA: NEGATIVE
THYROPEROXIDASE IGG SERPL-ACNC: <6 IU/ML

## 2021-10-18 ENCOUNTER — TELEPHONE (OUTPATIENT)
Dept: FAMILY MEDICINE | Facility: CLINIC | Age: 26
End: 2021-10-18

## 2021-10-26 ENCOUNTER — OFFICE VISIT (OUTPATIENT)
Dept: FAMILY MEDICINE | Facility: CLINIC | Age: 26
End: 2021-10-26
Payer: COMMERCIAL

## 2021-10-26 VITALS
DIASTOLIC BLOOD PRESSURE: 86 MMHG | RESPIRATION RATE: 18 BRPM | BODY MASS INDEX: 36.92 KG/M2 | HEIGHT: 67 IN | SYSTOLIC BLOOD PRESSURE: 100 MMHG | WEIGHT: 235.25 LBS | HEART RATE: 66 BPM | OXYGEN SATURATION: 98 %

## 2021-10-26 DIAGNOSIS — Z00.00 ROUTINE GENERAL MEDICAL EXAMINATION AT A HEALTH CARE FACILITY: Primary | ICD-10-CM

## 2021-10-26 DIAGNOSIS — I47.10 SVT (SUPRAVENTRICULAR TACHYCARDIA): ICD-10-CM

## 2021-10-26 DIAGNOSIS — E78.5 HYPERLIPIDEMIA, UNSPECIFIED HYPERLIPIDEMIA TYPE: ICD-10-CM

## 2021-10-26 PROCEDURE — 3074F SYST BP LT 130 MM HG: CPT | Mod: CPTII,S$GLB,, | Performed by: STUDENT IN AN ORGANIZED HEALTH CARE EDUCATION/TRAINING PROGRAM

## 2021-10-26 PROCEDURE — 3044F PR MOST RECENT HEMOGLOBIN A1C LEVEL <7.0%: ICD-10-PCS | Mod: CPTII,S$GLB,, | Performed by: STUDENT IN AN ORGANIZED HEALTH CARE EDUCATION/TRAINING PROGRAM

## 2021-10-26 PROCEDURE — 1160F PR REVIEW ALL MEDS BY PRESCRIBER/CLIN PHARMACIST DOCUMENTED: ICD-10-PCS | Mod: CPTII,S$GLB,, | Performed by: STUDENT IN AN ORGANIZED HEALTH CARE EDUCATION/TRAINING PROGRAM

## 2021-10-26 PROCEDURE — 1159F MED LIST DOCD IN RCRD: CPT | Mod: CPTII,S$GLB,, | Performed by: STUDENT IN AN ORGANIZED HEALTH CARE EDUCATION/TRAINING PROGRAM

## 2021-10-26 PROCEDURE — 3079F PR MOST RECENT DIASTOLIC BLOOD PRESSURE 80-89 MM HG: ICD-10-PCS | Mod: CPTII,S$GLB,, | Performed by: STUDENT IN AN ORGANIZED HEALTH CARE EDUCATION/TRAINING PROGRAM

## 2021-10-26 PROCEDURE — 1160F RVW MEDS BY RX/DR IN RCRD: CPT | Mod: CPTII,S$GLB,, | Performed by: STUDENT IN AN ORGANIZED HEALTH CARE EDUCATION/TRAINING PROGRAM

## 2021-10-26 PROCEDURE — 99213 OFFICE O/P EST LOW 20 MIN: CPT | Mod: S$GLB,,, | Performed by: STUDENT IN AN ORGANIZED HEALTH CARE EDUCATION/TRAINING PROGRAM

## 2021-10-26 PROCEDURE — 3079F DIAST BP 80-89 MM HG: CPT | Mod: CPTII,S$GLB,, | Performed by: STUDENT IN AN ORGANIZED HEALTH CARE EDUCATION/TRAINING PROGRAM

## 2021-10-26 PROCEDURE — 3074F PR MOST RECENT SYSTOLIC BLOOD PRESSURE < 130 MM HG: ICD-10-PCS | Mod: CPTII,S$GLB,, | Performed by: STUDENT IN AN ORGANIZED HEALTH CARE EDUCATION/TRAINING PROGRAM

## 2021-10-26 PROCEDURE — 3008F BODY MASS INDEX DOCD: CPT | Mod: CPTII,S$GLB,, | Performed by: STUDENT IN AN ORGANIZED HEALTH CARE EDUCATION/TRAINING PROGRAM

## 2021-10-26 PROCEDURE — 99999 PR PBB SHADOW E&M-EST. PATIENT-LVL IV: CPT | Mod: PBBFAC,,, | Performed by: STUDENT IN AN ORGANIZED HEALTH CARE EDUCATION/TRAINING PROGRAM

## 2021-10-26 PROCEDURE — 99999 PR PBB SHADOW E&M-EST. PATIENT-LVL IV: ICD-10-PCS | Mod: PBBFAC,,, | Performed by: STUDENT IN AN ORGANIZED HEALTH CARE EDUCATION/TRAINING PROGRAM

## 2021-10-26 PROCEDURE — 3008F PR BODY MASS INDEX (BMI) DOCUMENTED: ICD-10-PCS | Mod: CPTII,S$GLB,, | Performed by: STUDENT IN AN ORGANIZED HEALTH CARE EDUCATION/TRAINING PROGRAM

## 2021-10-26 PROCEDURE — 1159F PR MEDICATION LIST DOCUMENTED IN MEDICAL RECORD: ICD-10-PCS | Mod: CPTII,S$GLB,, | Performed by: STUDENT IN AN ORGANIZED HEALTH CARE EDUCATION/TRAINING PROGRAM

## 2021-10-26 PROCEDURE — 99213 PR OFFICE/OUTPT VISIT, EST, LEVL III, 20-29 MIN: ICD-10-PCS | Mod: S$GLB,,, | Performed by: STUDENT IN AN ORGANIZED HEALTH CARE EDUCATION/TRAINING PROGRAM

## 2021-10-26 PROCEDURE — 3044F HG A1C LEVEL LT 7.0%: CPT | Mod: CPTII,S$GLB,, | Performed by: STUDENT IN AN ORGANIZED HEALTH CARE EDUCATION/TRAINING PROGRAM

## 2021-10-26 RX ORDER — KETOROLAC TROMETHAMINE 10 MG/1
10 TABLET, FILM COATED ORAL EVERY 6 HOURS PRN
COMMUNITY
Start: 2021-05-12 | End: 2022-02-09

## 2021-10-26 RX ORDER — ELAGOLIX AND ESTRADIOL AND NORETHISTERONE 300-1-0.5
KIT ORAL
COMMUNITY
Start: 2021-10-22 | End: 2022-04-12

## 2022-01-31 ENCOUNTER — TELEPHONE (OUTPATIENT)
Dept: NEUROLOGY | Facility: CLINIC | Age: 27
End: 2022-01-31
Payer: MEDICAID

## 2022-02-08 ENCOUNTER — PATIENT OUTREACH (OUTPATIENT)
Dept: ADMINISTRATIVE | Facility: OTHER | Age: 27
End: 2022-02-08
Payer: MEDICAID

## 2022-02-08 NOTE — PROGRESS NOTES
Health Maintenance Due   Topic Date Due    Influenza Vaccine (1) 09/01/2021     Updates were requested from care everywhere.  Chart was reviewed for overdue Proactive [facility] Encounters (JUNG) topics (CRS, Breast Cancer Screening, Eye exam)  Health Maintenance has been updated.  LINKS immunization registry triggered.  Immunizations were reconciled.

## 2022-02-09 ENCOUNTER — OFFICE VISIT (OUTPATIENT)
Dept: NEUROLOGY | Facility: CLINIC | Age: 27
End: 2022-02-09
Payer: COMMERCIAL

## 2022-02-09 VITALS
WEIGHT: 216.06 LBS | SYSTOLIC BLOOD PRESSURE: 110 MMHG | DIASTOLIC BLOOD PRESSURE: 72 MMHG | HEART RATE: 97 BPM | RESPIRATION RATE: 20 BRPM | BODY MASS INDEX: 33.91 KG/M2 | OXYGEN SATURATION: 100 % | HEIGHT: 67 IN

## 2022-02-09 DIAGNOSIS — G43.019 INTRACTABLE MIGRAINE WITHOUT AURA AND WITHOUT STATUS MIGRAINOSUS: Primary | ICD-10-CM

## 2022-02-09 DIAGNOSIS — Z90.710 S/P LAPAROSCOPIC HYSTERECTOMY: ICD-10-CM

## 2022-02-09 DIAGNOSIS — Q21.12 PFO (PATENT FORAMEN OVALE): ICD-10-CM

## 2022-02-09 DIAGNOSIS — Z86.79 HISTORY OF ATRIAL FIBRILLATION: ICD-10-CM

## 2022-02-09 DIAGNOSIS — R55 VASOVAGAL SYNCOPE: ICD-10-CM

## 2022-02-09 DIAGNOSIS — Z86.79 H/O SINUS TACHYCARDIA: ICD-10-CM

## 2022-02-09 PROCEDURE — 3008F PR BODY MASS INDEX (BMI) DOCUMENTED: ICD-10-PCS | Mod: CPTII,S$GLB,, | Performed by: NURSE PRACTITIONER

## 2022-02-09 PROCEDURE — 99999 PR PBB SHADOW E&M-EST. PATIENT-LVL III: ICD-10-PCS | Mod: PBBFAC,,, | Performed by: NURSE PRACTITIONER

## 2022-02-09 PROCEDURE — 3078F PR MOST RECENT DIASTOLIC BLOOD PRESSURE < 80 MM HG: ICD-10-PCS | Mod: CPTII,S$GLB,, | Performed by: NURSE PRACTITIONER

## 2022-02-09 PROCEDURE — 99214 OFFICE O/P EST MOD 30 MIN: CPT | Mod: S$GLB,,, | Performed by: NURSE PRACTITIONER

## 2022-02-09 PROCEDURE — 1159F MED LIST DOCD IN RCRD: CPT | Mod: CPTII,S$GLB,, | Performed by: NURSE PRACTITIONER

## 2022-02-09 PROCEDURE — 99999 PR PBB SHADOW E&M-EST. PATIENT-LVL III: CPT | Mod: PBBFAC,,, | Performed by: NURSE PRACTITIONER

## 2022-02-09 PROCEDURE — 99214 PR OFFICE/OUTPT VISIT, EST, LEVL IV, 30-39 MIN: ICD-10-PCS | Mod: S$GLB,,, | Performed by: NURSE PRACTITIONER

## 2022-02-09 PROCEDURE — 1159F PR MEDICATION LIST DOCUMENTED IN MEDICAL RECORD: ICD-10-PCS | Mod: CPTII,S$GLB,, | Performed by: NURSE PRACTITIONER

## 2022-02-09 PROCEDURE — 3074F SYST BP LT 130 MM HG: CPT | Mod: CPTII,S$GLB,, | Performed by: NURSE PRACTITIONER

## 2022-02-09 PROCEDURE — 3078F DIAST BP <80 MM HG: CPT | Mod: CPTII,S$GLB,, | Performed by: NURSE PRACTITIONER

## 2022-02-09 PROCEDURE — 1160F PR REVIEW ALL MEDS BY PRESCRIBER/CLIN PHARMACIST DOCUMENTED: ICD-10-PCS | Mod: CPTII,S$GLB,, | Performed by: NURSE PRACTITIONER

## 2022-02-09 PROCEDURE — 1160F RVW MEDS BY RX/DR IN RCRD: CPT | Mod: CPTII,S$GLB,, | Performed by: NURSE PRACTITIONER

## 2022-02-09 PROCEDURE — 3008F BODY MASS INDEX DOCD: CPT | Mod: CPTII,S$GLB,, | Performed by: NURSE PRACTITIONER

## 2022-02-09 PROCEDURE — 3074F PR MOST RECENT SYSTOLIC BLOOD PRESSURE < 130 MM HG: ICD-10-PCS | Mod: CPTII,S$GLB,, | Performed by: NURSE PRACTITIONER

## 2022-02-09 RX ORDER — UBROGEPANT 50 MG/1
50 TABLET ORAL ONCE AS NEEDED
Qty: 9 TABLET | Refills: 5 | Status: SHIPPED | OUTPATIENT
Start: 2022-02-09 | End: 2022-02-09

## 2022-02-09 RX ORDER — NORTRIPTYLINE HYDROCHLORIDE 10 MG/1
10 CAPSULE ORAL NIGHTLY
Qty: 60 CAPSULE | Refills: 4 | Status: SHIPPED | OUTPATIENT
Start: 2022-02-09 | End: 2022-04-12 | Stop reason: SDUPTHER

## 2022-02-09 NOTE — PROGRESS NOTES
"HPI:  Elizabeth Wise is a 26 y.o. female with an episode in early 2012 consistent Vasovagal syncope. Near convulsive syncope at that time with "grunting and eyes rolling back" also noted, likely due to maintained upright position. Work up revealed only Small PFO noted on bubble study but cardiology agreed this is likely vasovagal in nature  No AED needed. History of migraine with aura. S/p hysterectomy.     She presents today to reestablish care for migraines. She was last seen in 1/2020. She had an EP study last year after a loop recorder showed a run of A-fib. They attempted ablation, but was unsuccessful, and she was placed on sotalol. Her Cardiologist is Dr. Dover in [place].     She had relief of her migraines for 1 year until a couple months ago. Headaches are located bitemporally and last for hours at a time. Quality is throbbing. Severity is an 8/10. She had associated photophobia and phonophobia. Sometimes she has nausea. No numbness, autonomic, or visual complaints.    Denies triggers. No changes in her life over the past couple months. No medication changes. No stress/anxiety.     She does not sleep well at night. She takes Melatonin, which helps her to fall asleep, but she doesn't stay asleep.      She takes Exedrin migraine and Ibuprofen to abort her headaches. These agents are sometimes effective. She has responded to Ubrelvy and Duexis.     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 She lives in [place].     Review of Systems   Constitutional: Negative for fever.   HENT: Negative for nosebleeds.    Eyes: Negative for double vision.   Respiratory: Negative for shortness of breath.    Cardiovascular: Negative for chest pain and leg swelling.   Gastrointestinal: Negative for blood in stool.   Genitourinary: Negative for hematuria.   Musculoskeletal: Negative for falls.   Skin: Negative for rash.   Neurological: Positive for headaches.   Psychiatric/Behavioral: The patient has insomnia.      Exam: " "  Gen Appearance, well developed/nourished in no apparent distress  CV: 2+ distal pulses with no edema or swelling  Neuro:  MS: Awake, alert, Sustains attention. Recent/remote memory intact, Language is full to spontaneous speech/comprehension. Fund of Knowledge is full  CN: Optic discs are flat with normal vasculature, PERRL, Extraoccular movements and visual fields are full. Normal facial sensation and strength, Tongue and Palate are midline and strong. Shoulder Shrug symmetric and strong.  Motor: Normal bulk, tone, no abnormal movements. No protonator drift and 5/5 bilateral UE/LE strength  Sensory: . Romberg negative and temp and vibration intact  Cerebellar: Finger-nose, Heal and shin intact  Gait: Normal stance, no ataxia    Imaging:  3/28/12 MRI of the brain reports and images reviewed: NORMAL MRI OF THE BRAIN.  2/6/12 EEG report reviewed from [facility]: Normal    Assessment/Recommendation: Elizabeth Wise is a 26 y.o. female with an episode in early 2012 consistent Vasovagal syncope. Near consulvive syncope at that time with "grunting and eyes rolling back" also noted, likely due to maintained upright position. Work up revealed only Small PFO noted on bubble study but cardiology agreed this is likely vasovagal in nature.    I recommend:   1. Trial of Pamelor for migraine prevention.  Keep a headache log until next visit.   Elavil caused too much sedation.   -Topamax /Zonegran failed prior. Does not feel she needs a prevention medication at this time. Now S/p hysterectomy.   - Botox or CGRP antagonist could be considered next if needed.     2. Propranolol was being used due to palpitations per Cardiologist/ no syncope/ PFO likely incidentally but was not helpful for headaches  -she was taking Metoprolol, but is not on sotalol, for tachycardia/A-fib run after an EP study done last year.     3. For migraine PRN:  Start Ubrelvy prn.   -she failed Maxalt, Toradol, Exedrin, and Ibuprofen.     She has responded to " Ubrelvy and Duexis.     Maxalt PRN-- helps most of the time. Imitrex not effective.   -compazine caused drowsiness.     RTC 2 months

## 2022-02-16 ENCOUNTER — TELEPHONE (OUTPATIENT)
Dept: NEUROLOGY | Facility: CLINIC | Age: 27
End: 2022-02-16
Payer: MEDICAID

## 2022-02-16 NOTE — TELEPHONE ENCOUNTER
CLARA Iyer approved    CaseId:[id number];  Status:Approved;  Review Type:Prior Auth;  Coverage Start Date:01/17/2022;  Coverage End Date:02/16/2023;

## 2022-03-16 ENCOUNTER — PATIENT MESSAGE (OUTPATIENT)
Dept: NEUROLOGY | Facility: CLINIC | Age: 27
End: 2022-03-16
Payer: MEDICAID

## 2022-04-12 ENCOUNTER — PATIENT OUTREACH (OUTPATIENT)
Dept: ADMINISTRATIVE | Facility: OTHER | Age: 27
End: 2022-04-12
Payer: MEDICAID

## 2022-04-12 ENCOUNTER — OFFICE VISIT (OUTPATIENT)
Dept: NEUROLOGY | Facility: CLINIC | Age: 27
End: 2022-04-12
Payer: COMMERCIAL

## 2022-04-12 VITALS
HEART RATE: 68 BPM | RESPIRATION RATE: 20 BRPM | BODY MASS INDEX: 33.78 KG/M2 | SYSTOLIC BLOOD PRESSURE: 102 MMHG | DIASTOLIC BLOOD PRESSURE: 74 MMHG | WEIGHT: 215.19 LBS | HEIGHT: 67 IN

## 2022-04-12 DIAGNOSIS — G43.019 INTRACTABLE MIGRAINE WITHOUT AURA AND WITHOUT STATUS MIGRAINOSUS: ICD-10-CM

## 2022-04-12 DIAGNOSIS — R55 VASOVAGAL SYNCOPE: ICD-10-CM

## 2022-04-12 DIAGNOSIS — Z86.79 HISTORY OF ATRIAL FIBRILLATION: Primary | ICD-10-CM

## 2022-04-12 DIAGNOSIS — Q21.12 PFO (PATENT FORAMEN OVALE): ICD-10-CM

## 2022-04-12 PROCEDURE — 99214 PR OFFICE/OUTPT VISIT, EST, LEVL IV, 30-39 MIN: ICD-10-PCS | Mod: S$GLB,,, | Performed by: NURSE PRACTITIONER

## 2022-04-12 PROCEDURE — 3078F DIAST BP <80 MM HG: CPT | Mod: CPTII,S$GLB,, | Performed by: NURSE PRACTITIONER

## 2022-04-12 PROCEDURE — 1159F PR MEDICATION LIST DOCUMENTED IN MEDICAL RECORD: ICD-10-PCS | Mod: CPTII,S$GLB,, | Performed by: NURSE PRACTITIONER

## 2022-04-12 PROCEDURE — 99999 PR PBB SHADOW E&M-EST. PATIENT-LVL IV: CPT | Mod: PBBFAC,,, | Performed by: NURSE PRACTITIONER

## 2022-04-12 PROCEDURE — 3008F PR BODY MASS INDEX (BMI) DOCUMENTED: ICD-10-PCS | Mod: CPTII,S$GLB,, | Performed by: NURSE PRACTITIONER

## 2022-04-12 PROCEDURE — 3008F BODY MASS INDEX DOCD: CPT | Mod: CPTII,S$GLB,, | Performed by: NURSE PRACTITIONER

## 2022-04-12 PROCEDURE — 3074F PR MOST RECENT SYSTOLIC BLOOD PRESSURE < 130 MM HG: ICD-10-PCS | Mod: CPTII,S$GLB,, | Performed by: NURSE PRACTITIONER

## 2022-04-12 PROCEDURE — 3078F PR MOST RECENT DIASTOLIC BLOOD PRESSURE < 80 MM HG: ICD-10-PCS | Mod: CPTII,S$GLB,, | Performed by: NURSE PRACTITIONER

## 2022-04-12 PROCEDURE — 99214 OFFICE O/P EST MOD 30 MIN: CPT | Mod: S$GLB,,, | Performed by: NURSE PRACTITIONER

## 2022-04-12 PROCEDURE — 1159F MED LIST DOCD IN RCRD: CPT | Mod: CPTII,S$GLB,, | Performed by: NURSE PRACTITIONER

## 2022-04-12 PROCEDURE — 99999 PR PBB SHADOW E&M-EST. PATIENT-LVL IV: ICD-10-PCS | Mod: PBBFAC,,, | Performed by: NURSE PRACTITIONER

## 2022-04-12 PROCEDURE — 1160F RVW MEDS BY RX/DR IN RCRD: CPT | Mod: CPTII,S$GLB,, | Performed by: NURSE PRACTITIONER

## 2022-04-12 PROCEDURE — 3074F SYST BP LT 130 MM HG: CPT | Mod: CPTII,S$GLB,, | Performed by: NURSE PRACTITIONER

## 2022-04-12 PROCEDURE — 1160F PR REVIEW ALL MEDS BY PRESCRIBER/CLIN PHARMACIST DOCUMENTED: ICD-10-PCS | Mod: CPTII,S$GLB,, | Performed by: NURSE PRACTITIONER

## 2022-04-12 RX ORDER — NORTRIPTYLINE HYDROCHLORIDE 10 MG/1
20 CAPSULE ORAL NIGHTLY
Qty: 180 CAPSULE | Refills: 1 | Status: SHIPPED | OUTPATIENT
Start: 2022-04-12 | End: 2022-11-03

## 2022-04-12 RX ORDER — UBROGEPANT 50 MG/1
TABLET ORAL
COMMUNITY
Start: 2022-03-04 | End: 2022-04-12 | Stop reason: SDUPTHER

## 2022-04-12 RX ORDER — UBROGEPANT 50 MG/1
50 TABLET ORAL DAILY PRN
Qty: 9 TABLET | Refills: 5 | Status: SHIPPED | OUTPATIENT
Start: 2022-04-12 | End: 2022-09-20

## 2022-04-12 NOTE — PROGRESS NOTES
"HPI:  Elizabeth Wise is a 26 y.o. female with an episode in early 2012 consistent Vasovagal syncope. Near convulsive syncope at that time with "grunting and eyes rolling back" also noted, likely due to maintained upright position. Work up revealed only Small PFO noted on bubble study but cardiology agreed this is likely vasovagal in nature  No AED needed. History of migraine with aura. S/p hysterectomy. She had an EP study in 2021 after a loop recorder showed a run of A-fib. They attempted ablation, but was unsuccessful, and she was placed on sotalol.     She presents today for a follow up visit. She was placed on Pamelor for migraine prevention at her last visit, which has reduced her migraines greatly. They now occur once every 2 weeks. Unclear what triggers are. She is tolerating Pamelor well.     She is sleeping better with Pamelor.     She was also placed on Ubrelvy to abort migraines, which has been effective.     She will be seeing Dr. Reyez in [place]/Cardiology for A-fib history.     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. She lives in [place].     Review of Systems   Constitutional: Negative for fever.   HENT: Negative for nosebleeds.    Eyes: Negative for double vision.   Respiratory: Negative for shortness of breath.    Cardiovascular: Negative for chest pain and leg swelling.   Gastrointestinal: Negative for blood in stool.   Genitourinary: Negative for hematuria.   Musculoskeletal: Negative for falls.   Skin: Negative for rash.   Neurological: Positive for headaches.   Psychiatric/Behavioral: The patient does not have insomnia.      Exam:   Gen Appearance, well developed/nourished in no apparent distress  CV: 2+ distal pulses with no edema or swelling  Neuro:  MS: Awake, alert, Sustains attention. Recent/remote memory intact, Language is full to spontaneous speech/comprehension. Fund of Knowledge is full  CN: Optic discs are flat with normal vasculature, PERRL, Extraoccular movements and " "visual fields are full. Normal facial sensation and strength, Tongue and Palate are midline and strong. Shoulder Shrug symmetric and strong.  Motor: Normal bulk, tone, no abnormal movements. No protonator drift and 5/5 bilateral UE/LE strength  Sensory: . Romberg negative and temp and vibration intact  Cerebellar: Finger-nose, Heal and shin intact  Gait: Normal stance, no ataxia    Imaging:  3/28/12 MRI of the brain reports and images reviewed: NORMAL MRI OF THE BRAIN.  2/6/12 EEG report reviewed from [facility]: Normal    Assessment/Recommendation: Elizabeth Wise is a 26 y.o. female with an episode in early 2012 consistent Vasovagal syncope. Near consulvive syncope at that time with "grunting and eyes rolling back" also noted, likely due to maintained upright position. Work up revealed only Small PFO noted on bubble study but cardiology agreed this is likely vasovagal in nature.    I recommend:   1. Continue Pamelor for migraine prevention, as this is effective.  Elavil caused too much sedation prior.   -Topamax /Zonegran failed prior. Does not feel she needs a prevention medication at this time. Now S/p hysterectomy.   - Botox or CGRP antagonist could be considered next if needed.     2. Propranolol was being used due to palpitations per Cardiologist/ no syncope/ PFO likely incidentally but was not helpful for headaches  -she was taking Metoprolol, but is not on sotalol, for tachycardia/A-fib run after an EP study done last year.     3. For migraine PRN:  Continue Ubrelvy prn, which is effective.   -she failed Maxalt, Toradol, Exedrin, and Ibuprofen.     She has responded Duexis prior.     Maxalt PRN-- helps most of the time. Imitrex not effective.   -compazine caused drowsiness.     RTC 2 months              "

## 2022-04-21 ENCOUNTER — OFFICE VISIT (OUTPATIENT)
Dept: URGENT CARE | Facility: CLINIC | Age: 27
End: 2022-04-21
Payer: COMMERCIAL

## 2022-04-21 ENCOUNTER — TELEPHONE (OUTPATIENT)
Dept: URGENT CARE | Facility: CLINIC | Age: 27
End: 2022-04-21

## 2022-04-21 VITALS
SYSTOLIC BLOOD PRESSURE: 130 MMHG | WEIGHT: 216 LBS | HEIGHT: 67 IN | HEART RATE: 91 BPM | TEMPERATURE: 99 F | RESPIRATION RATE: 18 BRPM | DIASTOLIC BLOOD PRESSURE: 85 MMHG | BODY MASS INDEX: 33.9 KG/M2 | OXYGEN SATURATION: 98 %

## 2022-04-21 DIAGNOSIS — J01.90 ACUTE BACTERIAL SINUSITIS: ICD-10-CM

## 2022-04-21 DIAGNOSIS — J01.90 ACUTE BACTERIAL SINUSITIS: Primary | ICD-10-CM

## 2022-04-21 DIAGNOSIS — B96.89 ACUTE BACTERIAL SINUSITIS: Primary | ICD-10-CM

## 2022-04-21 DIAGNOSIS — B96.89 ACUTE BACTERIAL SINUSITIS: ICD-10-CM

## 2022-04-21 PROCEDURE — 99213 PR OFFICE/OUTPT VISIT, EST, LEVL III, 20-29 MIN: ICD-10-PCS | Mod: S$GLB,,, | Performed by: PHYSICIAN ASSISTANT

## 2022-04-21 PROCEDURE — 3008F BODY MASS INDEX DOCD: CPT | Mod: CPTII,S$GLB,, | Performed by: PHYSICIAN ASSISTANT

## 2022-04-21 PROCEDURE — 1160F PR REVIEW ALL MEDS BY PRESCRIBER/CLIN PHARMACIST DOCUMENTED: ICD-10-PCS | Mod: CPTII,S$GLB,, | Performed by: PHYSICIAN ASSISTANT

## 2022-04-21 PROCEDURE — 99213 OFFICE O/P EST LOW 20 MIN: CPT | Mod: S$GLB,,, | Performed by: PHYSICIAN ASSISTANT

## 2022-04-21 PROCEDURE — 3075F PR MOST RECENT SYSTOLIC BLOOD PRESS GE 130-139MM HG: ICD-10-PCS | Mod: CPTII,S$GLB,, | Performed by: PHYSICIAN ASSISTANT

## 2022-04-21 PROCEDURE — 1159F PR MEDICATION LIST DOCUMENTED IN MEDICAL RECORD: ICD-10-PCS | Mod: CPTII,S$GLB,, | Performed by: PHYSICIAN ASSISTANT

## 2022-04-21 PROCEDURE — 3008F PR BODY MASS INDEX (BMI) DOCUMENTED: ICD-10-PCS | Mod: CPTII,S$GLB,, | Performed by: PHYSICIAN ASSISTANT

## 2022-04-21 PROCEDURE — 1160F RVW MEDS BY RX/DR IN RCRD: CPT | Mod: CPTII,S$GLB,, | Performed by: PHYSICIAN ASSISTANT

## 2022-04-21 PROCEDURE — 3079F DIAST BP 80-89 MM HG: CPT | Mod: CPTII,S$GLB,, | Performed by: PHYSICIAN ASSISTANT

## 2022-04-21 PROCEDURE — 3079F PR MOST RECENT DIASTOLIC BLOOD PRESSURE 80-89 MM HG: ICD-10-PCS | Mod: CPTII,S$GLB,, | Performed by: PHYSICIAN ASSISTANT

## 2022-04-21 PROCEDURE — 1159F MED LIST DOCD IN RCRD: CPT | Mod: CPTII,S$GLB,, | Performed by: PHYSICIAN ASSISTANT

## 2022-04-21 PROCEDURE — 3075F SYST BP GE 130 - 139MM HG: CPT | Mod: CPTII,S$GLB,, | Performed by: PHYSICIAN ASSISTANT

## 2022-04-21 RX ORDER — IPRATROPIUM BROMIDE 42 UG/1
2 SPRAY, METERED NASAL 2 TIMES DAILY
Qty: 15 ML | Refills: 0 | Status: SHIPPED | OUTPATIENT
Start: 2022-04-21 | End: 2022-06-01

## 2022-04-21 RX ORDER — DEXTROMETHORPHAN HBR, GUAIFENESIN AND PSEUDOEPHEDRINE HCL 60; 380; 20 MG/1; MG/1; MG/1
1 TABLET ORAL EVERY 6 HOURS
Qty: 20 TABLET | Refills: 0 | Status: SHIPPED | OUTPATIENT
Start: 2022-04-21 | End: 2022-06-01

## 2022-04-21 RX ORDER — AMOXICILLIN AND CLAVULANATE POTASSIUM 875; 125 MG/1; MG/1
1 TABLET, FILM COATED ORAL EVERY 12 HOURS
Qty: 14 TABLET | Refills: 0 | Status: SHIPPED | OUTPATIENT
Start: 2022-04-21 | End: 2022-04-28

## 2022-04-21 RX ORDER — DEXTROMETHORPHAN HBR, GUAIFENESIN AND PSEUDOEPHEDRINE HCL 60; 380; 20 MG/1; MG/1; MG/1
1 TABLET ORAL EVERY 6 HOURS
Qty: 20 TABLET | Refills: 0 | Status: SHIPPED | OUTPATIENT
Start: 2022-04-21 | End: 2022-04-21 | Stop reason: SDUPTHER

## 2022-04-21 NOTE — TELEPHONE ENCOUNTER
States needs Rx for poly vent sent to different pharmacy. [facility] did not have in stock. Will resend

## 2022-04-21 NOTE — PROGRESS NOTES
"Subjective:       Patient ID: Elizabeth Wise is a 26 y.o. female.    Vitals:  height is 5' 7" (1.702 m) and weight is 98 kg (216 lb). Her oral temperature is 98.5 °F (36.9 °C). Her blood pressure is 130/85 and her pulse is 91. Her respiration is 18 and oxygen saturation is 98%.     Chief Complaint: Nasal Congestion (Congestion, sore throat, itchy ears, headache - Entered by patient)    Sinus Problem  This is a new problem. The current episode started yesterday. The problem has been gradually worsening since onset. There has been no fever. Her pain is at a severity of 4/10. The pain is mild. Associated symptoms include congestion, coughing, ear pain (right ear), headaches, sinus pressure and a sore throat. (Runny nose, loss of appetite) Treatments tried: sudafed. The treatment provided no relief.       HENT: Positive for ear pain (right ear), congestion, sinus pressure and sore throat.    Respiratory: Positive for cough.    Neurological: Positive for headaches.       Objective:      Physical Exam   Constitutional: She is oriented to person, place, and time. She appears well-developed. She is cooperative.  Non-toxic appearance. She does not appear ill. No distress.   HENT:   Head: Normocephalic and atraumatic.   Ears:   Right Ear: Hearing, external ear and ear canal normal. Tympanic membrane is not erythematous, not retracted and not bulging. No middle ear effusion. impacted cerumen  Left Ear: Hearing, external ear and ear canal normal. Tympanic membrane is not erythematous, not retracted and not bulging.  No middle ear effusion. impacted cerumen  Nose: Mucosal edema, rhinorrhea and congestion present. Right sinus exhibits maxillary sinus tenderness and frontal sinus tenderness. Left sinus exhibits maxillary sinus tenderness and frontal sinus tenderness.   Mouth/Throat: Uvula is midline, oropharynx is clear and moist and mucous membranes are normal. Mucous membranes are moist. Mucous membranes are not dry. No " trismus in the jaw. No uvula swelling. No oropharyngeal exudate, posterior oropharyngeal edema, posterior oropharyngeal erythema, tonsillar abscesses or cobblestoning. No tonsillar exudate. Oropharynx is clear.   Eyes: Conjunctivae and lids are normal. No scleral icterus.   Neck: Phonation normal. Neck supple.   Cardiovascular: Normal rate, regular rhythm and normal heart sounds.   No murmur heard.Exam reveals no gallop and no friction rub.   Pulmonary/Chest: Effort normal and breath sounds normal. No stridor. No respiratory distress. She has no decreased breath sounds. She has no wheezes. She has no rhonchi. She has no rales.   Abdominal: Normal appearance.   Neurological: She is alert and oriented to person, place, and time. Coordination normal.   Skin: Skin is intact, not diaphoretic and not pale.   Psychiatric: Her speech is normal and behavior is normal. Judgment and thought content normal.   Nursing note and vitals reviewed.        Assessment:       1. Acute bacterial sinusitis          Plan:         Acute bacterial sinusitis  -     ipratropium (ATROVENT) 42 mcg (0.06 %) nasal spray; 2 sprays by Nasal route 2 (two) times daily.  Dispense: 15 mL; Refill: 0  -     pseudoephedrine-DM-guaiFENesin (POLY-VENT DM) 60- mg Tab; Take 1 tablet by mouth every 6 (six) hours.  Dispense: 20 tablet; Refill: 0  -     amoxicillin-clavulanate 875-125mg (AUGMENTIN) 875-125 mg per tablet; Take 1 tablet by mouth every 12 (twelve) hours. for 7 days  Dispense: 14 tablet; Refill: 0    Discussed with patient the importance of f/u with their primary care provider. Urged to go to the ER for any worsening signs or symptoms.

## 2022-06-01 ENCOUNTER — OFFICE VISIT (OUTPATIENT)
Dept: URGENT CARE | Facility: CLINIC | Age: 27
End: 2022-06-01
Payer: COMMERCIAL

## 2022-06-01 VITALS
OXYGEN SATURATION: 97 % | WEIGHT: 216 LBS | HEART RATE: 75 BPM | DIASTOLIC BLOOD PRESSURE: 84 MMHG | TEMPERATURE: 98 F | RESPIRATION RATE: 16 BRPM | HEIGHT: 67 IN | BODY MASS INDEX: 33.9 KG/M2 | SYSTOLIC BLOOD PRESSURE: 117 MMHG

## 2022-06-01 DIAGNOSIS — H65.02 ACUTE SEROUS OTITIS MEDIA OF LEFT EAR, RECURRENCE NOT SPECIFIED: Primary | ICD-10-CM

## 2022-06-01 DIAGNOSIS — J32.9 SINUSITIS, UNSPECIFIED CHRONICITY, UNSPECIFIED LOCATION: ICD-10-CM

## 2022-06-01 DIAGNOSIS — H61.23 BILATERAL IMPACTED CERUMEN: ICD-10-CM

## 2022-06-01 DIAGNOSIS — H60.503 ACUTE OTITIS EXTERNA OF BOTH EARS, UNSPECIFIED TYPE: ICD-10-CM

## 2022-06-01 PROCEDURE — 1160F PR REVIEW ALL MEDS BY PRESCRIBER/CLIN PHARMACIST DOCUMENTED: ICD-10-PCS | Mod: CPTII,S$GLB,, | Performed by: NURSE PRACTITIONER

## 2022-06-01 PROCEDURE — 99214 OFFICE O/P EST MOD 30 MIN: CPT | Mod: 25,S$GLB,, | Performed by: NURSE PRACTITIONER

## 2022-06-01 PROCEDURE — 69209 REMOVE IMPACTED EAR WAX UNI: CPT | Mod: 50,S$GLB,, | Performed by: NURSE PRACTITIONER

## 2022-06-01 PROCEDURE — 3079F DIAST BP 80-89 MM HG: CPT | Mod: CPTII,S$GLB,, | Performed by: NURSE PRACTITIONER

## 2022-06-01 PROCEDURE — 3008F BODY MASS INDEX DOCD: CPT | Mod: CPTII,S$GLB,, | Performed by: NURSE PRACTITIONER

## 2022-06-01 PROCEDURE — 1159F MED LIST DOCD IN RCRD: CPT | Mod: CPTII,S$GLB,, | Performed by: NURSE PRACTITIONER

## 2022-06-01 PROCEDURE — 1159F PR MEDICATION LIST DOCUMENTED IN MEDICAL RECORD: ICD-10-PCS | Mod: CPTII,S$GLB,, | Performed by: NURSE PRACTITIONER

## 2022-06-01 PROCEDURE — 1160F RVW MEDS BY RX/DR IN RCRD: CPT | Mod: CPTII,S$GLB,, | Performed by: NURSE PRACTITIONER

## 2022-06-01 PROCEDURE — 3074F PR MOST RECENT SYSTOLIC BLOOD PRESSURE < 130 MM HG: ICD-10-PCS | Mod: CPTII,S$GLB,, | Performed by: NURSE PRACTITIONER

## 2022-06-01 PROCEDURE — 69209 EAR CERUMEN REMOVAL: ICD-10-PCS | Mod: 50,S$GLB,, | Performed by: NURSE PRACTITIONER

## 2022-06-01 PROCEDURE — 3079F PR MOST RECENT DIASTOLIC BLOOD PRESSURE 80-89 MM HG: ICD-10-PCS | Mod: CPTII,S$GLB,, | Performed by: NURSE PRACTITIONER

## 2022-06-01 PROCEDURE — 3008F PR BODY MASS INDEX (BMI) DOCUMENTED: ICD-10-PCS | Mod: CPTII,S$GLB,, | Performed by: NURSE PRACTITIONER

## 2022-06-01 PROCEDURE — 3074F SYST BP LT 130 MM HG: CPT | Mod: CPTII,S$GLB,, | Performed by: NURSE PRACTITIONER

## 2022-06-01 PROCEDURE — 99214 PR OFFICE/OUTPT VISIT, EST, LEVL IV, 30-39 MIN: ICD-10-PCS | Mod: 25,S$GLB,, | Performed by: NURSE PRACTITIONER

## 2022-06-01 RX ORDER — AMOXICILLIN AND CLAVULANATE POTASSIUM 875; 125 MG/1; MG/1
1 TABLET, FILM COATED ORAL 2 TIMES DAILY
Qty: 20 TABLET | Refills: 0 | Status: SHIPPED | OUTPATIENT
Start: 2022-06-01 | End: 2022-06-11

## 2022-06-01 RX ORDER — CIPROFLOXACIN AND DEXAMETHASONE 3; 1 MG/ML; MG/ML
4 SUSPENSION/ DROPS AURICULAR (OTIC) 2 TIMES DAILY
Qty: 7.5 ML | Refills: 0 | Status: SHIPPED | OUTPATIENT
Start: 2022-06-01 | End: 2022-06-06

## 2022-06-01 NOTE — PROGRESS NOTES
"Subjective:       Patient ID: Elizabeth Wise is a 26 y.o. female.    Vitals:  height is 5' 7" (1.702 m) and weight is 98 kg (216 lb). Her tympanic temperature is 98.1 °F (36.7 °C). Her blood pressure is 117/84 and her pulse is 75. Her respiration is 16 and oxygen saturation is 97%.     Chief Complaint: Cough (PT presents today w/ nonproductive cough, bilateral ear clog, and nasal congestion x 2 days. )    25 y/o female here with sinus congestion, cough and ear pain. Going to beach soon and wants to make sure everything is ok. Has recurrent problems with ears.     Cough  This is a new problem. The current episode started in the past 7 days. The problem has been gradually worsening. The cough is non-productive. Associated symptoms include ear pain, headaches, nasal congestion and rhinorrhea. Pertinent negatives include no eye redness, fever, rash, sore throat or shortness of breath. Nothing aggravates the symptoms. Treatments tried: dimetapp. The treatment provided no relief.   Otalgia   There is pain in both ears. The current episode started in the past 7 days. The problem occurs constantly. The problem has been gradually worsening. There has been no fever. The pain is moderate. Associated symptoms include coughing, headaches and rhinorrhea. Pertinent negatives include no abdominal pain, diarrhea, ear discharge, neck pain, rash, sore throat or vomiting. Treatments tried: Dimetap  The treatment provided no relief.       Constitution: Negative for fever.   HENT: Positive for ear pain, congestion and sinus pressure. Negative for ear discharge and sore throat.    Neck: Negative for neck pain and neck stiffness.   Eyes: Negative for eye discharge, eye pain and eye redness.   Respiratory: Positive for cough. Negative for sputum production and shortness of breath.    Gastrointestinal: Negative for abdominal pain, nausea, vomiting and diarrhea.   Musculoskeletal: Negative for pain and trauma.   Skin: Negative for rash. "   Allergic/Immunologic: Positive for immunizations up-to-date.   Neurological: Positive for headaches. Negative for altered mental status.   Psychiatric/Behavioral: Negative for altered mental status and confusion.       Objective:      Physical Exam   Constitutional: She is oriented to person, place, and time. She appears well-developed. She is cooperative.  Non-toxic appearance. No distress.   HENT:   Head: Normocephalic and atraumatic.   Ears:   Right Ear: External ear and ear canal normal. There is swelling (and erythema after cerumen removal) and cerumen present. A middle ear effusion is present.   Left Ear: External ear and ear canal normal. There is cerumen present. A middle ear effusion is present.   Nose: Mucosal edema and rhinorrhea present. No nasal deformity. No epistaxis.   Mouth/Throat: Uvula is midline, oropharynx is clear and moist and mucous membranes are normal. No trismus in the jaw. Normal dentition. No uvula swelling. No oropharyngeal exudate, posterior oropharyngeal edema or posterior oropharyngeal erythema.   Eyes: Conjunctivae and lids are normal. No scleral icterus.   Neck: Trachea normal and phonation normal. Neck supple. No edema present. No erythema present. No neck rigidity present.   Cardiovascular: Normal rate, regular rhythm, normal heart sounds and normal pulses.   Pulmonary/Chest: Effort normal and breath sounds normal. No respiratory distress. She has no decreased breath sounds. She has no rhonchi.   Abdominal: Normal appearance.   Musculoskeletal: Normal range of motion.         General: No deformity. Normal range of motion.   Neurological: She is alert and oriented to person, place, and time. She exhibits normal muscle tone. Coordination normal.   Skin: Skin is warm, dry, intact, not diaphoretic and not pale.   Psychiatric: Her speech is normal and behavior is normal. Judgment and thought content normal.   Nursing note and vitals reviewed.        Assessment:       1. Acute serous  otitis media of left ear, recurrence not specified    2. Acute otitis externa of both ears, unspecified type    3. Bilateral impacted cerumen    4. Sinusitis, unspecified chronicity, unspecified location          Plan:       Ear Cerumen Removal    Date/Time: 6/1/2022 10:00 AM  Performed by: Nessa Garcia NP  Authorized by: Nessa Garcia NP       Local anesthetic:  None  Ceruminolytics applied: Ceruminolytics applied prior to the procedure    Location details:  Both ears  Procedure type: irrigation    Cerumen  Removal Results:  Cerumen completely removed  Patient tolerance:  Patient tolerated the procedure well with no immediate complications          Acute serous otitis media of left ear, recurrence not specified  -     amoxicillin-clavulanate 875-125mg (AUGMENTIN) 875-125 mg per tablet; Take 1 tablet by mouth 2 (two) times daily. for 10 days  Dispense: 20 tablet; Refill: 0    Acute otitis externa of both ears, unspecified type  -     ciprofloxacin-dexamethasone 0.3-0.1% (CIPRODEX) 0.3-0.1 % DrpS; Place 4 drops into both ears 2 (two) times daily. for 5 days  Dispense: 7.5 mL; Refill: 0    Bilateral impacted cerumen  -     Ear Cerumen Removal    Sinusitis, unspecified chronicity, unspecified location  -     amoxicillin-clavulanate 875-125mg (AUGMENTIN) 875-125 mg per tablet; Take 1 tablet by mouth 2 (two) times daily. for 10 days  Dispense: 20 tablet; Refill: 0         Will treat sinusitis symptomatically for now and Otitis externa with abx ear drops. If symptoms progressively worsen or do not improve at all with symptomatic treatment, patient instructed to start antibiotics given on printed prescription at this visit.        Patient Instructions       The following are suggestions to help you with upper respiratory symptoms.    Congestion:    Nasal Saline  Nasal saline is available over the counter. There are several different commercial preparations such as Ocean spray and Ayr spray. There is no limit on the use of  Nasal saline. Saline is used by snorting the mist up into the nose then later gently blowing the nose to get rid of any secretions that it has loosened.     Mucous Thinners and Decongestants  Mucous thinners and decongestants are used to shrink down the tissues and promote sinus drainage. There are multiple prescription and over-the-counter medications available. A mucous thinner will tend to be drying unless you are also drinking plenty of water when taking these. If you have high blood pressure, it is very important to monitor your pressure while on decongestants. The mucous thinner/decongestant combinations are typically given twice per day. However, some people will be unable to tolerate these at night and should only take them once per day.    Decongestant Nasal Sprays  Over-the-counter decongestant nasal spray such as Afrin, may be helpful as an initial step in treating upper respiratory infections. This spray can be used for up to approximately 3 days and is used no more than twice per day. Topical nasal decongestant spray for longer than 5 days will result in a physical addiction, in which the nasal lining will become significantly swollen and irritated until the spray is used again. May cause elevated blood pressure    Use pseudoephedrine (behind the counter)  for sinus pressure and congestion- Pseudoephedrine 30 mg up to 240 mg /day. Common brands include Sudafed, Zephrex-D Wal-phed.  Warning: It can raise your blood pressure and give you palpitations, avoid with history of high blood pressure, palpitations, and severe cardiac disease.  Coricidin HBP is okay to use if you have high blood pressure.     Nasal Steroids  Nasal steroid medications such as Flonase are useful for upper respiratory infections, allergies, and sensitivities to airborne irritants. Unfortunately, they do not begin to work for 1-2 days, and they do not reach their maximum benefit for approximately 2-3 weeks. Initial therapy is  typically 2 puffs per nostril twice per day. This should be used for only a few days, then the maintenance dosage is one or two puffs per nostril once per day. This can be done at any time of the day. The most effective way to use any nasal medication is to look down at your toes when spraying it in. Aim slightly away from the septum (dividing plate between the nostrils), and gently inhale. This ensures that the spray will go into the sinus cavities and not straight up into the nose. A good way to avoid spraying onto the septum is to use the right hand to spray into the left nostril, and vice versa for the right nostril. Occasionally, nasal steroids can increase the risk of nosebleeds, but in general they are very well tolerated. If you develop a bloody nose, stop using the medication immediately.    Clear Runny Nose/Allergic Rhinitis:  Use an antihistamine to help dry you out.   Antihistamines  Antihistamines are available both over-the-counter and as a prescription. There are also various decongestant and antihistamine combinations available such as Claritin, Allegra, and Zyrtec. It is best to take any antihistamine-decongestant combination in the morning to avoid insomnia. Zyrtec should probably be taken at night, in order to reduce the chance of sleepiness during the daytime. If there is a significant infection present and secretions are already thickened, it is recommended to discontinue antihistamines and use a mucous thinner/decongestant combination.      Oral Steroids  Oral steroids can be used with more sever infections. Often, they are the only medications that will reduce the symptoms of pressure and allow the nasal sinuses to drain. These are best taken on a full stomach and earlier in the day is better. They may give you some irritability, stomach upset, or hyperactivity. This can also interfere with sleep.     A person who has high blood pressure or diabetes should be very careful to monitor their  blood pressure or blood glucose while taking steroids. Steroids can have multiple side effects especially when taken long-term. Short-term doses are usually very well tolerated and extremely effective in controlling the symptoms associated with acute and chronic sinus infections and severe allergies. The use of steroids for greater than approximately seven days requires a tapering down in order to discontinue them. You should not abruptly stop your steroid if you have been taking the same dose for greater than one week.    Antibiotic Treatment  Finally, when all of these other measures have failed, and a bacterial infection is present, an antibiotic will be prescribed. The most common symptoms of acute sinusitis of a bacterial nature are pain, pressure, and thick and colored nasal drainage. However, not all colored drainage means that there is a bacterial infection present. According to the Center for Disease Control, only 2% of colds will progress to result in bacterial sinusitis. Most upper respiratory infections should NOT be treated with antibiotics. Antibiotics should be reserved for upper respiratory infections which last longer than 10 days, or which worsen after 5 to 7 days of treatment. The use of antibiotics for nonbacterial upper respiratory infections has resulted in a severe problem with the emergence of bacteria which are resistant to multiple forms of antibiotics, and some bacteria are currently only treatable with intravenous antibiotics.    Body Aches/Pains/Fever  For patients who are not allergic to and are not on anticoagulants, you can alternate Tylenol every 4 hours and Motrin every 6 hours for fever above 100.4F and/or pain.  For patients who are allergic or intolerant to NSAIDS, have gastritis, gastric ulcers, or history of GI bleeds, are pregnant, or are on anticoagulant therapy, you can take Tylenol every 4 hours as needed for fever above 100.4F and/or pain.     Maintain adequate hydration -   Rest and keep yourself/patient well hydrated. For adults, it is recommended to drink at least 8 glasses of water daily.  This may help thin secretions and soothe the respiratory mucosa.     Sore Throat  Perform warm, salt water gargles (1/2 tsp salt to 1 cup warm water) to help reduce inflammation and throat discomfort. Chloraseptic sprays and throat lozenges will also help with your throat pain.    COUGH  A viral cough may linger for 3 to 4 weeks but should steadily improve over time. Coughing is the body's natural way to clear mucus and help get rid of bacteria and viruses. Therefore, cough suppressants are usually not recommended.      Use mucinex (guaifenisin) up to 2400mg/day to help clear and break up/loosen mucus/congestion from the chest when you have a cold or flu.      Common cough suppressants include the ingredient dextromethorphan or DM, (such as Mucinex DM) available over-the-counter and can be used for cough to stop the tickle in the back of your throat.      ? Honey may be beneficial, especially on nocturnal cough 1 to 2 teaspoons can be taken straight or diluted in tea, juice or other liquid.    The antioxidants in honey are an important contributor to its decongestant properties. Generally speaking, darker honey contains more antioxidants. Buckwheat and avocado honey are particularly good choices. If these honeys are not available in your area, choose the darkest honey you can find.    Take all medications as directed. If you have been prescribed antibiotics, make sure to complete them.     If your condition fails to improve in a timely manner, you should receive another evaluation by your Primary Care Provider to discuss your concerns or return to urgent care for a recheck.      **You must understand that you have received Urgent Care treatment only and that you may be released before all of your medical problems are known or treated. You, the patient, are responsible to arrange for follow-up  care as instructed. If your condition worsens at any time, you should report immediately to your nearest Emergency Department for further evaluation.          Ear Infection    General Information    An ear infection can cause ear pain and fever. You might also have trouble hearing from fluid buildup in the middle ear behind the eardrum.  Most ear infections are caused by viruses, but some are caused by bacteria. The doctor will wait to see if you get better on your own if they think the cause is a virus. The doctor will order antibiotic if they think the cause is a bacteria. Antibiotics kill bacteria, but they do not work on viruses.  If the doctor orders antibiotics, be sure to take all of them, even if you start to feel better.  What care is needed at home?   · Call your regular doctor to let them know you were at Urgent Care. Make a follow-up appointment if you were told to.  · Do not put anything in your ear unless it was ordered by the doctor.  · You may want to take medicines like ibuprofen, naproxen, or acetaminophen to help with pain.  When do I need to call the doctor?   · Your symptoms are not getting better in 2 to 3 days.  · You continue to have problems hearing after 2 to 3 weeks.  · You have a fever of 100.4°F (38°C) or higher or chills.  · You have discharge or fluid coming from your ear.  · You have new or worsening symptoms.  Last Reviewed Date   2020-12-16  Consumer Information Use and Disclaimer   This information is not specific medical advice and does not replace information you receive from your health care provider. This is only a brief summary of general information. It does NOT include all information about conditions, illnesses, injuries, tests, procedures, treatments, therapies, discharge instructions or life-style choices that may apply to you. You must talk with your health care provider for complete information about your health and treatment options. This information should not be used  to decide whether or not to accept your health care providers advice, instructions or recommendations. Only your health care provider has the knowledge and training to provide advice that is right for you.  Copyright   Copyright © 2021 [employer] and its affiliates and/or licensors. All rights reserved.         Medical Decision Making:   History:   Old Medical Records: I decided to obtain old medical records.  Old Records Summarized: records from clinic visits.

## 2022-06-01 NOTE — PROCEDURES
Ear Cerumen Removal    Date/Time: 6/1/2022 10:00 AM  Performed by: Nessa Garcia NP  Authorized by: Nessa Garcia NP       Local anesthetic:  None  Ceruminolytics applied: Ceruminolytics applied prior to the procedure    Location details:  Both ears  Procedure type: irrigation    Cerumen  Removal Results:  Cerumen completely removed  Patient tolerance:  Patient tolerated the procedure well with no immediate complications

## 2022-11-22 ENCOUNTER — OFFICE VISIT (OUTPATIENT)
Dept: NEUROLOGY | Facility: CLINIC | Age: 27
End: 2022-11-22
Payer: COMMERCIAL

## 2022-11-22 VITALS
SYSTOLIC BLOOD PRESSURE: 118 MMHG | WEIGHT: 209.88 LBS | BODY MASS INDEX: 32.94 KG/M2 | HEIGHT: 67 IN | DIASTOLIC BLOOD PRESSURE: 74 MMHG | HEART RATE: 72 BPM | OXYGEN SATURATION: 98 % | RESPIRATION RATE: 12 BRPM

## 2022-11-22 DIAGNOSIS — Z86.79 H/O SINUS TACHYCARDIA: Primary | ICD-10-CM

## 2022-11-22 DIAGNOSIS — G43.019 INTRACTABLE MIGRAINE WITHOUT AURA AND WITHOUT STATUS MIGRAINOSUS: ICD-10-CM

## 2022-11-22 DIAGNOSIS — Q21.12 PFO (PATENT FORAMEN OVALE): ICD-10-CM

## 2022-11-22 DIAGNOSIS — R55 VASOVAGAL SYNCOPE: ICD-10-CM

## 2022-11-22 DIAGNOSIS — Z86.79 HISTORY OF ATRIAL FIBRILLATION: ICD-10-CM

## 2022-11-22 PROCEDURE — 99999 PR PBB SHADOW E&M-EST. PATIENT-LVL III: CPT | Mod: PBBFAC,,, | Performed by: NURSE PRACTITIONER

## 2022-11-22 PROCEDURE — 1160F PR REVIEW ALL MEDS BY PRESCRIBER/CLIN PHARMACIST DOCUMENTED: ICD-10-PCS | Mod: CPTII,S$GLB,, | Performed by: NURSE PRACTITIONER

## 2022-11-22 PROCEDURE — 99214 OFFICE O/P EST MOD 30 MIN: CPT | Mod: S$GLB,,, | Performed by: NURSE PRACTITIONER

## 2022-11-22 PROCEDURE — 99999 PR PBB SHADOW E&M-EST. PATIENT-LVL III: ICD-10-PCS | Mod: PBBFAC,,, | Performed by: NURSE PRACTITIONER

## 2022-11-22 PROCEDURE — 99214 PR OFFICE/OUTPT VISIT, EST, LEVL IV, 30-39 MIN: ICD-10-PCS | Mod: S$GLB,,, | Performed by: NURSE PRACTITIONER

## 2022-11-22 PROCEDURE — 3008F BODY MASS INDEX DOCD: CPT | Mod: CPTII,S$GLB,, | Performed by: NURSE PRACTITIONER

## 2022-11-22 PROCEDURE — 1160F RVW MEDS BY RX/DR IN RCRD: CPT | Mod: CPTII,S$GLB,, | Performed by: NURSE PRACTITIONER

## 2022-11-22 PROCEDURE — 3078F PR MOST RECENT DIASTOLIC BLOOD PRESSURE < 80 MM HG: ICD-10-PCS | Mod: CPTII,S$GLB,, | Performed by: NURSE PRACTITIONER

## 2022-11-22 PROCEDURE — 1159F MED LIST DOCD IN RCRD: CPT | Mod: CPTII,S$GLB,, | Performed by: NURSE PRACTITIONER

## 2022-11-22 PROCEDURE — 3074F SYST BP LT 130 MM HG: CPT | Mod: CPTII,S$GLB,, | Performed by: NURSE PRACTITIONER

## 2022-11-22 PROCEDURE — 1159F PR MEDICATION LIST DOCUMENTED IN MEDICAL RECORD: ICD-10-PCS | Mod: CPTII,S$GLB,, | Performed by: NURSE PRACTITIONER

## 2022-11-22 PROCEDURE — 3074F PR MOST RECENT SYSTOLIC BLOOD PRESSURE < 130 MM HG: ICD-10-PCS | Mod: CPTII,S$GLB,, | Performed by: NURSE PRACTITIONER

## 2022-11-22 PROCEDURE — 3008F PR BODY MASS INDEX (BMI) DOCUMENTED: ICD-10-PCS | Mod: CPTII,S$GLB,, | Performed by: NURSE PRACTITIONER

## 2022-11-22 PROCEDURE — 3078F DIAST BP <80 MM HG: CPT | Mod: CPTII,S$GLB,, | Performed by: NURSE PRACTITIONER

## 2022-11-22 RX ORDER — SUCRALFATE 1 G/1
1 TABLET ORAL 4 TIMES DAILY
COMMUNITY
Start: 2022-11-14

## 2022-11-22 RX ORDER — HYDROGEN PEROXIDE 3 %
20 SOLUTION, NON-ORAL MISCELLANEOUS DAILY
COMMUNITY

## 2022-11-22 RX ORDER — ROSUVASTATIN CALCIUM 10 MG/1
10 TABLET, COATED ORAL DAILY
COMMUNITY
Start: 2022-11-16

## 2022-11-22 RX ORDER — UBROGEPANT 50 MG/1
TABLET ORAL
Qty: 9 TABLET | Refills: 11 | Status: SHIPPED | OUTPATIENT
Start: 2022-11-22

## 2022-11-22 RX ORDER — LINACLOTIDE 145 UG/1
145 CAPSULE, GELATIN COATED ORAL DAILY
COMMUNITY
Start: 2022-11-14

## 2022-11-22 NOTE — PROGRESS NOTES
"HPI:  Elizabeth Wise is a 27 y.o. female with an episode in early 2012 consistent Vasovagal syncope. Near convulsive syncope at that time with "grunting and eyes rolling back" also noted, likely due to maintained upright position. Work up revealed only Small PFO noted on bubble study but cardiology agreed this is likely vasovagal in nature  History of migraine with aura. S/p hysterectomy. She had an EP study in 2021 after a loop recorder showed a run of A-fib. They attempted ablation, but was unsuccessful, and she was placed on sotalol.     She presents today for a follow up visit. A few months ago, she was having more headaches. She was consuming loaded teas, as well as Dr. Pepper. She is no longer consuming caffeine, and her migraines are reduced to once per week. She does require abortive therapy. Ubrelvy remains effective. Migraines last for hours at a time.     She also stopped taking Pamelor two months ago, due to her working nights. She feels that current migraines are tolerable with abortive therapy, and denies the need for preventative therapy at this time.     She will be seeing Dr. Reyez in [place]/Cardiology for A-fib history.     She is also seeing Dr. Minor/GI for erosive gastritis/GERD.     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 She lives in [place].     Review of Systems   Constitutional:  Negative for fever.   HENT:  Negative for nosebleeds.    Eyes:  Negative for double vision.   Respiratory:  Negative for shortness of breath.    Cardiovascular:  Negative for chest pain and leg swelling.   Gastrointestinal:  Negative for blood in stool.   Genitourinary:  Negative for hematuria.   Musculoskeletal:  Negative for falls.   Skin:  Negative for rash.   Neurological:  Positive for headaches.   Psychiatric/Behavioral:  The patient does not have insomnia.      Exam:   Gen Appearance, well developed/nourished in no apparent distress  CV: 2+ distal pulses with no edema or swelling  Neuro:  MS: " "Awake, alert, Sustains attention. Recent/remote memory intact, Language is full to spontaneous speech/comprehension. Fund of Knowledge is full  CN: Optic discs are flat with normal vasculature, PERRL, Extraoccular movements and visual fields are full. Normal facial sensation and strength, Tongue and Palate are midline and strong. Shoulder Shrug symmetric and strong.  Motor: Normal bulk, tone, no abnormal movements. No protonator drift and 5/5 bilateral UE/LE strength  Sensory: . Romberg negative and temp and vibration intact  Cerebellar: Finger-nose, Heal and shin intact  Gait: Normal stance, no ataxia    Imaging:  3/28/12 MRI of the brain reports and images reviewed: NORMAL MRI OF THE BRAIN.  2/6/12 EEG report reviewed from [facility]: Normal    Assessment/Recommendation: Elizabeth Wise is a 27 y.o. female with an episode in early 2012 consistent Vasovagal syncope. Near consulvive syncope at that time with "grunting and eyes rolling back" also noted, likely due to maintained upright position. Work up revealed only Small PFO noted on bubble study but cardiology agreed this is likely vasovagal in nature.    I recommend:   1. She is no longer taking Pamelor for migraine prevention, due to night shift work. Migraine frequency improved since she stopped consuming caffeine. No need for preventative therapy at this time.     Elavil caused too much sedation prior.     -Topamax /Zonegran failed prior. Does not feel she needs a prevention medication at this time. Now S/p hysterectomy.     - Botox or CGRP antagonist could be considered next if needed.     2. Propranolol was being used due to palpitations per Cardiologist/ no syncope/ PFO likely incidentally but was not helpful for headaches  -she was taking Metoprolol, but is now on sotalol, for tachycardia/A-fib run after an EP study done last year.     3. For migraine PRN:  Continue Ubrelvy prn, which is effective.   -she failed Maxalt, Imitrex, Toradol, Exedrin, and " Ibuprofen.   She has responded Duexis prior.   -she has a history of GERD and gastritis, and was advised by GI not take ASA or other NSAIDS.   -compazine caused drowsiness.     RTC 1 year

## 2023-01-18 ENCOUNTER — TELEPHONE (OUTPATIENT)
Dept: NEUROLOGY | Facility: CLINIC | Age: 28
End: 2023-01-18
Payer: MEDICAID

## 2023-01-19 ENCOUNTER — PATIENT MESSAGE (OUTPATIENT)
Dept: NEUROLOGY | Facility: CLINIC | Age: 28
End: 2023-01-19
Payer: MEDICAID

## 2023-01-24 NOTE — TELEPHONE ENCOUNTER
"Called patient Insurance; [employer].   Initiated PA over phone with "[employer]" employer.    PA Reference number: [id number]  "

## 2023-01-30 ENCOUNTER — PATIENT MESSAGE (OUTPATIENT)
Dept: NEUROLOGY | Facility: CLINIC | Age: 28
End: 2023-01-30
Payer: MEDICAID

## 2023-03-06 ENCOUNTER — HOSPITAL ENCOUNTER (EMERGENCY)
Facility: HOSPITAL | Age: 28
Discharge: HOME | End: 2023-03-06
Attending: STUDENT IN AN ORGANIZED HEALTH CARE EDUCATION/TRAINING PROGRAM
Payer: MEDICAID

## 2023-03-06 VITALS
BODY MASS INDEX: 31.44 KG/M2 | WEIGHT: 200.75 LBS | OXYGEN SATURATION: 99 % | TEMPERATURE: 99 F | SYSTOLIC BLOOD PRESSURE: 123 MMHG | DIASTOLIC BLOOD PRESSURE: 84 MMHG | RESPIRATION RATE: 18 BRPM | HEART RATE: 72 BPM

## 2023-03-06 DIAGNOSIS — K52.9 GASTROENTERITIS: ICD-10-CM

## 2023-03-06 DIAGNOSIS — K21.00 GASTROESOPHAGEAL REFLUX DISEASE WITH ESOPHAGITIS WITHOUT HEMORRHAGE: ICD-10-CM

## 2023-03-06 DIAGNOSIS — R10.13 EPIGASTRIC PAIN: Primary | ICD-10-CM

## 2023-03-06 LAB
ALBUMIN SERPL BCP-MCNC: 4.4 G/DL (ref 3.5–5.2)
ALP SERPL-CCNC: 50 U/L (ref 55–135)
ALT SERPL W/O P-5'-P-CCNC: 32 U/L (ref 10–44)
ANION GAP SERPL CALC-SCNC: 10 MMOL/L (ref 8–16)
AST SERPL-CCNC: 18 U/L (ref 10–40)
B-HCG UR QL: NEGATIVE
BASOPHILS # BLD AUTO: 0.02 K/UL (ref 0–0.2)
BASOPHILS NFR BLD: 0.2 % (ref 0–1.9)
BILIRUB SERPL-MCNC: 0.5 MG/DL (ref 0.1–1)
BILIRUB UR QL STRIP: NEGATIVE
BUN SERPL-MCNC: 11 MG/DL (ref 6–20)
CALCIUM SERPL-MCNC: 10.6 MG/DL (ref 8.7–10.5)
CHLORIDE SERPL-SCNC: 102 MMOL/L (ref 95–110)
CLARITY UR: CLEAR
CO2 SERPL-SCNC: 26 MMOL/L (ref 23–29)
COLOR UR: YELLOW
CREAT SERPL-MCNC: 0.8 MG/DL (ref 0.5–1.4)
DIFFERENTIAL METHOD: ABNORMAL
EOSINOPHIL # BLD AUTO: 0.1 K/UL (ref 0–0.5)
EOSINOPHIL NFR BLD: 0.6 % (ref 0–8)
ERYTHROCYTE [DISTWIDTH] IN BLOOD BY AUTOMATED COUNT: 12.1 % (ref 11.5–14.5)
EST. GFR  (NO RACE VARIABLE): >60 ML/MIN/1.73 M^2
GLUCOSE SERPL-MCNC: 98 MG/DL (ref 70–110)
GLUCOSE UR QL STRIP: NEGATIVE
HCT VFR BLD AUTO: 38.9 % (ref 37–48.5)
HGB BLD-MCNC: 12.3 G/DL (ref 12–16)
HGB UR QL STRIP: NEGATIVE
IMM GRANULOCYTES # BLD AUTO: 0.02 K/UL (ref 0–0.04)
IMM GRANULOCYTES NFR BLD AUTO: 0.2 % (ref 0–0.5)
KETONES UR QL STRIP: NEGATIVE
LACTATE SERPL-SCNC: 0.6 MMOL/L (ref 0.5–2.2)
LEUKOCYTE ESTERASE UR QL STRIP: NEGATIVE
LIPASE SERPL-CCNC: 43 U/L (ref 4–60)
LYMPHOCYTES # BLD AUTO: 2.2 K/UL (ref 1–4.8)
LYMPHOCYTES NFR BLD: 26.6 % (ref 18–48)
MCH RBC QN AUTO: 27.6 PG (ref 27–31)
MCHC RBC AUTO-ENTMCNC: 31.6 G/DL (ref 32–36)
MCV RBC AUTO: 87 FL (ref 82–98)
MONOCYTES # BLD AUTO: 0.5 K/UL (ref 0.3–1)
MONOCYTES NFR BLD: 6.4 % (ref 4–15)
NEUTROPHILS # BLD AUTO: 5.4 K/UL (ref 1.8–7.7)
NEUTROPHILS NFR BLD: 66 % (ref 38–73)
NITRITE UR QL STRIP: NEGATIVE
NRBC BLD-RTO: 0 /100 WBC
PH UR STRIP: 7 [PH] (ref 5–8)
PLATELET # BLD AUTO: 235 K/UL (ref 150–450)
PMV BLD AUTO: 10 FL (ref 9.2–12.9)
POTASSIUM SERPL-SCNC: 3.6 MMOL/L (ref 3.5–5.1)
PROT SERPL-MCNC: 7.9 G/DL (ref 6–8.4)
PROT UR QL STRIP: NEGATIVE
RBC # BLD AUTO: 4.46 M/UL (ref 4–5.4)
SODIUM SERPL-SCNC: 138 MMOL/L (ref 136–145)
SP GR UR STRIP: 1.01 (ref 1–1.03)
URN SPEC COLLECT METH UR: NORMAL
UROBILINOGEN UR STRIP-ACNC: NEGATIVE EU/DL
WBC # BLD AUTO: 8.13 K/UL (ref 3.9–12.7)

## 2023-03-06 PROCEDURE — 83605 ASSAY OF LACTIC ACID: CPT | Performed by: STUDENT IN AN ORGANIZED HEALTH CARE EDUCATION/TRAINING PROGRAM

## 2023-03-06 PROCEDURE — 25500020 PHARM REV CODE 255: Performed by: STUDENT IN AN ORGANIZED HEALTH CARE EDUCATION/TRAINING PROGRAM

## 2023-03-06 PROCEDURE — 81003 URINALYSIS AUTO W/O SCOPE: CPT | Performed by: STUDENT IN AN ORGANIZED HEALTH CARE EDUCATION/TRAINING PROGRAM

## 2023-03-06 PROCEDURE — 81025 URINE PREGNANCY TEST: CPT | Performed by: STUDENT IN AN ORGANIZED HEALTH CARE EDUCATION/TRAINING PROGRAM

## 2023-03-06 PROCEDURE — 99285 EMERGENCY DEPT VISIT HI MDM: CPT | Mod: 25

## 2023-03-06 PROCEDURE — 80053 COMPREHEN METABOLIC PANEL: CPT | Performed by: STUDENT IN AN ORGANIZED HEALTH CARE EDUCATION/TRAINING PROGRAM

## 2023-03-06 PROCEDURE — 83690 ASSAY OF LIPASE: CPT | Performed by: STUDENT IN AN ORGANIZED HEALTH CARE EDUCATION/TRAINING PROGRAM

## 2023-03-06 PROCEDURE — 36415 COLL VENOUS BLD VENIPUNCTURE: CPT | Performed by: STUDENT IN AN ORGANIZED HEALTH CARE EDUCATION/TRAINING PROGRAM

## 2023-03-06 PROCEDURE — 85025 COMPLETE CBC W/AUTO DIFF WBC: CPT | Performed by: STUDENT IN AN ORGANIZED HEALTH CARE EDUCATION/TRAINING PROGRAM

## 2023-03-06 PROCEDURE — 25000003 PHARM REV CODE 250: Performed by: STUDENT IN AN ORGANIZED HEALTH CARE EDUCATION/TRAINING PROGRAM

## 2023-03-06 RX ORDER — MAG HYDROX/ALUMINUM HYD/SIMETH 200-200-20
30 SUSPENSION, ORAL (FINAL DOSE FORM) ORAL ONCE
Status: COMPLETED | OUTPATIENT
Start: 2023-03-06 | End: 2023-03-06

## 2023-03-06 RX ORDER — LIDOCAINE HYDROCHLORIDE 20 MG/ML
15 SOLUTION OROPHARYNGEAL ONCE
Status: COMPLETED | OUTPATIENT
Start: 2023-03-06 | End: 2023-03-06

## 2023-03-06 RX ORDER — ACETAMINOPHEN 325 MG/1
650 TABLET ORAL
Status: COMPLETED | OUTPATIENT
Start: 2023-03-06 | End: 2023-03-06

## 2023-03-06 RX ADMIN — ACETAMINOPHEN 650 MG: 325 TABLET ORAL at 09:03

## 2023-03-06 RX ADMIN — IOHEXOL 100 ML: 350 INJECTION, SOLUTION INTRAVENOUS at 09:03

## 2023-03-06 RX ADMIN — ALUMINUM HYDROXIDE, MAGNESIUM HYDROXIDE, AND DIMETHICONE 30 ML: 200; 20; 200 SUSPENSION ORAL at 08:03

## 2023-03-06 RX ADMIN — LIDOCAINE HYDROCHLORIDE 15 ML: 20 SOLUTION ORAL at 08:03

## 2023-03-06 NOTE — DISCHARGE INSTRUCTIONS
Please follow up with your primary care physician within 2 days. Ensure that you review all lab work results and/or imaging results. If you have any questions about your discharge paperwork please call the Emergency Department.     Return to the ED for any fevers, nausea, vomiting, abdominal pain, diarrhea, inability to take food or water by mouth without vomiting, or any new or worsening symptoms.       Thank you for visiting [facility], Department of Emergency Medicine. Please see the entirety of the educational materials provided. Please note that a visit to the emergency department does not substitute ongoing care from a primary medical provider or specialist. Please ensure to follow up as recommended. However, please return to the emergency department immediately if symptoms do not improve as discussed, symptoms worsen, new symptoms develop, difficulty in following up or for any of your concerns or issues. Please note on discharge you are acknowledging understanding and agreement on medical evaluation, management recommendations and follow up recommendations.

## 2023-03-06 NOTE — ED PROVIDER NOTES
Encounter Date: 3/6/2023       History     Chief Complaint   Patient presents with    Abdominal Pain     Patient to ER CC of abd pain that started last night around midnight     27-year-old female with history of SVT, reflux, duodenitis, presenting with epigastric pain that started last night.  Denies nausea or vomiting.  No diarrhea.  No lower abdominal pain, vaginal discharge, vaginal bleeding.  Denies any fever or shortness of breath.  Patient does endorse mild cough.  No chest pain.  Patient has taken Maalox and Nexium without any improvement.    Review of patient's allergies indicates:   Allergen Reactions    Doxycycline Other (See Comments)     Yeast infection     Past Medical History:   Diagnosis Date    Duodenitis     Elevated cholesterol     Endometriosis     Erosive gastritis     GERD (gastroesophageal reflux disease)     Heart disorder     hole in heart    Migraine headache     Migraines     Murmur     Tachycardia      Past Surgical History:   Procedure Laterality Date    CYSTOSCOPY N/A 12/10/2019    Procedure: CYSTOSCOPY;  Surgeon: Julia Fagan MD;  Location: [facility];  Service: OB/GYN;  Laterality: N/A;    DIAGNOSTIC LAPAROSCOPY N/A 6/1/2018    Procedure: LAPAROSCOPY-DIAGNOSTIC;  Surgeon: Olivia Aguillon MD;  Location: [facility];  Service: OB/GYN;  Laterality: N/A;    DILATION AND CURETTAGE OF UTERUS N/A 6/1/2018    Procedure: DILATION-CURETTAGE OF UTERUS (D AND C);  Surgeon: Olivia Aguillon MD;  Location: [facility];  Service: OB/GYN;  Laterality: N/A;    FULGURATION OF ENDOMETRIOSIS N/A 6/1/2018    Procedure: FULGURATION, ENDOMETRIOSIS;  Surgeon: Olivia Aguillon MD;  Location: [facility] OR;  Service: OB/GYN;  Laterality: N/A;    HYSTERECTOMY      INTRAUTERINE DEVICE INSERTION N/A 6/1/2018    Procedure: PLACEMENT INTRAUTERINE DEVICE (IUD);  Surgeon: Olivia Aguillon MD;  Location: [facility] OR;  Service: OB/GYN;  Laterality: N/A;    LAPAROSCOPIC APPENDECTOMY Right 6/21/2019    Procedure: APPENDECTOMY,  LAPAROSCOPIC;  Surgeon: Lane Her Jr., MD;  Location: [facility];  Service: General;  Laterality: Right;    LAPAROSCOPIC SALPINGECTOMY Bilateral 12/10/2019    Procedure: SALPINGECTOMY, LAPAROSCOPIC;  Surgeon: Julia Fagan MD;  Location: [facility] OR;  Service: OB/GYN;  Laterality: Bilateral;    LAPAROSCOPIC SURGICAL REMOVAL OF CYST OF OVARY Left 6/1/2018    Procedure: EXCISION, CYST, OVARY, LAPAROSCOPIC;  Surgeon: Olivia Aguillon MD;  Location: [facility] OR;  Service: OB/GYN;  Laterality: Left;    LAPAROSCOPIC TOTAL HYSTERECTOMY N/A 12/10/2019    Procedure: HYSTERECTOMY, TOTAL, LAPAROSCOPIC;  Surgeon: Julia Fagan MD;  Location: [facility] OR;  Service: OB/GYN;  Laterality: N/A;    LYSIS OF ADHESIONS N/A 6/1/2018    Procedure: LYSIS, ADHESIONS;  Surgeon: Olivia Aguillon MD;  Location: [facility] OR;  Service: OB/GYN;  Laterality: N/A;    REMOVAL OF INTRAUTERINE DEVICE (IUD) N/A 6/1/2018    Procedure: REMOVAL, INTRAUTERINE DEVICE;  Surgeon: Olivia Aguillon MD;  Location: [facility];  Service: OB/GYN;  Laterality: N/A;    REPAIR OF INCARCERATED UMBILICAL HERNIA N/A 8/6/2019    Procedure: REPAIR, HERNIA, UMBILICAL, INCARCERATED;  Surgeon: Lane Her Jr., MD;  Location: [facility];  Service: General;  Laterality: N/A;    THERMAL ABLATION OF ENDOMETRIUM USING HYSTEROSCOPY N/A 6/1/2018    Procedure: ABLATION ENDOMETRIAL THERMAL - NOVASURE;  Surgeon: Olivia Aguillon MD;  Location: [facility];  Service: OB/GYN;  Laterality: N/A;    WISDOM TOOTH EXTRACTION  10/2012     Family History   Problem Relation Age of Onset    Diabetes Mother     Hyperlipidemia Mother     Hypertension Father     Hyperlipidemia Father     Cerebral palsy Brother         half brother; dad's side    Heart disease Maternal Grandmother     Diabetes Maternal Grandmother     Heart disease Maternal Grandfather     Kidney disease Maternal Grandfather     Heart disease Paternal Grandmother     Heart disease Paternal Grandfather     Breast cancer Neg Hx     Colon  cancer Neg Hx     Ovarian cancer Neg Hx     Cancer Neg Hx     Eclampsia Neg Hx     Miscarriages / Stillbirths Neg Hx      labor Neg Hx     Stroke Neg Hx      Social History     Tobacco Use    Smoking status: Never    Smokeless tobacco: Never   Substance Use Topics    Alcohol use: Yes     Comment: occ    Drug use: No     Review of Systems   Constitutional:  Negative for fever.   HENT:  Negative for sore throat.    Respiratory:  Negative for shortness of breath.    Cardiovascular:  Negative for chest pain.   Gastrointestinal:  Positive for abdominal pain. Negative for nausea.   Genitourinary:  Negative for dysuria.   Musculoskeletal:  Negative for back pain.   Skin:  Negative for rash.   Neurological:  Negative for weakness.   Hematological:  Does not bruise/bleed easily.     Physical Exam     Initial Vitals [23 0800]   BP Pulse Resp Temp SpO2   123/84 72 18 98.7 °F (37.1 °C) 99 %      MAP       --         Physical Exam    Nursing note and vitals reviewed.  Constitutional: She appears well-developed.   HENT:   Head: Normocephalic.   Eyes: Pupils are equal, round, and reactive to light.   Neck:   Normal range of motion.  Cardiovascular:            No murmur heard.  Pulmonary/Chest: No respiratory distress.   Abdominal: Abdomen is soft.   Mild epigastric tenderness to palpation.  No other tenderness to palpation. No guarding, rebound, or masses. Negative Fisher's sign. No TTP at McBurney's point.     Musculoskeletal:         General: No edema.      Cervical back: Normal range of motion.     Neurological: She is alert.   Skin: Skin is warm.   Psychiatric: She has a normal mood and affect.       ED Course   Procedures  Labs Reviewed   CBC W/ AUTO DIFFERENTIAL   COMPREHENSIVE METABOLIC PANEL   LIPASE   LACTIC ACID, PLASMA   URINALYSIS, REFLEX TO URINE CULTURE          Imaging Results    None          Medications   aluminum-magnesium hydroxide-simethicone 200-200-20 mg/5 mL suspension 30 mL (has no  administration in time range)     And   LIDOcaine HCl 2% oral solution 15 mL (has no administration in time range)     Medical Decision Making:   Differential Diagnosis:   DDX: Unlikely acute abdominal pathology such as appendicitis/cholecystitis given benign abdomen, negative Fisher's sign at this time. R/o pancreatitis, UTI. Possible GERD/gastritis vs. AGE given history, benign abdomen.  DX: BMP, CBC, LFT, lipase, UA/Udip. Upreg. Serial abdominal exams. Consider CT A/P if change in abdominal exam to assess for early appendicitis. Consider ultrasound if change in abdominal exam to assess for cholecystitis.  TX: Analgesia PRN. Antiemetic PRN. IVF. Treatment/consult as indicated by studies.  Dispo: Pending studies. If studies WNL, symptoms controlled, tolerating PO, discharge to follow up with primary doctor within 2 days with recommendations for supportive care at home and strict precautions for return.                            Clinical Impression:   Final diagnoses:  [R10.13] Epigastric pain (Primary)               Manuel Cheung MD  03/06/23 [id number]

## 2023-03-09 ENCOUNTER — PATIENT MESSAGE (OUTPATIENT)
Dept: NEUROLOGY | Facility: CLINIC | Age: 28
End: 2023-03-09
Payer: MEDICAID

## 2023-03-15 ENCOUNTER — TELEPHONE (OUTPATIENT)
Dept: NEUROLOGY | Facility: CLINIC | Age: 28
End: 2023-03-15
Payer: MEDICAID

## 2023-03-17 ENCOUNTER — PATIENT MESSAGE (OUTPATIENT)
Dept: NEUROLOGY | Facility: CLINIC | Age: 28
End: 2023-03-17
Payer: MEDICAID

## 2023-03-21 ENCOUNTER — OFFICE VISIT (OUTPATIENT)
Dept: SURGERY | Facility: CLINIC | Age: 28
End: 2023-03-21
Payer: MEDICAID

## 2023-03-21 VITALS
SYSTOLIC BLOOD PRESSURE: 110 MMHG | DIASTOLIC BLOOD PRESSURE: 84 MMHG | HEIGHT: 67 IN | HEART RATE: 61 BPM | BODY MASS INDEX: 31.24 KG/M2 | OXYGEN SATURATION: 99 % | WEIGHT: 199.06 LBS

## 2023-03-21 DIAGNOSIS — K80.20 GALLSTONES: Primary | ICD-10-CM

## 2023-03-21 PROCEDURE — 1159F PR MEDICATION LIST DOCUMENTED IN MEDICAL RECORD: ICD-10-PCS | Mod: CPTII,,, | Performed by: SURGERY

## 2023-03-21 PROCEDURE — 3074F SYST BP LT 130 MM HG: CPT | Mod: CPTII,,, | Performed by: SURGERY

## 2023-03-21 PROCEDURE — 3008F PR BODY MASS INDEX (BMI) DOCUMENTED: ICD-10-PCS | Mod: CPTII,,, | Performed by: SURGERY

## 2023-03-21 PROCEDURE — 3079F PR MOST RECENT DIASTOLIC BLOOD PRESSURE 80-89 MM HG: ICD-10-PCS | Mod: CPTII,,, | Performed by: SURGERY

## 2023-03-21 PROCEDURE — 99214 OFFICE O/P EST MOD 30 MIN: CPT | Mod: PBBFAC | Performed by: SURGERY

## 2023-03-21 PROCEDURE — 1160F RVW MEDS BY RX/DR IN RCRD: CPT | Mod: CPTII,,, | Performed by: SURGERY

## 2023-03-21 PROCEDURE — 99999 PR PBB SHADOW E&M-EST. PATIENT-LVL IV: ICD-10-PCS | Mod: PBBFAC,,, | Performed by: SURGERY

## 2023-03-21 PROCEDURE — 1160F PR REVIEW ALL MEDS BY PRESCRIBER/CLIN PHARMACIST DOCUMENTED: ICD-10-PCS | Mod: CPTII,,, | Performed by: SURGERY

## 2023-03-21 PROCEDURE — 3008F BODY MASS INDEX DOCD: CPT | Mod: CPTII,,, | Performed by: SURGERY

## 2023-03-21 PROCEDURE — 1159F MED LIST DOCD IN RCRD: CPT | Mod: CPTII,,, | Performed by: SURGERY

## 2023-03-21 PROCEDURE — 3074F PR MOST RECENT SYSTOLIC BLOOD PRESSURE < 130 MM HG: ICD-10-PCS | Mod: CPTII,,, | Performed by: SURGERY

## 2023-03-21 PROCEDURE — 99999 PR PBB SHADOW E&M-EST. PATIENT-LVL IV: CPT | Mod: PBBFAC,,, | Performed by: SURGERY

## 2023-03-21 PROCEDURE — 99204 OFFICE O/P NEW MOD 45 MIN: CPT | Mod: S$PBB,,, | Performed by: SURGERY

## 2023-03-21 PROCEDURE — 99204 PR OFFICE/OUTPT VISIT, NEW, LEVL IV, 45-59 MIN: ICD-10-PCS | Mod: S$PBB,,, | Performed by: SURGERY

## 2023-03-21 PROCEDURE — 3079F DIAST BP 80-89 MM HG: CPT | Mod: CPTII,,, | Performed by: SURGERY

## 2023-03-21 RX ORDER — PANTOPRAZOLE SODIUM 40 MG/1
40 TABLET, DELAYED RELEASE ORAL
COMMUNITY
Start: 2023-03-07

## 2023-03-21 RX ORDER — LIDOCAINE HYDROCHLORIDE 10 MG/ML
1 INJECTION, SOLUTION EPIDURAL; INFILTRATION; INTRACAUDAL; PERINEURAL ONCE
Status: SHIPPED | OUTPATIENT
Start: 2023-03-21

## 2023-03-21 RX ORDER — SODIUM CHLORIDE, SODIUM LACTATE, POTASSIUM CHLORIDE, CALCIUM CHLORIDE 600; 310; 30; 20 MG/100ML; MG/100ML; MG/100ML; MG/100ML
INJECTION, SOLUTION INTRAVENOUS CONTINUOUS
Status: CANCELLED | OUTPATIENT
Start: 2023-03-21

## 2023-03-21 NOTE — PROGRESS NOTES
Subjective:       Patient ID: Elizabeth Coreas is a 27 y.o. female.    Chief Complaint: Gall Bladder Problem    Review of patient's allergies indicates:   Allergen Reactions    Doxycycline Other (See Comments)     Yeast infection     27-year-old female referred to me for gallstones.  Patient went to the emergency room here 2 weeks ago and had a CT scan that was unrevealing.  She then went see her primary doctor who ordered a gallbladder ultrasound which showed a mobile gallstone.  She states she is been having symptoms since October.  She complains of severe epigastric pain.  She is wanting her gallbladder removed.  I actually did a laparoscopic appendectomy on her in 2019 and then 2 months later I did an umbilical hernia repair without mesh on her.  She was present with her mom.  Past Medical History:   Diagnosis Date    Duodenitis     Elevated cholesterol     Endometriosis     Erosive gastritis     GERD (gastroesophageal reflux disease)     Heart disorder     hole in heart    Migraine headache     Migraines     Murmur     Tachycardia      Past Surgical History:   Procedure Laterality Date    CYSTOSCOPY N/A 12/10/2019    Procedure: CYSTOSCOPY;  Surgeon: Julia Fagan MD;  Location: [facility];  Service: OB/GYN;  Laterality: N/A;    DIAGNOSTIC LAPAROSCOPY N/A 6/1/2018    Procedure: LAPAROSCOPY-DIAGNOSTIC;  Surgeon: Olivia Aguillon MD;  Location: [facility] OR;  Service: OB/GYN;  Laterality: N/A;    DILATION AND CURETTAGE OF UTERUS N/A 6/1/2018    Procedure: DILATION-CURETTAGE OF UTERUS (D AND C);  Surgeon: Olivia Aguillon MD;  Location: [facility] OR;  Service: OB/GYN;  Laterality: N/A;    FULGURATION OF ENDOMETRIOSIS N/A 6/1/2018    Procedure: FULGURATION, ENDOMETRIOSIS;  Surgeon: Olivia Aguillon MD;  Location: [facility] OR;  Service: OB/GYN;  Laterality: N/A;    HYSTERECTOMY      INTRAUTERINE DEVICE INSERTION N/A 6/1/2018    Procedure: PLACEMENT INTRAUTERINE DEVICE (IUD);  Surgeon: Olivia Aguillon MD;  Location: [facility] OR;  Service:  OB/GYN;  Laterality: N/A;    LAPAROSCOPIC APPENDECTOMY Right 6/21/2019    Procedure: APPENDECTOMY, LAPAROSCOPIC;  Surgeon: Lane Her Jr., MD;  Location: [facility];  Service: General;  Laterality: Right;    LAPAROSCOPIC SALPINGECTOMY Bilateral 12/10/2019    Procedure: SALPINGECTOMY, LAPAROSCOPIC;  Surgeon: Julia Fagan MD;  Location: [facility];  Service: OB/GYN;  Laterality: Bilateral;    LAPAROSCOPIC SURGICAL REMOVAL OF CYST OF OVARY Left 6/1/2018    Procedure: EXCISION, CYST, OVARY, LAPAROSCOPIC;  Surgeon: Olivia Aguillon MD;  Location: [facility] OR;  Service: OB/GYN;  Laterality: Left;    LAPAROSCOPIC TOTAL HYSTERECTOMY N/A 12/10/2019    Procedure: HYSTERECTOMY, TOTAL, LAPAROSCOPIC;  Surgeon: Julia Fagan MD;  Location: [facility];  Service: OB/GYN;  Laterality: N/A;    LYSIS OF ADHESIONS N/A 6/1/2018    Procedure: LYSIS, ADHESIONS;  Surgeon: Olivia Aguillon MD;  Location: [facility];  Service: OB/GYN;  Laterality: N/A;    REMOVAL OF INTRAUTERINE DEVICE (IUD) N/A 6/1/2018    Procedure: REMOVAL, INTRAUTERINE DEVICE;  Surgeon: Olivia Aguillon MD;  Location: [facility];  Service: OB/GYN;  Laterality: N/A;    REPAIR OF INCARCERATED UMBILICAL HERNIA N/A 8/6/2019    Procedure: REPAIR, HERNIA, UMBILICAL, INCARCERATED;  Surgeon: Lane Her Jr., MD;  Location: [facility];  Service: General;  Laterality: N/A;    THERMAL ABLATION OF ENDOMETRIUM USING HYSTEROSCOPY N/A 6/1/2018    Procedure: ABLATION ENDOMETRIAL THERMAL - NOVASURE;  Surgeon: Olivia Aguillon MD;  Location: [facility];  Service: OB/GYN;  Laterality: N/A;    WISDOM TOOTH EXTRACTION  10/2012     Family History   Problem Relation Age of Onset    Diabetes Mother     Hyperlipidemia Mother     Hypertension Father     Hyperlipidemia Father     Cerebral palsy Brother         half brother; dad's side    Heart disease Maternal Grandmother     Diabetes Maternal Grandmother     Heart disease Maternal Grandfather     Kidney disease Maternal Grandfather     Heart disease  Paternal Grandmother     Heart disease Paternal Grandfather     Breast cancer Neg Hx     Colon cancer Neg Hx     Ovarian cancer Neg Hx     Cancer Neg Hx     Eclampsia Neg Hx     Miscarriages / Stillbirths Neg Hx      labor Neg Hx     Stroke Neg Hx      Social History     Socioeconomic History    Marital status:    Tobacco Use    Smoking status: Never    Smokeless tobacco: Never   Substance and Sexual Activity    Alcohol use: Yes     Comment: occ    Drug use: No    Sexual activity: Yes     Partners: Male     Birth control/protection: Implant     Comment:  Mirena- 2018   Social History Narrative    ** Merged History Encounter **          Vitals:    23 0943   BP: 110/84   Pulse: 61       Review of Systems   Gastrointestinal:  Positive for abdominal pain.   All other systems reviewed and are negative.    Objective:      Physical Exam  Constitutional:       Appearance: She is well-developed.   HENT:      Head: Normocephalic.   Eyes:      Pupils: Pupils are equal, round, and reactive to light.   Pulmonary:      Effort: Pulmonary effort is normal.   Abdominal:      Palpations: Abdomen is soft.   Musculoskeletal:         General: Normal range of motion.      Cervical back: Normal range of motion.   Skin:     General: Skin is warm and dry.   Neurological:      Mental Status: She is alert and oriented to person, place, and time.       Assessment:       1. Gallstones          Plan:         Elizabeth was seen today for gall bladder problem.    Diagnoses and all orders for this visit:    Gallstones    Patient will be scheduled for elective laparoscopic cholecystectomy.    No follow-ups on file.          Lane Her Jr, MD

## 2023-03-21 NOTE — H&P (VIEW-ONLY)
Subjective:       Patient ID: Elizabeth Coreas is a 27 y.o. female.    Chief Complaint: Gall Bladder Problem    Review of patient's allergies indicates:   Allergen Reactions    Doxycycline Other (See Comments)     Yeast infection     27-year-old female referred to me for gallstones.  Patient went to the emergency room here 2 weeks ago and had a CT scan that was unrevealing.  She then went see her primary doctor who ordered a gallbladder ultrasound which showed a mobile gallstone.  She states she is been having symptoms since October.  She complains of severe epigastric pain.  She is wanting her gallbladder removed.  I actually did a laparoscopic appendectomy on her in 2019 and then 2 months later I did an umbilical hernia repair without mesh on her.  She was present with her mom.  Past Medical History:   Diagnosis Date    Duodenitis     Elevated cholesterol     Endometriosis     Erosive gastritis     GERD (gastroesophageal reflux disease)     Heart disorder     hole in heart    Migraine headache     Migraines     Murmur     Tachycardia      Past Surgical History:   Procedure Laterality Date    CYSTOSCOPY N/A 12/10/2019    Procedure: CYSTOSCOPY;  Surgeon: Julia Fagan MD;  Location: [facility];  Service: OB/GYN;  Laterality: N/A;    DIAGNOSTIC LAPAROSCOPY N/A 6/1/2018    Procedure: LAPAROSCOPY-DIAGNOSTIC;  Surgeon: Olivia Aguillon MD;  Location: [facility] OR;  Service: OB/GYN;  Laterality: N/A;    DILATION AND CURETTAGE OF UTERUS N/A 6/1/2018    Procedure: DILATION-CURETTAGE OF UTERUS (D AND C);  Surgeon: Olivia Aguillon MD;  Location: [facility] OR;  Service: OB/GYN;  Laterality: N/A;    FULGURATION OF ENDOMETRIOSIS N/A 6/1/2018    Procedure: FULGURATION, ENDOMETRIOSIS;  Surgeon: Olivia Aguillon MD;  Location: [facility] OR;  Service: OB/GYN;  Laterality: N/A;    HYSTERECTOMY      INTRAUTERINE DEVICE INSERTION N/A 6/1/2018    Procedure: PLACEMENT INTRAUTERINE DEVICE (IUD);  Surgeon: Olivia Aguillon MD;  Location: [facility] OR;  Service:  OB/GYN;  Laterality: N/A;    LAPAROSCOPIC APPENDECTOMY Right 6/21/2019    Procedure: APPENDECTOMY, LAPAROSCOPIC;  Surgeon: Lane Her Jr., MD;  Location: [facility];  Service: General;  Laterality: Right;    LAPAROSCOPIC SALPINGECTOMY Bilateral 12/10/2019    Procedure: SALPINGECTOMY, LAPAROSCOPIC;  Surgeon: Julia Fagan MD;  Location: [facility];  Service: OB/GYN;  Laterality: Bilateral;    LAPAROSCOPIC SURGICAL REMOVAL OF CYST OF OVARY Left 6/1/2018    Procedure: EXCISION, CYST, OVARY, LAPAROSCOPIC;  Surgeon: Olivia Aguillon MD;  Location: [facility] OR;  Service: OB/GYN;  Laterality: Left;    LAPAROSCOPIC TOTAL HYSTERECTOMY N/A 12/10/2019    Procedure: HYSTERECTOMY, TOTAL, LAPAROSCOPIC;  Surgeon: Julia Fagan MD;  Location: [facility];  Service: OB/GYN;  Laterality: N/A;    LYSIS OF ADHESIONS N/A 6/1/2018    Procedure: LYSIS, ADHESIONS;  Surgeon: Olivia Aguillon MD;  Location: [facility];  Service: OB/GYN;  Laterality: N/A;    REMOVAL OF INTRAUTERINE DEVICE (IUD) N/A 6/1/2018    Procedure: REMOVAL, INTRAUTERINE DEVICE;  Surgeon: Olivia Aguillon MD;  Location: [facility];  Service: OB/GYN;  Laterality: N/A;    REPAIR OF INCARCERATED UMBILICAL HERNIA N/A 8/6/2019    Procedure: REPAIR, HERNIA, UMBILICAL, INCARCERATED;  Surgeon: Lane Her Jr., MD;  Location: [facility];  Service: General;  Laterality: N/A;    THERMAL ABLATION OF ENDOMETRIUM USING HYSTEROSCOPY N/A 6/1/2018    Procedure: ABLATION ENDOMETRIAL THERMAL - NOVASURE;  Surgeon: Olivia Aguillon MD;  Location: [facility];  Service: OB/GYN;  Laterality: N/A;    WISDOM TOOTH EXTRACTION  10/2012     Family History   Problem Relation Age of Onset    Diabetes Mother     Hyperlipidemia Mother     Hypertension Father     Hyperlipidemia Father     Cerebral palsy Brother         half brother; dad's side    Heart disease Maternal Grandmother     Diabetes Maternal Grandmother     Heart disease Maternal Grandfather     Kidney disease Maternal Grandfather     Heart disease  Paternal Grandmother     Heart disease Paternal Grandfather     Breast cancer Neg Hx     Colon cancer Neg Hx     Ovarian cancer Neg Hx     Cancer Neg Hx     Eclampsia Neg Hx     Miscarriages / Stillbirths Neg Hx      labor Neg Hx     Stroke Neg Hx      Social History     Socioeconomic History    Marital status:    Tobacco Use    Smoking status: Never    Smokeless tobacco: Never   Substance and Sexual Activity    Alcohol use: Yes     Comment: occ    Drug use: No    Sexual activity: Yes     Partners: Male     Birth control/protection: Implant     Comment:  Mirena- 2018   Social History Narrative    ** Merged History Encounter **          Vitals:    23 0943   BP: 110/84   Pulse: 61       Review of Systems   Gastrointestinal:  Positive for abdominal pain.   All other systems reviewed and are negative.    Objective:      Physical Exam  Constitutional:       Appearance: She is well-developed.   HENT:      Head: Normocephalic.   Eyes:      Pupils: Pupils are equal, round, and reactive to light.   Pulmonary:      Effort: Pulmonary effort is normal.   Abdominal:      Palpations: Abdomen is soft.   Musculoskeletal:         General: Normal range of motion.      Cervical back: Normal range of motion.   Skin:     General: Skin is warm and dry.   Neurological:      Mental Status: She is alert and oriented to person, place, and time.       Assessment:       1. Gallstones          Plan:         Elizabeth was seen today for gall bladder problem.    Diagnoses and all orders for this visit:    Gallstones    Patient will be scheduled for elective laparoscopic cholecystectomy.    No follow-ups on file.          Lane Her Jr, MD

## 2023-03-28 ENCOUNTER — HOSPITAL ENCOUNTER (OUTPATIENT)
Dept: PREADMISSION TESTING | Facility: HOSPITAL | Age: 28
Discharge: HOME | End: 2023-03-28
Attending: SURGERY
Payer: MEDICAID

## 2023-03-28 ENCOUNTER — HOSPITAL ENCOUNTER (OUTPATIENT)
Dept: PULMONOLOGY | Facility: HOSPITAL | Age: 28
Discharge: HOME | End: 2023-03-28
Attending: SURGERY
Payer: MEDICAID

## 2023-03-28 DIAGNOSIS — K80.20 GALLSTONES: ICD-10-CM

## 2023-03-28 PROCEDURE — 93010 ELECTROCARDIOGRAM REPORT: CPT | Mod: ,,, | Performed by: INTERNAL MEDICINE

## 2023-03-28 PROCEDURE — 93010 EKG 12-LEAD: ICD-10-PCS | Mod: ,,, | Performed by: INTERNAL MEDICINE

## 2023-03-28 PROCEDURE — 93005 ELECTROCARDIOGRAM TRACING: CPT

## 2023-03-28 NOTE — DISCHARGE INSTRUCTIONS
[facility]  Pre Admit Instructions    Day and Date of Procedure:      Call your doctor if you become ill, have an infection, or are taking antibiotics before your surgery  Someone will call you between 1 p.m. And 5 p.m. the workday before the procedure to give you an arrival time       - If your procedure is before 7 a.m. enter through Emergency Room door and check in with them       - 7 a.m. to 5 p.m. Enter through main entrance and check in with registration  You must have a responsible  to bring you home  Only one person will be allowed per patient due to Covid-19 restrictions  You must wear a mask at all times. If you do not have a mask, we will provide you with one. No Exception.   Cafeteria hours for guest: 7am to 10am; 11am to 1:30 pm.    Do NOT eat anything past:   Clear liquids until:  No dairy, creamers, gum or mints the morning of your procedure    Please    Do not wear makeup, jewelry, nail polish or body piercings  Bring containers/solution for contacts, dentures, bridges - these and hearing aids will be removed before your procedure  Do not bring cash, jewelry or valuables the day of your procedure   No smoking at least 24 hours before your procedure  Wear clothing that is comfortable and easy to take off and put on  Do NOT shave for at least 5 days before your surgery    PRE-OP Hibiclens bath Instructions:    Shower with Hibiclens the Night before and morning of the procedure.   Wash your face with your regular cleanser. DO NOT use Hibiclens on your face.  Thoroughly rinse your body with warm water from the neck down  Apply Hibiclens directly to your skin or on a wet washcloth and wash gently. Do not stand under the water while washing with Hibiclens as you will   remove the wash too soon.  Rinse thoroughly with warm water  DO NOT use your regular soap after you have bathed with the Hibiclens  Do not apply lotion or deodorant   Put on a clean pair of clothes after each  bath          Information about your stay (Please Review)    Before Surgery  Your doctor may order and review labs, x-rays, ECG or other tests as a pre-surgery workup and will call you if there is need for follow up.  If you did not get a Covid test before your procedure, one will be done when you arrive. You must have a negative Covid test result before your procedure.  No smoking inside or outside the hospital. You must leave the campus to smoke.  Wear clothing that is easy to take off and put on.  The hospital will provide you with a gown.  You may bring robe, slippers, nightwear, and toiletries (toothbrush, toothpaste, makeup) if needed.  If your doctor orders a Fleets Enema or other prep, follow package and/or doctors orders.  Brush your teeth and rinse your mouth the morning of surgery, but dont swallow the water.  The nurse will ask questions and check your condition.  The doctor may kameron your surgical site.  Compression boots will be placed on your calves to reduce the risk of blood clots.  An IV will be started in pre-procedure area.  The doctor may order medicine to help you relax before surgery.  After Surgery  After you recover in post-procedure area you will go to the medicine floor to recover for a few more hours.  The nurse will check your temperature, breathing, blood pressure, heart rate, IV site, and surgery site.  A diet will be ordered-most start with ice chips and then advance slowly to other foods.  If you have IV fluids the IV pump will beep to let the nurse know that she needs to check it.  You may have a urinary catheter and staff may measure your oral intake and urine output.  Pain medication may be ordered by the doctor after surgery.  If you have a pain management device tell your caretakers not to press the button because of OVERDOSE RISK.  When the nurse or doctor tells you it is okay to get out of bed, ask for help until you are stable.  The nurse may ask you to turn, cough, and deep  breathe to prevent lung problems.  You can use a pillow to hold your incision when you deep breathe or cough to reduce pain.  The nurse will give you discharge instructions--incision care, symptoms to report to your doctor, and your follow-up appointment when you are discharged. You cannot drive yourself home.  Only one person is allowed during your stay due to Covid-19 restrictions. Visiting hours are 8 am to 6 pm.    Goals for Discharge from One Day Surgery  Control pain with an oral medication  Walk without feeling dizzy or weak  Tolerate liquids well  Urinate without difficulty    Things you can do to  Reduce the Risk of Infections or Complications  Wash Hands and use Waterless Hand Sanitizers  Wash hands frequently with soap and warm water for at least 15 seconds.   Use hand sanitizers (alcohol based) often at home and in public if hands are not visibly soiled  Take Antibiotic Exactly as Prescribed  Do not stop antibiotics too soon; you risk developing infection resistant to antibiotics  Take your antibiotic even if you are feeling better and even if they upset your stomach  Call the doctor if you cant tolerate the antibiotic or you have an allergic reaction  Stay Healthy  Take medicines as prescribed by your doctor  Keep your diabetes under control - diet and medication  Get enough rest, exercise and eat a healthy diet  Keep the Wound Clean and Dry  Wash hands before and after taking care of the incision (cut)  Wash hands when you remove a dressing, before you touch/apply a new dressing  Shower and clean incision with antibacterial soap and rinse well if the doctor approves  Allow the cut to dry completely before putting on a clean dressing  Do not touch the part of the bandage that will cover the incision  Do not use ointments unless your doctor tells you to-can promote bacterial growth  If ordered, put ointment directly on the dressing-do not touch the end of the tube  Do not scrub, remove scabs, or leave a  damp dressing on the incision  Do not use peroxide or alcohol to clean the incision unless the doctor tells you to   Do not let children, pets or anyone else contaminate the incision  Stop Smoking To Prevent Infection  Stop smoking-Centers for Disease Control recommends 30 days before surgery  Smokers get more infections after surgery-studies have shown 6 times the risk  Smokers have more scarring and heal slower-open wounds get infected easier  Prevent Respiratory complications  Stop smoking  Turn, cough, and deep breathe even if you have some pain when you do so.  Splint your incision with a pillow when you cough/deep breath, to help control pain.  Do not lie in one position for long periods of time.   Prevent Blood Clots  When you wake move your legs, flex your feet, rotate your ankles, wiggle your toes  Get up when the doctor says its ok.  Dangle your feet from the side of the bed  Report symptoms-leg pain, redness/swelling, warm to touch; fever; shortness of breath, chest pain, severe upper back pain.

## 2023-04-04 ENCOUNTER — ANESTHESIA (OUTPATIENT)
Dept: SURGERY | Facility: HOSPITAL | Age: 28
End: 2023-04-04
Payer: MEDICAID

## 2023-04-04 ENCOUNTER — HOSPITAL ENCOUNTER (OUTPATIENT)
Facility: HOSPITAL | Age: 28
Discharge: HOME | End: 2023-04-04
Attending: SURGERY | Admitting: SURGERY
Payer: MEDICAID

## 2023-04-04 ENCOUNTER — ANESTHESIA EVENT (OUTPATIENT)
Dept: SURGERY | Facility: HOSPITAL | Age: 28
End: 2023-04-04
Payer: MEDICAID

## 2023-04-04 VITALS
SYSTOLIC BLOOD PRESSURE: 121 MMHG | DIASTOLIC BLOOD PRESSURE: 85 MMHG | OXYGEN SATURATION: 98 % | TEMPERATURE: 96 F | RESPIRATION RATE: 18 BRPM | HEART RATE: 67 BPM

## 2023-04-04 DIAGNOSIS — K80.20 GALLSTONES: Primary | ICD-10-CM

## 2023-04-04 PROCEDURE — 63600175 PHARM REV CODE 636 W HCPCS: Performed by: SURGERY

## 2023-04-04 PROCEDURE — 47562 PR LAP,CHOLECYSTECTOMY: ICD-10-PCS | Mod: ,,, | Performed by: SURGERY

## 2023-04-04 PROCEDURE — 88304 PR  SURG PATH,LEVEL III: ICD-10-PCS | Mod: 26,,, | Performed by: PATHOLOGY

## 2023-04-04 PROCEDURE — 37000009 HC ANESTHESIA EA ADD 15 MINS: Performed by: SURGERY

## 2023-04-04 PROCEDURE — 00790 ANES IPER UPR ABD NOS: CPT | Mod: QZ | Performed by: NURSE ANESTHETIST, CERTIFIED REGISTERED

## 2023-04-04 PROCEDURE — 36000708 HC OR TIME LEV III 1ST 15 MIN: Performed by: SURGERY

## 2023-04-04 PROCEDURE — 47562 LAPAROSCOPIC CHOLECYSTECTOMY: CPT | Mod: ,,, | Performed by: SURGERY

## 2023-04-04 PROCEDURE — 37000008 HC ANESTHESIA 1ST 15 MINUTES: Performed by: SURGERY

## 2023-04-04 PROCEDURE — 88304 TISSUE EXAM BY PATHOLOGIST: CPT | Mod: 26,,, | Performed by: PATHOLOGY

## 2023-04-04 PROCEDURE — 27201423 OPTIME MED/SURG SUP & DEVICES STERILE SUPPLY: Performed by: SURGERY

## 2023-04-04 PROCEDURE — 25000003 PHARM REV CODE 250: Performed by: SURGERY

## 2023-04-04 PROCEDURE — 88304 TISSUE EXAM BY PATHOLOGIST: CPT | Performed by: PATHOLOGY

## 2023-04-04 PROCEDURE — 00790 ANES IPER UPR ABD NOS: CPT | Performed by: SURGERY

## 2023-04-04 PROCEDURE — 25000003 PHARM REV CODE 250: Performed by: NURSE ANESTHETIST, CERTIFIED REGISTERED

## 2023-04-04 PROCEDURE — 71000033 HC RECOVERY, INTIAL HOUR: Performed by: SURGERY

## 2023-04-04 PROCEDURE — 63600175 PHARM REV CODE 636 W HCPCS: Performed by: NURSE ANESTHETIST, CERTIFIED REGISTERED

## 2023-04-04 PROCEDURE — 36000709 HC OR TIME LEV III EA ADD 15 MIN: Performed by: SURGERY

## 2023-04-04 PROCEDURE — D9220AH HC ANESTHESIA PROFESSIONAL FEE: Mod: QZ | Performed by: NURSE ANESTHETIST, CERTIFIED REGISTERED

## 2023-04-04 RX ORDER — BUPIVACAINE HYDROCHLORIDE AND EPINEPHRINE 5; 5 MG/ML; UG/ML
INJECTION, SOLUTION EPIDURAL; INTRACAUDAL; PERINEURAL
Status: DISCONTINUED
Start: 2023-04-04 | End: 2023-04-04 | Stop reason: HOSPADM

## 2023-04-04 RX ORDER — ONDANSETRON HYDROCHLORIDE 2 MG/ML
INJECTION, SOLUTION INTRAMUSCULAR; INTRAVENOUS
Status: DISCONTINUED | OUTPATIENT
Start: 2023-04-04 | End: 2023-04-04

## 2023-04-04 RX ORDER — ONDANSETRON 2 MG/ML
4 INJECTION INTRAMUSCULAR; INTRAVENOUS EVERY 12 HOURS PRN
Status: DISCONTINUED | OUTPATIENT
Start: 2023-04-04 | End: 2023-04-04 | Stop reason: HOSPADM

## 2023-04-04 RX ORDER — ACETAMINOPHEN 10 MG/ML
INJECTION, SOLUTION INTRAVENOUS
Status: DISCONTINUED | OUTPATIENT
Start: 2023-04-04 | End: 2023-04-04

## 2023-04-04 RX ORDER — SODIUM CHLORIDE 9 MG/ML
INJECTION, SOLUTION INTRAVENOUS CONTINUOUS
Status: DISCONTINUED | OUTPATIENT
Start: 2023-04-04 | End: 2023-04-04 | Stop reason: HOSPADM

## 2023-04-04 RX ORDER — SODIUM CHLORIDE, SODIUM LACTATE, POTASSIUM CHLORIDE, CALCIUM CHLORIDE 600; 310; 30; 20 MG/100ML; MG/100ML; MG/100ML; MG/100ML
INJECTION, SOLUTION INTRAVENOUS CONTINUOUS PRN
Status: DISCONTINUED | OUTPATIENT
Start: 2023-04-04 | End: 2023-04-04

## 2023-04-04 RX ORDER — MIDAZOLAM HYDROCHLORIDE 1 MG/ML
INJECTION INTRAMUSCULAR; INTRAVENOUS
Status: DISCONTINUED | OUTPATIENT
Start: 2023-04-04 | End: 2023-04-04

## 2023-04-04 RX ORDER — HYDROMORPHONE HYDROCHLORIDE 2 MG/ML
INJECTION, SOLUTION INTRAMUSCULAR; INTRAVENOUS; SUBCUTANEOUS
Status: DISCONTINUED | OUTPATIENT
Start: 2023-04-04 | End: 2023-04-04

## 2023-04-04 RX ORDER — FENTANYL CITRATE 50 UG/ML
INJECTION, SOLUTION INTRAMUSCULAR; INTRAVENOUS
Status: DISCONTINUED | OUTPATIENT
Start: 2023-04-04 | End: 2023-04-04

## 2023-04-04 RX ORDER — KETOROLAC TROMETHAMINE 30 MG/ML
INJECTION, SOLUTION INTRAMUSCULAR; INTRAVENOUS
Status: DISCONTINUED | OUTPATIENT
Start: 2023-04-04 | End: 2023-04-04

## 2023-04-04 RX ORDER — LIDOCAINE HYDROCHLORIDE 20 MG/ML
INJECTION, SOLUTION EPIDURAL; INFILTRATION; INTRACAUDAL; PERINEURAL
Status: DISCONTINUED | OUTPATIENT
Start: 2023-04-04 | End: 2023-04-04

## 2023-04-04 RX ORDER — PROPOFOL 10 MG/ML
VIAL (ML) INTRAVENOUS
Status: DISCONTINUED | OUTPATIENT
Start: 2023-04-04 | End: 2023-04-04

## 2023-04-04 RX ORDER — HYDROCODONE BITARTRATE AND ACETAMINOPHEN 5; 325 MG/1; MG/1
1 TABLET ORAL EVERY 6 HOURS PRN
Qty: 20 TABLET | Refills: 0 | Status: SHIPPED | OUTPATIENT
Start: 2023-04-04

## 2023-04-04 RX ORDER — HYDROMORPHONE HYDROCHLORIDE 1 MG/ML
1 INJECTION, SOLUTION INTRAMUSCULAR; INTRAVENOUS; SUBCUTANEOUS EVERY 4 HOURS PRN
Status: DISCONTINUED | OUTPATIENT
Start: 2023-04-04 | End: 2023-04-04 | Stop reason: HOSPADM

## 2023-04-04 RX ORDER — PHENYLEPHRINE HYDROCHLORIDE 10 MG/ML
INJECTION INTRAVENOUS
Status: DISCONTINUED | OUTPATIENT
Start: 2023-04-04 | End: 2023-04-04

## 2023-04-04 RX ORDER — HYDROCODONE BITARTRATE AND ACETAMINOPHEN 5; 325 MG/1; MG/1
1 TABLET ORAL EVERY 4 HOURS PRN
Status: DISCONTINUED | OUTPATIENT
Start: 2023-04-04 | End: 2023-04-04 | Stop reason: HOSPADM

## 2023-04-04 RX ORDER — BUPIVACAINE HYDROCHLORIDE AND EPINEPHRINE 5; 5 MG/ML; UG/ML
INJECTION, SOLUTION EPIDURAL; INTRACAUDAL; PERINEURAL
Status: DISCONTINUED | OUTPATIENT
Start: 2023-04-04 | End: 2023-04-04 | Stop reason: HOSPADM

## 2023-04-04 RX ORDER — ROCURONIUM BROMIDE 10 MG/ML
INJECTION, SOLUTION INTRAVENOUS
Status: DISCONTINUED | OUTPATIENT
Start: 2023-04-04 | End: 2023-04-04

## 2023-04-04 RX ADMIN — FENTANYL CITRATE 150 MCG: 0.05 INJECTION, SOLUTION INTRAMUSCULAR; INTRAVENOUS at 11:04

## 2023-04-04 RX ADMIN — FENTANYL CITRATE 100 MCG: 0.05 INJECTION, SOLUTION INTRAMUSCULAR; INTRAVENOUS at 11:04

## 2023-04-04 RX ADMIN — HYDROMORPHONE HYDROCHLORIDE 1 MG: 1 INJECTION, SOLUTION INTRAMUSCULAR; INTRAVENOUS; SUBCUTANEOUS at 02:04

## 2023-04-04 RX ADMIN — MIDAZOLAM HYDROCHLORIDE 2 MG: 1 INJECTION, SOLUTION INTRAMUSCULAR; INTRAVENOUS at 11:04

## 2023-04-04 RX ADMIN — ACETAMINOPHEN 1000 MG: 10 INJECTION, SOLUTION INTRAVENOUS at 11:04

## 2023-04-04 RX ADMIN — PHENYLEPHRINE HYDROCHLORIDE 200 MCG: 10 INJECTION INTRAVENOUS at 11:04

## 2023-04-04 RX ADMIN — HYDROCODONE BITARTRATE AND ACETAMINOPHEN 1 TABLET: 5; 325 TABLET ORAL at 01:04

## 2023-04-04 RX ADMIN — PROPOFOL 170 MG: 10 INJECTION, EMULSION INTRAVENOUS at 11:04

## 2023-04-04 RX ADMIN — SUGAMMADEX 200 MG: 100 INJECTION, SOLUTION INTRAVENOUS at 11:04

## 2023-04-04 RX ADMIN — KETOROLAC TROMETHAMINE 30 MG: 30 INJECTION, SOLUTION INTRAMUSCULAR at 11:04

## 2023-04-04 RX ADMIN — LIDOCAINE HYDROCHLORIDE 60 MG: 20 INJECTION, SOLUTION EPIDURAL; INFILTRATION; INTRACAUDAL; PERINEURAL at 11:04

## 2023-04-04 RX ADMIN — ROCURONIUM BROMIDE 35 MG: 10 INJECTION, SOLUTION INTRAVENOUS at 11:04

## 2023-04-04 RX ADMIN — SODIUM CHLORIDE, SODIUM LACTATE, POTASSIUM CHLORIDE, AND CALCIUM CHLORIDE: .6; .31; .03; .02 INJECTION, SOLUTION INTRAVENOUS at 11:04

## 2023-04-04 RX ADMIN — ONDANSETRON 8 MG: 2 INJECTION INTRAMUSCULAR; INTRAVENOUS at 11:04

## 2023-04-04 NOTE — BRIEF OP NOTE
[facility] - Surgery  Brief Operative Note    Surgery Date: 4/4/2023     Surgeon(s) and Role:     * Lane Her Jr., MD - Primary    Assisting Surgeon: None    Pre-op Diagnosis:  Gallstones [K80.20]    Post-op Diagnosis:  Post-Op Diagnosis Codes:     * Gallstones [K80.20]    Procedure(s) (LRB):  CHOLECYSTECTOMY, LAPAROSCOPIC (N/A)    Anesthesia: General    Operative Findings:  Cholelithiasis    Estimated Blood Loss:  Minimal         Specimens:  Gallbladder  Specimen (24h ago, onward)       Start     Ordered    Pending  Specimen to Pathology, Surgery General Surgery  Once        Comments: Pre- Gallstones  Spec- Gallbladder  Post- same  Proc- Lap Anastasia   Dr Her   References:    Click here for ordering Quick Tip   Question Answer Comment   Procedure Type: General Surgery    Specimen Class: Routine/Screening        Pending                      Discharge Note    OUTCOME: Patient tolerated treatment/procedure well without complication and is now ready for discharge.    DISPOSITION: Home or Self Care    FINAL DIAGNOSIS:  Gallstones    FOLLOWUP: In clinic    DISCHARGE INSTRUCTIONS:    Discharge Procedure Orders   Diet general     No dressing needed

## 2023-04-04 NOTE — PLAN OF CARE
Pt vss. Pt ambulated to bathroom and urinated without difficulty. Pt ate without nausea. Pt reports tolerable pain. Follow up scheduled. RX brought up to bedside. Discharge instructions discussed. Pt and mother Verbalized understanding.          Problem: Adult Inpatient Plan of Care  Goal: Plan of Care Review  Outcome: Adequate for Care Transition  Goal: Patient-Specific Goal (Individualized)  Outcome: Adequate for Care Transition  Goal: Absence of Hospital-Acquired Illness or Injury  Outcome: Adequate for Care Transition  Goal: Optimal Comfort and Wellbeing  Outcome: Adequate for Care Transition  Goal: Readiness for Transition of Care  Outcome: Adequate for Care Transition

## 2023-04-04 NOTE — TRANSFER OF CARE
Anesthesia Transfer of Care Note    Patient: Elizabeth Coreas    Procedure(s) Performed: Procedure(s) (LRB):  CHOLECYSTECTOMY, LAPAROSCOPIC (N/A)    Patient location: PACU    Anesthesia Type: general    Transport from OR: Transported from OR on 6-10 L/min O2 by face mask with adequate spontaneous ventilation    Post pain: adequate analgesia    Post assessment: no apparent anesthetic complications and tolerated procedure well    Post vital signs: stable    Level of consciousness: sedated    Nausea/Vomiting: no nausea/vomiting    Complications: none    Transfer of care protocol was followed      Last vitals:   Visit Vitals  /85 (BP Location: Right arm, Patient Position: Lying)   Pulse 67   Temp 35.7 °C (96.3 °F) (Axillary)   Resp 18   LMP  (LMP Unknown)   SpO2 98%   Breastfeeding No

## 2023-04-04 NOTE — OP NOTE
[facility] - Surgery  Cholecystectomy with IOC  Procedure Note    SUMMARY     Date of Procedure: 4/4/2023     Procedure:  Laparoscopic cholecystectomy    Provider: Lane Her Jr, MD    Assisting Provider:  None    Indications: This patient presents with symptomatic gallbladder disease and will undergo laparoscopic cholecystectomy.    Pre-Operative Diagnosis: Calculus of gallbladder with other cholecystitis, without mention of obstruction    Post-Operative Diagnosis: Calculus of gallbladder with other cholecystitis, without mention of obstruction    Anesthesia:  General    Technical Procedures Used:  Laparoscopic     Description of the Findings of the Procedure:     The patient was seen again in the Holding Room. The risks, benefits, complications, treatment options, and expected outcomes were discussed with the patient. The possibilities of reaction to medication, pulmonary aspiration, perforation of viscus, bleeding, recurrent infection, finding a normal gallbladder, the need for additional procedures, failure to diagnose a condition, the possible need to convert to an open procedure, and creating a complication requiring transfusion or operation were discussed with the patient. The patient and/or family concurred with the proposed plan, giving informed consent. The site of surgery properly noted/marked. The patient was taken to Operating Room, identified as Elizabeth Coreas and the procedure verified as Laparoscopic Cholecystectomy A Time Out was held and the above information confirmed.    Prior to the induction of general anesthesia, antibiotic prophylaxis was administered. General endotracheal anesthesia was then administered and tolerated well. After the induction, the abdomen was prepped in the usual sterile fashion. The patient was positioned in the supine position.    Local anesthetic agent was injected into the skin prior to any skin incision.  A Veress needle was passed through the center of the umbilicus  aspirated and flushed saline.  Saline drop test was positive.  Pneumoperitoneum was established to 15 mmHg.  A 5 mm trocar was then placed at the umbilicus and then the 5 mm 30 degree camera was then placed inside of the abdominal cavity.  There was no evidence of any intra-abdominal injuries.  Patient was then placed in reverse Trendelenburg right side up.  I placed an 11 mm subxiphoid trocar under direct vision and then 2 right upper quadrant 5 mm trocars under direct vision.    The gallbladder was identified, the fundus grasped and retracted cephalad. Adhesions were lysed bluntly and with the electrocautery where indicated, taking care not to injure any adjacent organs or viscus. The infundibulum was grasped and retracted laterally, exposing the peritoneum overlying the triangle of Calot. This was then divided and exposed in a blunt fashion. The cystic duct was clearly identified and bluntly dissected circumferentially. The junctions of the gallbladder, cystic duct and common bile duct were clearly identified prior to the division of any linear structure.     The cystic duct was then doubly ligated with surgical clips on the patient side and singly clipped on the gallbladder side and divided. The cystic artery was identified, dissected free, ligated with clips and divided as well.     The gallbladder was dissected from the liver bed in retrograde fashion with the electrocautery. The gallbladder was removed.  There was no spillage of any bile.  Good hemostasis was obtained.    Pneumoperitoneum was completely reduced after viewing removal of the trocars under direct vision. The 11 mm subxiphoid fascia was closed with a single interrupted 0 Vicryl suture and all skin was closed with 4-0 Monocryl subcuticular and Dermabond glue.    Instrument, sponge, and needle counts were correct at closure and at the conclusion of the case.     Cholecystitis with Cholelithiasis           Significant Surgical Tasks Conducted by the  Assistant(s), if Applicable:     Complications: None; patient tolerated the procedure well.    Total IV Fluids: mL    Estimated Blood Loss (EBL): Minimal     Drains:            Implants:     Specimens: Gallbladder           Condition: stable    Disposition: PACU - hemodynamically stable.    Attestation: I performed the procedure.

## 2023-04-04 NOTE — ANESTHESIA POSTPROCEDURE EVALUATION
Anesthesia Post Evaluation    Patient: Elizabeth Coreas    Procedure(s) Performed: Procedure(s) (LRB):  CHOLECYSTECTOMY, LAPAROSCOPIC (N/A)    Final Anesthesia Type: general      Patient location during evaluation: PACU  Patient participation: Yes- Able to Participate  Level of consciousness: awake and alert, oriented and awake  Post-procedure vital signs: reviewed and stable  Pain management: adequate  Airway patency: patent    PONV status at discharge: No PONV  Anesthetic complications: no      Cardiovascular status: blood pressure returned to baseline  Respiratory status: unassisted, spontaneous ventilation and room air  Hydration status: euvolemic  Follow-up not needed.  Comments: [facility]          Vitals Value Taken Time   /85 04/04/23 1246   Temp 35.7 °C (96.3 °F) 04/04/23 1246   Pulse 67 04/04/23 1246   Resp 18 04/04/23 1424   SpO2 98 % 04/04/23 1246         Event Time   Out of Recovery 12:38:51         Pain/Anibal Score: Pain Rating Prior to Med Admin: 7 (4/4/2023  2:24 PM)  Anibal Score: 10 (4/4/2023 12:18 PM)  Modified Anibal Score: 20 (4/4/2023 12:18 PM)

## 2023-04-04 NOTE — ANESTHESIA PREPROCEDURE EVALUATION
04/04/2023  Elizabeth Coreas is a 27 y.o., female.      Pre-op Assessment    I have reviewed the Patient Summary Reports.     I have reviewed the Nursing Notes.    I have reviewed the Medications.     Review of Systems  Anesthesia Hx:  No problems with previous Anesthesia    Social:  Non-Smoker, No Alcohol Use    Hematology/Oncology:  Hematology Normal   Oncology Normal     EENT/Dental:EENT/Dental Normal   Cardiovascular:  Cardiovascular Normal Exercise tolerance: good     Pulmonary:  Pulmonary Normal    Renal/:  Renal/ Normal     Hepatic/GI:   GERD, well controlled    Musculoskeletal:  Musculoskeletal Normal    Neurological:  Neurology Normal    Endocrine:  Endocrine Normal    Dermatological:  Skin Normal    Psych:  Psychiatric Normal           Physical Exam  General: Well nourished    Airway:  Mallampati: II / I  Mouth Opening: Normal        Anesthesia Plan  Type of Anesthesia, risks & benefits discussed:    Anesthesia Type: Gen ETT  Intra-op Monitoring Plan: Standard ASA Monitors  Post Op Pain Control Plan: multimodal analgesia  Induction:  IV  Airway Plan: Direct  Informed Consent: Informed consent signed with the Patient and all parties understand the risks and agree with anesthesia plan.  All questions answered. Patient consented to blood products? Yes  ASA Score: 2  Day of Surgery Review of History & Physical: H&P Update referred to the surgeon/provider.I have interviewed and examined the patient. I have reviewed the patient's H&P dated: 4/4/23. There are no significant changes.     Ready For Surgery From Anesthesia Perspective.     .

## 2023-04-04 NOTE — ANESTHESIA PROCEDURE NOTES
Intubation    Date/Time: 4/4/2023 11:31 AM  Performed by: Robert Rogers CRNA  Authorized by: Robert Rogers CRNA     Intubation:     Induction:  Intravenous    Intubated:  Postinduction    Mask Ventilation:  Easy mask    Attempts:  1    Attempted By:  CRNA    Method of Intubation:  Direct    Blade:  Wan 4    Laryngeal View Grade: Grade I - full view of cords      Difficult Airway Encountered?: No      Complications:  None    Airway Device:  Direct and oral endotracheal tube    Airway Device Size:  7.5    Style/Cuff Inflation:  Cuffed (inflated to minimal occlusive pressure)    Inflation Amount (mL):  5    Tube secured:  20    Secured at:  The lips    Placement Verified By:  Auscultation    Complicating Factors:  None    Findings Post-Intubation:  Bilateral breath sounds, positive ETCO2 and atraumatic / condition of teeth unchanged

## 2023-04-11 LAB
FINAL PATHOLOGIC DIAGNOSIS: NORMAL
GROSS: NORMAL
Lab: NORMAL

## 2023-04-18 ENCOUNTER — OFFICE VISIT (OUTPATIENT)
Dept: SURGERY | Facility: CLINIC | Age: 28
End: 2023-04-18
Payer: MEDICAID

## 2023-04-18 VITALS
DIASTOLIC BLOOD PRESSURE: 62 MMHG | HEIGHT: 67 IN | SYSTOLIC BLOOD PRESSURE: 102 MMHG | HEART RATE: 60 BPM | OXYGEN SATURATION: 98 % | WEIGHT: 204.38 LBS | BODY MASS INDEX: 32.08 KG/M2

## 2023-04-18 DIAGNOSIS — K80.20 GALLSTONES: Primary | ICD-10-CM

## 2023-04-18 PROCEDURE — 99213 OFFICE O/P EST LOW 20 MIN: CPT | Mod: PBBFAC | Performed by: SURGERY

## 2023-04-18 PROCEDURE — 3008F BODY MASS INDEX DOCD: CPT | Mod: CPTII,,, | Performed by: SURGERY

## 2023-04-18 PROCEDURE — 99499 UNLISTED E&M SERVICE: CPT | Mod: S$PBB,,, | Performed by: SURGERY

## 2023-04-18 PROCEDURE — 99999 PR PBB SHADOW E&M-EST. PATIENT-LVL III: CPT | Mod: PBBFAC,,, | Performed by: SURGERY

## 2023-04-18 PROCEDURE — 3074F PR MOST RECENT SYSTOLIC BLOOD PRESSURE < 130 MM HG: ICD-10-PCS | Mod: CPTII,,, | Performed by: SURGERY

## 2023-04-18 PROCEDURE — 3008F PR BODY MASS INDEX (BMI) DOCUMENTED: ICD-10-PCS | Mod: CPTII,,, | Performed by: SURGERY

## 2023-04-18 PROCEDURE — 1159F PR MEDICATION LIST DOCUMENTED IN MEDICAL RECORD: ICD-10-PCS | Mod: CPTII,,, | Performed by: SURGERY

## 2023-04-18 PROCEDURE — 1160F PR REVIEW ALL MEDS BY PRESCRIBER/CLIN PHARMACIST DOCUMENTED: ICD-10-PCS | Mod: CPTII,,, | Performed by: SURGERY

## 2023-04-18 PROCEDURE — 99499 NO LOS: ICD-10-PCS | Mod: S$PBB,,, | Performed by: SURGERY

## 2023-04-18 PROCEDURE — 3078F DIAST BP <80 MM HG: CPT | Mod: CPTII,,, | Performed by: SURGERY

## 2023-04-18 PROCEDURE — 99999 PR PBB SHADOW E&M-EST. PATIENT-LVL III: ICD-10-PCS | Mod: PBBFAC,,, | Performed by: SURGERY

## 2023-04-18 PROCEDURE — 1159F MED LIST DOCD IN RCRD: CPT | Mod: CPTII,,, | Performed by: SURGERY

## 2023-04-18 PROCEDURE — 3078F PR MOST RECENT DIASTOLIC BLOOD PRESSURE < 80 MM HG: ICD-10-PCS | Mod: CPTII,,, | Performed by: SURGERY

## 2023-04-18 PROCEDURE — 1160F RVW MEDS BY RX/DR IN RCRD: CPT | Mod: CPTII,,, | Performed by: SURGERY

## 2023-04-18 PROCEDURE — 3074F SYST BP LT 130 MM HG: CPT | Mod: CPTII,,, | Performed by: SURGERY

## 2023-04-18 RX ORDER — PROMETHAZINE HYDROCHLORIDE AND DEXTROMETHORPHAN HYDROBROMIDE 6.25; 15 MG/5ML; MG/5ML
SYRUP ORAL
COMMUNITY
Start: 2023-01-30

## 2023-04-18 NOTE — PROGRESS NOTES
Patient is 2 weeks postop lap choly.  Doing fine.  No complaints.  Tolerating diet.  No fevers.  Incisions healing well.  She will be ready for work this Monday.  She can follow up as needed.

## 2023-06-29 ENCOUNTER — PATIENT MESSAGE (OUTPATIENT)
Dept: NEUROLOGY | Facility: CLINIC | Age: 28
End: 2023-06-29
Payer: MEDICAID

## 2023-06-29 NOTE — TELEPHONE ENCOUNTER
At her last visit, her migraines had improved, and preventative therapy was not needed. If her migraines are worse and preventative therapy is needed now, let's have her come in for a visit for further evaluation to determine if restarting Pamelor is the most appropriate treatment at this time. Last seen in 11/2022.

## 2024-08-19 DIAGNOSIS — G43.909 MIGRAINE: Primary | ICD-10-CM

## 2024-08-26 DIAGNOSIS — G43.909 MIGRAINE: Primary | ICD-10-CM

## 2024-09-16 ENCOUNTER — OFFICE VISIT (OUTPATIENT)
Dept: NEUROLOGY | Facility: CLINIC | Age: 29
End: 2024-09-16
Payer: COMMERCIAL

## 2024-09-16 VITALS
SYSTOLIC BLOOD PRESSURE: 108 MMHG | HEART RATE: 76 BPM | DIASTOLIC BLOOD PRESSURE: 76 MMHG | WEIGHT: 186 LBS | HEIGHT: 67 IN | BODY MASS INDEX: 29.19 KG/M2

## 2024-09-16 DIAGNOSIS — G43.909 MIGRAINE: ICD-10-CM

## 2024-09-16 DIAGNOSIS — G43.909 MIGRAINE WITHOUT STATUS MIGRAINOSUS, NOT INTRACTABLE, UNSPECIFIED MIGRAINE TYPE: Primary | ICD-10-CM

## 2024-09-16 PROCEDURE — 3008F BODY MASS INDEX DOCD: CPT | Mod: CPTII,S$GLB,, | Performed by: NURSE PRACTITIONER

## 2024-09-16 PROCEDURE — 1159F MED LIST DOCD IN RCRD: CPT | Mod: CPTII,S$GLB,, | Performed by: NURSE PRACTITIONER

## 2024-09-16 PROCEDURE — 3074F SYST BP LT 130 MM HG: CPT | Mod: CPTII,S$GLB,, | Performed by: NURSE PRACTITIONER

## 2024-09-16 PROCEDURE — 99999 PR PBB SHADOW E&M-EST. PATIENT-LVL IV: CPT | Mod: PBBFAC,,, | Performed by: NURSE PRACTITIONER

## 2024-09-16 PROCEDURE — 1160F RVW MEDS BY RX/DR IN RCRD: CPT | Mod: CPTII,S$GLB,, | Performed by: NURSE PRACTITIONER

## 2024-09-16 PROCEDURE — 99214 OFFICE O/P EST MOD 30 MIN: CPT | Mod: S$GLB,,, | Performed by: NURSE PRACTITIONER

## 2024-09-16 PROCEDURE — 3078F DIAST BP <80 MM HG: CPT | Mod: CPTII,S$GLB,, | Performed by: NURSE PRACTITIONER

## 2024-09-16 RX ORDER — TENAPANOR HYDROCHLORIDE 53.2 MG/1
50 TABLET ORAL 2 TIMES DAILY
COMMUNITY

## 2024-09-16 RX ORDER — GALCANEZUMAB 120 MG/ML
120 INJECTION, SOLUTION SUBCUTANEOUS
Qty: 1 ML | Refills: 5 | Status: SHIPPED | OUTPATIENT
Start: 2024-09-16

## 2024-09-16 RX ORDER — FREMANEZUMAB-VFRM 225 MG/1.5ML
225 INJECTION SUBCUTANEOUS
Qty: 1.5 ML | Refills: 5 | Status: SHIPPED | OUTPATIENT
Start: 2024-09-16 | End: 2024-09-16 | Stop reason: ALTCHOICE

## 2024-09-16 NOTE — PROGRESS NOTES
Subjective:       Patient ID: Elizabeth Coreas is a 29 y.o. female.    Chief Complaint: Migraine (NP: Referred by Dr. Dominique Mendoza for neuro consult to evaluate for migraines: Migraines are once a week, located to right and left temporal areas. Pain is throbbing to head. Migraines lasts over 4 hours. Does get nauseated with migraines. Migraines are sensitive to light and sound. Ubrelvy helped sometimes.)      History of Present Illness:  New patient referred by Dr. Mendoza to evaluate for migraines.  29-year-old woman who started having migraines at age 10.  They always occur bitemporally and are severe pounding pain with associated photophobia, phonophobia, and nausea.  They last for at least 4 hours at a time.  She will usually also have a more dull headache the day after her migraines.  She has not been able to identify any triggers and she has no family history.  There have been no changes in the pain location or pain frequency over time.  She was seeing another neurologist in [place] and they had tried several different therapies.  She has tried topamax, amitriptyline, nortriptyline, and maxalt.  She is also on sotalol for dx of SVT.  She was most recently prescribed ubrelvy 50 mg for rescue which was initially helpful, but has seemed to lose efficacy.  She is currently taking Tylenol with Excedrin migraine for rescue.  No aura prior to the migraine        Review of Systems  I have reviewed a 12 point review of systems which is scanned into the chart        Past Medical History:   Diagnosis Date    Duodenitis     Elevated cholesterol     Endometriosis     Erosive gastritis     Gastritis     GERD (gastroesophageal reflux disease)     Heart disorder     hole in heart    Migraine headache     Migraines     Murmur     SVT (supraventricular tachycardia)     Tachycardia        Past Surgical History:   Procedure Laterality Date    CYSTOSCOPY N/A 12/10/2019    Procedure: CYSTOSCOPY;  Surgeon: Julia KELSEY  MD Gracia;  Location: [facility] OR;  Service: OB/GYN;  Laterality: N/A;    DIAGNOSTIC LAPAROSCOPY N/A 6/1/2018    Procedure: LAPAROSCOPY-DIAGNOSTIC;  Surgeon: Olivia Aguillon MD;  Location: [facility] OR;  Service: OB/GYN;  Laterality: N/A;    DILATION AND CURETTAGE OF UTERUS N/A 6/1/2018    Procedure: DILATION-CURETTAGE OF UTERUS (D AND C);  Surgeon: Olivia Aguillon MD;  Location: [facility] OR;  Service: OB/GYN;  Laterality: N/A;    FULGURATION OF ENDOMETRIOSIS N/A 6/1/2018    Procedure: FULGURATION, ENDOMETRIOSIS;  Surgeon: Olivia Aguillon MD;  Location: [facility] OR;  Service: OB/GYN;  Laterality: N/A;    HYSTERECTOMY      INTRAUTERINE DEVICE INSERTION N/A 6/1/2018    Procedure: PLACEMENT INTRAUTERINE DEVICE (IUD);  Surgeon: Olivia Aguillon MD;  Location: [facility] OR;  Service: OB/GYN;  Laterality: N/A;    LAPAROSCOPIC APPENDECTOMY Right 6/21/2019    Procedure: APPENDECTOMY, LAPAROSCOPIC;  Surgeon: Lane Her Jr., MD;  Location: [facility] OR;  Service: General;  Laterality: Right;    LAPAROSCOPIC CHOLECYSTECTOMY N/A 4/4/2023    Procedure: CHOLECYSTECTOMY, LAPAROSCOPIC;  Surgeon: Lane Her Jr., MD;  [facility];  Service: General;  Laterality: N/A;    LAPAROSCOPIC SALPINGECTOMY Bilateral 12/10/2019    Procedure: SALPINGECTOMY, LAPAROSCOPIC;  Surgeon: Julia Fagan MD;  Location: [facility];  Service: OB/GYN;  Laterality: Bilateral;    LAPAROSCOPIC SURGICAL REMOVAL OF CYST OF OVARY Left 6/1/2018    Procedure: EXCISION, CYST, OVARY, LAPAROSCOPIC;  Surgeon: Olivia Aguillon MD;  Location: [facility] OR;  Service: OB/GYN;  Laterality: Left;    LAPAROSCOPIC TOTAL HYSTERECTOMY N/A 12/10/2019    Procedure: HYSTERECTOMY, TOTAL, LAPAROSCOPIC;  Surgeon: Julia Fagan MD;  Location: [facility];  Service: OB/GYN;  Laterality: N/A;    LYSIS OF ADHESIONS N/A 6/1/2018    Procedure: LYSIS, ADHESIONS;  Surgeon: Olivia Aguillon MD;  Location: [facility] OR;  Service: OB/GYN;  Laterality: N/A;    REMOVAL OF INTRAUTERINE DEVICE (IUD) N/A 6/1/2018     Procedure: REMOVAL, INTRAUTERINE DEVICE;  Surgeon: Olivia Aguillon MD;  Location: [facility] OR;  Service: OB/GYN;  Laterality: N/A;    REPAIR OF INCARCERATED UMBILICAL HERNIA N/A 2019    Procedure: REPAIR, HERNIA, UMBILICAL, INCARCERATED;  Surgeon: Lane Her Jr., MD;  Location: [facility] OR;  Service: General;  Laterality: N/A;    THERMAL ABLATION OF ENDOMETRIUM USING HYSTEROSCOPY N/A 2018    Procedure: ABLATION ENDOMETRIAL THERMAL - NOVASURE;  Surgeon: Olivia Aguillon MD;  Location: [facility] OR;  Service: OB/GYN;  Laterality: N/A;    WISDOM TOOTH EXTRACTION  10/2012        Family History   Problem Relation Name Age of Onset    Diabetes Mother      Hyperlipidemia Mother      Hypertension Father      Hyperlipidemia Father      Cerebral palsy Sister      Diabetes Brother      Cerebral palsy Brother          half brother; dad's side    Heart disease Maternal Grandmother      Diabetes Maternal Grandmother      Heart disease Maternal Grandfather      Kidney disease Maternal Grandfather      Heart disease Paternal Grandmother      Heart disease Paternal Grandfather      Breast cancer Neg Hx      Colon cancer Neg Hx      Ovarian cancer Neg Hx      Cancer Neg Hx      Eclampsia Neg Hx      Miscarriages / Stillbirths Neg Hx       labor Neg Hx      Stroke Neg Hx          Social History     Socioeconomic History    Marital status:    Tobacco Use    Smoking status: Never    Smokeless tobacco: Never   Substance and Sexual Activity    Alcohol use: Yes     Comment: Socially    Drug use: Not Currently     Types: Hydrocodone    Sexual activity: Yes     Partners: Male     Birth control/protection: Implant     Comment:  Mirena- 2018   Social History Narrative    ** Merged History Encounter **             Outpatient Encounter Medications as of 2024   Medication Sig Dispense Refill    rosuvastatin (CRESTOR) 10 MG tablet Take 10 mg by mouth once daily.      sotaloL (BETAPACE) 120 MG Tab Take 120 mg by  "mouth 2 (two) times daily.      tenapanor (IBSRELA) 50 mg Tab Take 50 mg by mouth 2 (two) times daily.      [DISCONTINUED] UBRELVY 50 mg tablet TAKE 1 TABLET BY MOUTH DAILY AS NEEDED FOR MIGRAINE. 9 tablet 11    fremanezumab-vfrm (AJOVY AUTOINJECTOR) 225 mg/1.5 mL autoinjector Inject 1.5 mLs (225 mg total) into the skin every 28 days. 1.5 mL 5    ubrogepant (UBRELVY) 100 mg tablet Take 1 tablet (100 mg total) by mouth as needed for Migraine. 16 tablet 5    [DISCONTINUED] esomeprazole (NEXIUM) 20 MG capsule Take 20 mg by mouth once daily.      [DISCONTINUED] HYDROcodone-acetaminophen (NORCO) 5-325 mg per tablet Take 1 tablet by mouth every 6 (six) hours as needed for Pain. 20 tablet 0    [DISCONTINUED] LINZESS 145 mcg Cap capsule Take 145 mcg by mouth once daily.      [DISCONTINUED] pantoprazole (PROTONIX) 40 MG tablet Take 40 mg by mouth.      [DISCONTINUED] promethazine-dextromethorphan (PROMETHAZINE-DM) 6.25-15 mg/5 mL Syrp as needed.      [DISCONTINUED] sucralfate (CARAFATE) 1 gram tablet Take 1 g by mouth 4 (four) times daily. Takes two hours after eating       Facility-Administered Encounter Medications as of 9/16/2024   Medication Dose Route Frequency Provider Last Rate Last Admin    LIDOcaine (PF) 10 mg/ml (1%) injection 10 mg  1 mL Intradermal Once Lane Her Jr., MD          Objective:   /76 (BP Location: Left arm)   Pulse 76   Ht 5' 7" (1.702 m)   Wt 84.4 kg (186 lb)   LMP  (LMP Unknown) Comment: birth control  BMI 29.13 kg/m²        Physical Exam  General:  Alert and oriented  NAD  No overt edema    Cognition and Comprehension:  Speech and language intact  Follows commands    Cranial nerves:   CN 2_ Visual fields (full to confrontation both eyes)  CN 3, 4, 6_ Intact, FRANSICO, no nystagmus  CN 5_facial tactile sensation intact  CN 7_no face asymmetry; normal eye closure and smile  CN 8_hearing intact to spoken voice  CN 9, 10, 11_voice normal, shoulder shrug ok; deltoids not fatigable   CN " 12 tongue_protrudes mid line    Muscle Strength and Tone:  Normal upper extremity tone  Normal lower extremity tone  Normal upper extremity strength  Normal lower extremity strength    Reflexes:  Normal and symmetric    Sensation:  Intact to light touch and temperature    Coordination and Gait:  Coordination and gait are normal   No ataxia      Assessment & Plan:      1. Migraine without status migrainosus, not intractable, unspecified migraine type  Overview:  started having migraines at age 10.  They always occur bitemporally and are severe pounding pain with associated photophobia, phonophobia, and nausea.  They last for at least 4 hours at a time.  She will usually also have a more dull headache the day after her migraines.  She has tried topamax, amitriptyline, nortriptyline, and maxalt.  She is also on sotalol for dx of SVT.    Assessment & Plan:  -start ajovy for ppx.  Increase ubrelvy to 100 mg for rescue    Orders:  -     fremanezumab-vfrm (AJOVY AUTOINJECTOR) 225 mg/1.5 mL autoinjector; Inject 1.5 mLs (225 mg total) into the skin every 28 days.  Dispense: 1.5 mL; Refill: 5  -     ubrogepant (UBRELVY) 100 mg tablet; Take 1 tablet (100 mg total) by mouth as needed for Migraine.  Dispense: 16 tablet; Refill: 5    2. Migraine  -     Ambulatory referral/consult to Neurology          This note was created with the assistance of voice recognition software. There may be transcription errors as a result of using this technology however minimal. Effort has been made to assure accuracy of transcription but any obvious errors or omissions should be clarified with the author of the document.

## 2024-10-10 DIAGNOSIS — G43.909 MIGRAINE WITHOUT STATUS MIGRAINOSUS, NOT INTRACTABLE, UNSPECIFIED MIGRAINE TYPE: ICD-10-CM

## 2024-10-10 RX ORDER — GALCANEZUMAB 120 MG/ML
120 INJECTION, SOLUTION SUBCUTANEOUS
Qty: 1 ML | Refills: 5 | OUTPATIENT
Start: 2024-10-10

## 2024-11-09 DIAGNOSIS — G43.909 MIGRAINE WITHOUT STATUS MIGRAINOSUS, NOT INTRACTABLE, UNSPECIFIED MIGRAINE TYPE: ICD-10-CM

## 2024-11-11 RX ORDER — GALCANEZUMAB 120 MG/ML
120 INJECTION, SOLUTION SUBCUTANEOUS
Qty: 1 ML | Refills: 5 | OUTPATIENT
Start: 2024-11-11

## 2025-01-09 NOTE — Clinical Note
iud ordered 4/25/18- sx 6/1/18 needs to be brought to [place] in pharmacy. MP Pregnancy And Lactation Warning Text: There have not been adverse fetal risks in women taking Dupixent while pregnant. It is unknown if this medication is excreted in breast milk. Add Pregnancy And Lactation Warning To Medication Counseling?: No Dupixent Dosing: 600 mg SC day 0 then 300 mg SC every other week Dupixent Monitoring Guidelines: There is no laboratory monitoring requirement with Dupixent. Diagnosis (Required): Atopic Dermatitis/Eczematous Dermatitis Detail Level: Zone

## 2025-02-17 ENCOUNTER — OFFICE VISIT (OUTPATIENT)
Dept: NEUROLOGY | Facility: CLINIC | Age: 30
End: 2025-02-17
Payer: COMMERCIAL

## 2025-02-17 VITALS
DIASTOLIC BLOOD PRESSURE: 86 MMHG | HEART RATE: 73 BPM | HEIGHT: 67 IN | WEIGHT: 187.63 LBS | BODY MASS INDEX: 29.45 KG/M2 | SYSTOLIC BLOOD PRESSURE: 112 MMHG

## 2025-02-17 DIAGNOSIS — G43.009 MIGRAINE WITHOUT AURA AND WITHOUT STATUS MIGRAINOSUS, NOT INTRACTABLE: Primary | ICD-10-CM

## 2025-02-17 PROCEDURE — 99213 OFFICE O/P EST LOW 20 MIN: CPT | Mod: S$GLB,,, | Performed by: NURSE PRACTITIONER

## 2025-02-17 PROCEDURE — 3074F SYST BP LT 130 MM HG: CPT | Mod: CPTII,S$GLB,, | Performed by: NURSE PRACTITIONER

## 2025-02-17 PROCEDURE — 1159F MED LIST DOCD IN RCRD: CPT | Mod: CPTII,S$GLB,, | Performed by: NURSE PRACTITIONER

## 2025-02-17 PROCEDURE — 3079F DIAST BP 80-89 MM HG: CPT | Mod: CPTII,S$GLB,, | Performed by: NURSE PRACTITIONER

## 2025-02-17 PROCEDURE — 3008F BODY MASS INDEX DOCD: CPT | Mod: CPTII,S$GLB,, | Performed by: NURSE PRACTITIONER

## 2025-02-17 RX ORDER — DEXTROAMPHETAMINE SACCHARATE, AMPHETAMINE ASPARTATE, DEXTROAMPHETAMINE SULFATE AND AMPHETAMINE SULFATE 2.5; 2.5; 2.5; 2.5 MG/1; MG/1; MG/1; MG/1
TABLET ORAL
COMMUNITY
Start: 2025-01-10

## 2025-02-17 RX ORDER — CLOBETASOL PROPIONATE 0.5 MG/G
OINTMENT TOPICAL DAILY PRN
COMMUNITY
Start: 2025-02-11

## 2025-02-17 NOTE — PROGRESS NOTES
Subjective:       Patient ID: Elizabeth Coreas is a 29 y.o. female.    Chief Complaint: Migraine (Migraines have lessen to once a month. Taking Ubrelvy & Emgality)      History of Present Illness:  Follow-up visit for migraines.  She was started on Emgality for ppx and Ubrelvy for rescue.  She was having 2 migraines a week prior to starting the medication and now only has about 1 migraine a month.  She takes Ubrelvy for rescue which usually works.  If it does not work, she will take an Advil or Tylenol and that will help to relieve the pain.  No issues with injections.  Happy with results in control.            Past Medical History:   Diagnosis Date    Duodenitis     Elevated cholesterol     Endometriosis     Erosive gastritis     Gastritis     GERD (gastroesophageal reflux disease)     Heart disorder     hole in heart    Migraine headache     Migraines     Murmur     SVT (supraventricular tachycardia)     Tachycardia        Past Surgical History:   Procedure Laterality Date    CYSTOSCOPY N/A 12/10/2019    Procedure: CYSTOSCOPY;  Surgeon: Julia aFgan MD;  Location: [facility];  Service: OB/GYN;  Laterality: N/A;    DIAGNOSTIC LAPAROSCOPY N/A 6/1/2018    Procedure: LAPAROSCOPY-DIAGNOSTIC;  Surgeon: Olivia Aguillon MD;  Location: [facility];  Service: OB/GYN;  Laterality: N/A;    DILATION AND CURETTAGE OF UTERUS N/A 6/1/2018    Procedure: DILATION-CURETTAGE OF UTERUS (D AND C);  Surgeon: Olivia Aguillon MD;  Location: [facility];  Service: OB/GYN;  Laterality: N/A;    FULGURATION OF ENDOMETRIOSIS N/A 6/1/2018    Procedure: FULGURATION, ENDOMETRIOSIS;  Surgeon: Olivia Aguillon MD;  Location: [facility];  Service: OB/GYN;  Laterality: N/A;    HYSTERECTOMY      INTRAUTERINE DEVICE INSERTION N/A 6/1/2018    Procedure: PLACEMENT INTRAUTERINE DEVICE (IUD);  Surgeon: Olivia Aguillon MD;  Location: [facility];  Service: OB/GYN;  Laterality: N/A;    LAPAROSCOPIC APPENDECTOMY Right 6/21/2019    Procedure: APPENDECTOMY,  LAPAROSCOPIC;  Surgeon: Lane Her Jr., MD;  Location: [facility];  Service: General;  Laterality: Right;    LAPAROSCOPIC CHOLECYSTECTOMY N/A 4/4/2023    Procedure: CHOLECYSTECTOMY, LAPAROSCOPIC;  Surgeon: Lane Her Jr., MD;  Location: [facility] OR;  Service: General;  Laterality: N/A;    LAPAROSCOPIC SALPINGECTOMY Bilateral 12/10/2019    Procedure: SALPINGECTOMY, LAPAROSCOPIC;  Surgeon: Julia Fagan MD;  Location: [facility] OR;  Service: OB/GYN;  Laterality: Bilateral;    LAPAROSCOPIC SURGICAL REMOVAL OF CYST OF OVARY Left 6/1/2018    Procedure: EXCISION, CYST, OVARY, LAPAROSCOPIC;  Surgeon: Olivia Aguillon MD;  Location: [facility];  Service: OB/GYN;  Laterality: Left;    LAPAROSCOPIC TOTAL HYSTERECTOMY N/A 12/10/2019    Procedure: HYSTERECTOMY, TOTAL, LAPAROSCOPIC;  Surgeon: Julia Fagan MD;  Location: [facility] OR;  Service: OB/GYN;  Laterality: N/A;    LYSIS OF ADHESIONS N/A 6/1/2018    Procedure: LYSIS, ADHESIONS;  Surgeon: Olivia Aguillon MD;  Location: [facility];  Service: OB/GYN;  Laterality: N/A;    REMOVAL OF INTRAUTERINE DEVICE (IUD) N/A 6/1/2018    Procedure: REMOVAL, INTRAUTERINE DEVICE;  Surgeon: Olivia Aguillon MD;  Location: [facility];  Service: OB/GYN;  Laterality: N/A;    REPAIR OF INCARCERATED UMBILICAL HERNIA N/A 8/6/2019    Procedure: REPAIR, HERNIA, UMBILICAL, INCARCERATED;  Surgeon: Lane Her Jr., MD;  Location: [facility];  Service: General;  Laterality: N/A;    THERMAL ABLATION OF ENDOMETRIUM USING HYSTEROSCOPY N/A 6/1/2018    Procedure: ABLATION ENDOMETRIAL THERMAL - NOVASURE;  Surgeon: Olivia Aguillon MD;  Location: [facility] OR;  Service: OB/GYN;  Laterality: N/A;    WISDOM TOOTH EXTRACTION  10/2012        Family History   Problem Relation Name Age of Onset    Diabetes Mother      Hyperlipidemia Mother      Hypertension Father      Hyperlipidemia Father      Cerebral palsy Sister      Diabetes Brother      Cerebral palsy Brother          half brother; dad's side    Heart disease  "Maternal Grandmother      Diabetes Maternal Grandmother      Heart disease Maternal Grandfather      Kidney disease Maternal Grandfather      Heart disease Paternal Grandmother      Heart disease Paternal Grandfather      Breast cancer Neg Hx      Colon cancer Neg Hx      Ovarian cancer Neg Hx      Cancer Neg Hx      Eclampsia Neg Hx      Miscarriages / Stillbirths Neg Hx       labor Neg Hx      Stroke Neg Hx          Social History[1]     Encounter Medications[2]   Objective:   /86 (BP Location: Right arm, Patient Position: Sitting)   Pulse 73   Ht 5' 7" (1.702 m)   Wt 85.1 kg (187 lb 9.6 oz)   LMP  (LMP Unknown) Comment: birth control  BMI 29.38 kg/m²        Physical Exam  NAD  alert and oriented  cognition and perception intact  no aphasia  EOMI  no facial asymmetry  no dysarthria  moves all extremities symmetrically  no gross coordination abnormalities  gait normal       Assessment & Plan:      1. Migraine without aura and without status migrainosus, not intractable  Overview:  started having migraines at age 10.  They always occur bitemporally and are severe pounding pain with associated photophobia, phonophobia, and nausea.  Occur 1-2 times per week.  They last for at least 4 hours at a time.  She will usually also have a more dull headache the day after her migraines.  She has tried topamax, amitriptyline, nortriptyline, and maxalt.  She is also on sotalol for dx of SVT.    Assessment & Plan:  -continue emgality and prn ubrelvy            This note was created with the assistance of voice recognition software. There may be transcription errors as a result of using this technology however minimal. Effort has been made to assure accuracy of transcription but any obvious errors or omissions should be clarified with the author of the document.           [1]   Social History  Socioeconomic History    Marital status:    Tobacco Use    Smoking status: Never    Smokeless tobacco: Never "   Substance and Sexual Activity    Alcohol use: Yes     Comment: Socially    Drug use: Not Currently     Types: Hydrocodone    Sexual activity: Yes     Partners: Male     Birth control/protection: Implant     Comment:  Mirena- 6/1/2018   Social History Narrative    ** Merged History Encounter **        [2]   Outpatient Encounter Medications as of 2/17/2025   Medication Sig Dispense Refill    clobetasol 0.05% (TEMOVATE) 0.05 % Oint Apply topically daily as needed.      dextroamphetamine-amphetamine 10 mg Tab Take by mouth.      galcanezumab-gnlm (EMGALITY PEN) 120 mg/mL PnIj Inject 1 mL (120 mg total) into the skin every 28 days. At start loading dose dispense 2 mL, then 1 mL q 28 days thereafter 1 mL 5    rosuvastatin (CRESTOR) 10 MG tablet Take 10 mg by mouth once daily.      sotaloL (BETAPACE) 120 MG Tab Take 120 mg by mouth 2 (two) times daily.      tenapanor (IBSRELA) 50 mg Tab Take 50 mg by mouth 2 (two) times daily.      ubrogepant (UBRELVY) 100 mg tablet Take 1 tablet (100 mg total) by mouth as needed for Migraine. 16 tablet 5     Facility-Administered Encounter Medications as of 2/17/2025   Medication Dose Route Frequency Provider Last Rate Last Admin    LIDOcaine (PF) 10 mg/ml (1%) injection 10 mg  1 mL Intradermal Once Laen Her Jr., MD

## 2025-03-06 ENCOUNTER — PATIENT MESSAGE (OUTPATIENT)
Dept: NEUROLOGY | Facility: CLINIC | Age: 30
End: 2025-03-06
Payer: COMMERCIAL

## 2025-03-06 DIAGNOSIS — G43.909 MIGRAINE WITHOUT STATUS MIGRAINOSUS, NOT INTRACTABLE, UNSPECIFIED MIGRAINE TYPE: ICD-10-CM

## 2025-03-06 RX ORDER — GALCANEZUMAB 120 MG/ML
120 INJECTION, SOLUTION SUBCUTANEOUS
Qty: 1 ML | Refills: 5 | Status: SHIPPED | OUTPATIENT
Start: 2025-03-06

## 2025-08-27 DIAGNOSIS — G43.909 MIGRAINE WITHOUT STATUS MIGRAINOSUS, NOT INTRACTABLE, UNSPECIFIED MIGRAINE TYPE: ICD-10-CM

## 2025-08-28 RX ORDER — GALCANEZUMAB 120 MG/ML
120 INJECTION, SOLUTION SUBCUTANEOUS
Qty: 1 ML | Refills: 5 | Status: SHIPPED | OUTPATIENT
Start: 2025-08-28

## (undated) DEVICE — TUBING LAPARSCOPIC INSUFFLATIN

## (undated) DEVICE — TUBING INSUFFLATOR W/FILTR 10

## (undated) DEVICE — JELLY SURGILUBE 5GR

## (undated) DEVICE — DRAPE LAPSCP CHOLE 122X102X78

## (undated) DEVICE — PACK GENERAL SURGERY

## (undated) DEVICE — SOL CLEARIFY VISUALIZATION LAP

## (undated) DEVICE — SUT MONOCYRL 4-0 PS2 UND

## (undated) DEVICE — GLOVE 8 PROTEXIS PI BLUE

## (undated) DEVICE — SCISSOR 5MMX35CM DIRECT DRIVE

## (undated) DEVICE — LEGGINGS 48X31 INCH

## (undated) DEVICE — NDL ECLIPSE SAFETY 18GX1-1/2IN

## (undated) DEVICE — TRAY CATH FOLEY 16FR STAT LOC

## (undated) DEVICE — CANNULA LAP SEAL Z THRD 5X100

## (undated) DEVICE — NDL INSUF ULTRA VERESS 120MM

## (undated) DEVICE — SYR W/CANNULA 10CC

## (undated) DEVICE — SYR 10CC LUER LOCK

## (undated) DEVICE — NDL SAFETY 25G X 1.5 ECLIPSE

## (undated) DEVICE — PACK LAPAROSCOPY

## (undated) DEVICE — SEE MEDLINE ITEM 156930

## (undated) DEVICE — Device

## (undated) DEVICE — SEE MEDLINE ITEM 157110

## (undated) DEVICE — KIT WING PAD POSITIONING

## (undated) DEVICE — ADHESIVE DERMABOND ADVANCED

## (undated) DEVICE — TROCAR KII FIOS 5MM X 100MM

## (undated) DEVICE — IRRIGATOR ENDOSCOPY DISP.

## (undated) DEVICE — TROCAR KII FIOS ZTHREAD 12X100

## (undated) DEVICE — APPLICATOR CHLORAPREP ORN 26ML

## (undated) DEVICE — DISSECTOR EPIX LAPA 5MMX35CM

## (undated) DEVICE — SPONGE LAP 18X18 PREWASHED

## (undated) DEVICE — PAD PERI POST REPLACEMNT

## (undated) DEVICE — SEE MEDLINE ITEM 146296

## (undated) DEVICE — UTERINE MANIPULATOR HUMI 6003

## (undated) DEVICE — SEE L#133928

## (undated) DEVICE — PAD UNDERPAD 30X30

## (undated) DEVICE — GLOVE BIOGEL SKINSENSE PI 6.5

## (undated) DEVICE — SOL NS 1000CC

## (undated) DEVICE — SUT 0 VICRYL / UR6 (J603)

## (undated) DEVICE — SEE MEDLINE ITEM 157117

## (undated) DEVICE — ENDOSTITCH INSTRUMENT

## (undated) DEVICE — CLOSURE SKIN STERI STRIP 1/2X4

## (undated) DEVICE — EVACUATOR KIT SMOKE PLUME AWAY

## (undated) DEVICE — ELECTRODE REM PLYHSV RETURN 9

## (undated) DEVICE — GLOVE PROTEXIS LTX MICRO  7.5

## (undated) DEVICE — TOWELS STERILE 18 X 25.5

## (undated) DEVICE — TIP RUMI GREEN DISPOSABLE6.7MM

## (undated) DEVICE — PAD ABD 8X10 STERILE

## (undated) DEVICE — KIT PREP E-Z WET W/2-6

## (undated) DEVICE — ENDOSTITCH POLYSORB 0 ES-9

## (undated) DEVICE — NDL HYPO REG 25G X 1 1/2

## (undated) DEVICE — SUT 0 VICRYL PLUS CT-1 27IN

## (undated) DEVICE — APPLIER CLIP EPIX UNIV 5X34

## (undated) DEVICE — SYR 50CC LL

## (undated) DEVICE — BANDAGE ADHESIVE

## (undated) DEVICE — TIP RUMI KOH-EFFICIENT 4.0

## (undated) DEVICE — SUT MCRYL PLUS 4-0 PS2 27IN

## (undated) DEVICE — GLOVE BIOGEL ECLIPSE SZ 8.5

## (undated) DEVICE — FORCEP DISSECTING LYONS PKS

## (undated) DEVICE — PORT ACCESS 5MM W/120MM

## (undated) DEVICE — SET CYSTO IRRIGATION UNIV SPIK

## (undated) DEVICE — KITTNER ENDOSCOPIC SINGLE TIP

## (undated) DEVICE — SUT VICRYL 3-0 8-18 PS-1

## (undated) DEVICE — BAG TISS RETRV MONARCH 10MM

## (undated) DEVICE — TROCAR ENDOPATH XCEL 11MM 10CM

## (undated) DEVICE — SUCTION IRRIGATOR PROBE

## (undated) DEVICE — TROCAR ENDO Z THREAD KII 5X100

## (undated) DEVICE — SOL 9P NACL IRR PIC IL

## (undated) DEVICE — DEVICE ABLATION NOVASURE DISP

## (undated) DEVICE — GOWN SURGEON XLG

## (undated) DEVICE — STAPLER ECHELON ARTC 60MM 28CM

## (undated) DEVICE — PACK LAPAROSCOPY BAPTIST

## (undated) DEVICE — STAPLES RELOAD ECHELON 60

## (undated) DEVICE — SUT CTD VICRYL 3-0 PS-1

## (undated) DEVICE — SOL STRL WATER INJ 1000ML BG

## (undated) DEVICE — UNDERGLOVES BIOGEL PI SZ 7 LF

## (undated) DEVICE — SOL IRR WATER STRL 3000 ML

## (undated) DEVICE — PACK SET UP CONVERTORS

## (undated) DEVICE — SEE MEDLINE ITEM 157181

## (undated) DEVICE — RELOAD ECHELON FLEX WHT 60MM

## (undated) DEVICE — TROCAR KII FIOS 11MM X 100MM